# Patient Record
Sex: FEMALE | Race: WHITE | NOT HISPANIC OR LATINO | Employment: FULL TIME | URBAN - METROPOLITAN AREA
[De-identification: names, ages, dates, MRNs, and addresses within clinical notes are randomized per-mention and may not be internally consistent; named-entity substitution may affect disease eponyms.]

---

## 2017-01-18 ENCOUNTER — TELEPHONE (OUTPATIENT)
Dept: OBSTETRICS AND GYNECOLOGY | Facility: CLINIC | Age: 27
End: 2017-01-18

## 2017-01-18 NOTE — TELEPHONE ENCOUNTER
Spoke w/ pt. & is currently 27wks pregnant. I rescheduled her appt. With us to 1-25 which is sooner.

## 2017-01-18 NOTE — TELEPHONE ENCOUNTER
LMOR for pt. To return call.  Cannot give pt. A note for work since pt. Has not been seen by  yet.   Also wanted to ask pt. If she would rather move her appt. Earlier to the 25th in [place] rather than the 31st?  Also need to clarify how many wks pt. Is currently.

## 2017-01-18 NOTE — TELEPHONE ENCOUNTER
Dr. Shields new ob pt, 7 months ob, had appt schedule for yesterday but had to reschedule for the 31st per Dr. Shields. 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 Please call pt at [phone]

## 2017-01-25 ENCOUNTER — OFFICE VISIT (OUTPATIENT)
Dept: OBSTETRICS AND GYNECOLOGY | Facility: CLINIC | Age: 27
End: 2017-01-25
Attending: OBSTETRICS & GYNECOLOGY
Payer: COMMERCIAL

## 2017-01-25 VITALS
WEIGHT: 207.13 LBS | DIASTOLIC BLOOD PRESSURE: 62 MMHG | BODY MASS INDEX: 32.51 KG/M2 | SYSTOLIC BLOOD PRESSURE: 108 MMHG | HEIGHT: 67 IN

## 2017-01-25 DIAGNOSIS — Z34.02 ENCOUNTER FOR SUPERVISION OF NORMAL FIRST PREGNANCY IN SECOND TRIMESTER: ICD-10-CM

## 2017-01-25 DIAGNOSIS — Z3A.27 27 WEEKS GESTATION OF PREGNANCY: Primary | ICD-10-CM

## 2017-01-25 DIAGNOSIS — A60.00 HERPES SIMPLEX INFECTION OF GENITOURINARY SYSTEM: ICD-10-CM

## 2017-01-25 PROCEDURE — 0500F INITIAL PRENATAL CARE VISIT: CPT | Mod: S$GLB,,, | Performed by: OBSTETRICS & GYNECOLOGY

## 2017-01-25 PROCEDURE — 99999 PR PBB SHADOW E&M-EST. PATIENT-LVL III: CPT | Mod: PBBFAC,,, | Performed by: OBSTETRICS & GYNECOLOGY

## 2017-01-25 NOTE — MR AVS SNAPSHOT
[facility]  [address]  [place] [address]  Phone: [phone]  Fax: [phone]                  Leydi Matias   2017 9:15 AM   Office Visit    Description:  Female : 1990   Provider:  Robert Shields MD   Department:  [facility]           Reason for Visit     Routine Prenatal Visit           Diagnoses this Visit        Comments    27 weeks gestation of pregnancy    -  Primary     Encounter for supervision of normal first pregnancy in second trimester                To Do List           Future Appointments        Provider Department Dept Phone    2017 1:00 PM Robert Shields MD [facility] [phone]    2017 1:30 PM Robert Shields MD [facility] [phone]      Goals (5 Years of Data)     None      [facility]     [facility] -  Assistance  Registered nurses in the [facility] On Call Center provide clinical advisement, health education, appointment booking, and other advisory services.  Call for this free service at [phone].             Medications           Message regarding Medications     Verify the changes and/or additions to your medication regime listed below are the same as discussed with your clinician today.  If any of these changes or additions are incorrect, please notify your healthcare provider.        STOP taking these medications     azithromycin (ZITHROMAX) 1 gram Pack Take 1 g by mouth once.    ethynodiol-ethinyl estradiol (KELNOR) 1-35 mg-mcg per tablet TAKE 4 PILLS FIRST DAY, 3 PILLS THE SECOND DAY, 2 PILLS THIRD DAY, THEN PILL QD AND SKIP SUGAR PILLS    tinidazole (TINDAMAX) 500 MG tablet 4 PILLS A DAY FOR 2 DAYS           Verify that the below list of medications is an accurate representation of the medications you are currently taking.  If none reported, the list may be blank. If incorrect, please contact your healthcare provider. Carry this list with you in  "case of emergency.           Current Medications     PRENATAL VIT/IRON FUMARATE/FA (PRENATAL 1+1 ORAL) Take by mouth.    drospirenone-ethinyl estradiol (SHERRIE) 3-0.02 mg per tablet Take 1 tablet by mouth once daily.           Clinical Reference Information           Prenatal Vitals     Enc. Date GA Prenatal Vitals Prenatal Pulse Pain Level Urine Albumin/Glucose Edema Presentation Dilation/Effacement/Station    1/25/17  108/62 / 93.9 kg (207 lb 2 oz)              Height: 5' 7" (1.702 m)       Vital Signs - Last Recorded  Most recent update: 1/25/2017  9:24 AM by Lis Calhoun MA    BP Ht Wt LMP BMI    108/62 5' 7" (1.702 m) 93.9 kg (207 lb 2 oz) 07/04/2016 32.44 kg/m2      Blood Pressure          Most Recent Value    BP  108/62      Allergies as of 1/25/2017     No Known Allergies      Immunizations Administered on Date of Encounter - 1/25/2017     None      Orders Placed During Today's Visit     Future Labs/Procedures Expected by Expires    CBC auto differential  1/25/2017 3/26/2018    OB Glucose Screen  1/25/2017 3/26/2018      [facility] Sign-Up     Activating your [facility] account is as easy as 1-2-3!     1) Visit [email or web address], select Sign Up Now, enter this activation code and your date of birth, then select Next.  [email or web address]  Expires: 3/11/2017  8:58 AM      2) Create a username and password to use when you visit [facility] in the future and select a security question in case you lose your password and select Next.    3) Enter your e-mail address and click Sign Up!    Additional Information  If you have questions, please e-mail [email or web address] or call [phone] to talk to our [facility] staff. Remember, [facility] is NOT to be used for urgent needs. For medical emergencies, dial 911.         "

## 2017-01-29 PROBLEM — A60.00 HERPES SIMPLEX INFECTION OF GENITOURINARY SYSTEM: Status: ACTIVE | Noted: 2017-01-29

## 2017-01-30 NOTE — PROGRESS NOTES
Subjective:       Patient ID: Leydi Matias is a 27 y.o. female.    Chief Complaint:  Routine Prenatal Visit (pap:2016)      History of Present Illness  27-year-old she 0 presents with a last menstrual period of 16.  Recently moved from [place] and currently is 28 and one sevenths weeks pregnant.  EDC is 2017  Patient with an uncomplicated pregnancy.  Has had 2 ultrasounds thus far this pregnancy including one normal anatomy scan.  Patient reports the gender is a girl.  This pregnancy's only complication has been a diagnosis of HSV.  She is currently not on suppressive therapy and declines suppressive therapy until 35 weeks.      GYN & OB History  Patient's last menstrual period was 2016.   Date of Last Pap: 2016 normal  Had flu shot 2016    OB History    Para Term  AB SAB TAB Ectopic Multiple Living   1 0 0 0 0 0 0 0 0 0      # Outcome Date GA Lbr Rocco/2nd Weight Sex Delivery Anes PTL Lv   1 Current                   Review of Systems  Review of Systems   Constitutional: Negative for fatigue and unexpected weight change.   Respiratory: Negative for shortness of breath.    Cardiovascular: Negative for chest pain.   Gastrointestinal: Negative for abdominal pain, constipation, diarrhea, nausea and vomiting.   Genitourinary: Negative for dysuria.   Musculoskeletal: Negative for back pain.   Skin: Negative for rash.   Neurological: Negative for headaches.   Hematological: Does not bruise/bleed easily.   Psychiatric/Behavioral: Negative for behavioral problems.        Objective:   Physical Exam:   Constitutional: She is oriented to person, place, and time. She appears well-developed and well-nourished.    HENT:   Head: Normocephalic and atraumatic.            Abdominal: Soft. There is no tenderness.   Fundal height 28  Fetal heart tones present             Musculoskeletal: Normal range of motion.       Neurological: She is alert and oriented to person, place, and time.           Assessment/ Plan:     Orders Placed This Encounter    OB Glucose Screen    CBC auto differential       Leydi was seen today for routine prenatal visit.    Diagnoses and all orders for this visit:    27 weeks gestation of pregnancy  -     OB Glucose Screen; Future  -     CBC auto differential; Future    Encounter for supervision of normal first pregnancy in second trimester  -     OB Glucose Screen; Future  -     CBC auto differential; Future        Return in about 2 weeks (around 2/8/2017).      Health Maintenance       Date Due Completion Date    Lipid Panel 1990 ---    TETANUS VACCINE 1/26/2006 ---    Pap Smear 1/26/2009 ---    Influenza Vaccine 8/1/2016 ---

## 2017-02-02 ENCOUNTER — LAB VISIT (OUTPATIENT)
Dept: LAB | Facility: HOSPITAL | Age: 27
End: 2017-02-02
Attending: OBSTETRICS & GYNECOLOGY
Payer: COMMERCIAL

## 2017-02-02 DIAGNOSIS — Z3A.27 27 WEEKS GESTATION OF PREGNANCY: ICD-10-CM

## 2017-02-02 DIAGNOSIS — Z34.02 ENCOUNTER FOR SUPERVISION OF NORMAL FIRST PREGNANCY IN SECOND TRIMESTER: ICD-10-CM

## 2017-02-02 LAB
BASOPHILS # BLD AUTO: 0.01 K/UL
BASOPHILS NFR BLD: 0.2 %
DIFFERENTIAL METHOD: ABNORMAL
EOSINOPHIL # BLD AUTO: 0.2 K/UL
EOSINOPHIL NFR BLD: 3.1 %
ERYTHROCYTE [DISTWIDTH] IN BLOOD BY AUTOMATED COUNT: 15 %
GLUCOSE SERPL-MCNC: 106 MG/DL
HCT VFR BLD AUTO: 28.4 %
HGB BLD-MCNC: 9.3 G/DL
LYMPHOCYTES # BLD AUTO: 1.3 K/UL
LYMPHOCYTES NFR BLD: 20.4 %
MCH RBC QN AUTO: 26.1 PG
MCHC RBC AUTO-ENTMCNC: 32.7 %
MCV RBC AUTO: 80 FL
MONOCYTES # BLD AUTO: 0.3 K/UL
MONOCYTES NFR BLD: 4.5 %
NEUTROPHILS # BLD AUTO: 4.6 K/UL
NEUTROPHILS NFR BLD: 71.2 %
PLATELET # BLD AUTO: 153 K/UL
PMV BLD AUTO: 10.5 FL
RBC # BLD AUTO: 3.57 M/UL
WBC # BLD AUTO: 6.51 K/UL

## 2017-02-02 PROCEDURE — 82950 GLUCOSE TEST: CPT

## 2017-02-02 PROCEDURE — 85025 COMPLETE CBC W/AUTO DIFF WBC: CPT

## 2017-02-03 NOTE — PROGRESS NOTES
Please call and tell result below...    Hi! Your glucose tolerance was normal which means you do NOT have gestational diabetes.   However you are slightly anemic due to pregnancy.  This is very common at the end of pregnancy.   I recommend that you start an over-the-counter iron once a day in addition to prenatal vitamin.  Hopefully with the extra iron, you'll have more energy and will be less tired.  Take care,  Dr. Shields

## 2017-02-06 ENCOUNTER — TELEPHONE (OUTPATIENT)
Dept: OBSTETRICS AND GYNECOLOGY | Facility: CLINIC | Age: 27
End: 2017-02-06

## 2017-02-06 NOTE — TELEPHONE ENCOUNTER
Left message stating that per Dr. Shields her glucose test came back normal but that her labs did show she was slightly anemic. Advised to start an over the counter iron supplement in addition to her prenatal vitamin.     ----- Message from oRbert Shields MD sent at 2/3/2017  4:52 PM CST -----  Please call and tell result below...    Hi! Your glucose tolerance was normal which means you do NOT have gestational diabetes.   However you are slightly anemic due to pregnancy.  This is very common at the end of pregnancy.   I recommend that you start an over-the-counter iron once a day in addition to prenatal vitamin.  Hopefully with the extra iron, you'll have more energy and will be less tired.  Take care,  Dr. Shields

## 2017-02-09 ENCOUNTER — ROUTINE PRENATAL (OUTPATIENT)
Dept: OBSTETRICS AND GYNECOLOGY | Facility: CLINIC | Age: 27
End: 2017-02-09
Payer: COMMERCIAL

## 2017-02-09 VITALS
BODY MASS INDEX: 33.08 KG/M2 | DIASTOLIC BLOOD PRESSURE: 60 MMHG | SYSTOLIC BLOOD PRESSURE: 112 MMHG | WEIGHT: 211.19 LBS

## 2017-02-09 DIAGNOSIS — Z3A.30 30 WEEKS GESTATION OF PREGNANCY: Primary | ICD-10-CM

## 2017-02-09 PROCEDURE — 0502F SUBSEQUENT PRENATAL CARE: CPT | Mod: S$GLB,,, | Performed by: OBSTETRICS & GYNECOLOGY

## 2017-02-09 PROCEDURE — 99999 PR PBB SHADOW E&M-EST. PATIENT-LVL II: CPT | Mod: PBBFAC,,, | Performed by: OBSTETRICS & GYNECOLOGY

## 2017-02-09 NOTE — PROGRESS NOTES
Patient without complaints.  Glucose tolerance test done and was normal.  Patient overall feeling well.  Follow-up in 2 weeks with nurse practitioner and then in 4 weeks with bone

## 2017-02-09 NOTE — MR AVS SNAPSHOT
[facility]  [address]  [place] [address]  Phone: [phone]  Fax: [phone]                  Leydi Matias   2017 1:00 PM   Routine Prenatal    Description:  Female : 1990   Provider:  Robert Shields MD   Department:  [facility]           Reason for Visit     Routine Prenatal Visit           Diagnoses this Visit        Comments    30 weeks gestation of pregnancy    -  Primary            To Do List           Future Appointments        Provider Department Dept Phone    2017 1:30 PM Robert Shields MD [facility] [phone]    3/9/2017 1:30 PM Robert Shields MD [facility] [phone]    3/23/2017 1:30 PM Robert Shields MD [facility] [phone]    3/30/2017 1:30 PM Robert Shields MD [facility] [phone]    2017 1:30 PM Robert Shields MD [facility] [phone]      Goals (5 Years of Data)     None      Follow-Up and Disposition     Return in about 2 weeks (around 2017).    Follow-up and Disposition History      [facility]     [facility] On Call Nurse Care Line -  Assistance  Registered nurses in the [facility] On Call Center provide clinical advisement, health education, appointment booking, and other advisory services.  Call for this free service at [phone].             Medications           Message regarding Medications     Verify the changes and/or additions to your medication regime listed below are the same as discussed with your clinician today.  If any of these changes or additions are incorrect, please notify your healthcare provider.             Verify that the below list of medications is an accurate representation of the medications you are currently taking.  If none reported, the list may be blank. If incorrect, please contact your healthcare provider. Carry this list with you in case of emergency.           Current Medications      "drospirenone-ethinyl estradiol (SHERRIE) 3-0.02 mg per tablet Take 1 tablet by mouth once daily.    PRENATAL VIT/IRON FUMARATE/FA (PRENATAL 1+1 ORAL) Take by mouth.           Clinical Reference Information           Prenatal Vitals     Enc. Date GA Prenatal Vitals Prenatal Pulse Pain Level Urine Albumin/Glucose Edema Presentation Dilation/Effacement/Station    2/9/17 30w2d 112/60 / 95.8 kg (211 lb 3.2 oz) 31 cm / An 33 / Present  0 Negative / Negative None / None / None / No      1/25/17 28w1d 108/62 / 93.9 kg (207 lb 2 oz)              Height: 5' 7" (1.702 m)       Your Vitals Were     BP Weight Last Period BMI       112/60 95.8 kg (211 lb 3.2 oz) 07/12/2016 (Exact Date) 33.08 kg/m2       Allergies as of 2/9/2017     No Known Allergies      Immunizations Administered on Date of Encounter - 2/9/2017     None      [facility] Sign-Up     Activating your [facility] account is as easy as 1-2-3!     1) Visit [email or web address], select Sign Up Now, enter this activation code and your date of birth, then select Next.  [email or web address]  Expires: 3/11/2017  8:58 AM      2) Create a username and password to use when you visit [facility] in the future and select a security question in case you lose your password and select Next.    3) Enter your e-mail address and click Sign Up!    Additional Information  If you have questions, please e-mail [email or web address] or call [phone] to talk to our [facility] staff. Remember, [facility] is NOT to be used for urgent needs. For medical emergencies, dial 911.         Language Assistance Services     ATTENTION: Language assistance services are available, free of charge. Please call [phone].      ATENCIÓN: Si habla español, tiene a paige disposición servicios gratuitos de asistencia lingüística. Llame al [phone].     CHÚ Ý: N?u b?n nói Ti?ng Vi?t, có các d?ch v? h? tr? ngôn ng? mi?n phí dành cho b?n. G?i s? [phone].         [facility] complies with applicable " Federal civil rights laws and does not discriminate on the basis of race, color, national origin, age, disability, or sex.

## 2017-02-23 ENCOUNTER — ROUTINE PRENATAL (OUTPATIENT)
Dept: OBSTETRICS AND GYNECOLOGY | Facility: CLINIC | Age: 27
End: 2017-02-23
Payer: COMMERCIAL

## 2017-02-23 VITALS
SYSTOLIC BLOOD PRESSURE: 126 MMHG | BODY MASS INDEX: 33.15 KG/M2 | DIASTOLIC BLOOD PRESSURE: 76 MMHG | WEIGHT: 211.63 LBS

## 2017-02-23 DIAGNOSIS — Z3A.32 32 WEEKS GESTATION OF PREGNANCY: Primary | ICD-10-CM

## 2017-02-23 PROCEDURE — 99999 PR PBB SHADOW E&M-EST. PATIENT-LVL I: CPT | Mod: PBBFAC,,, | Performed by: OBSTETRICS & GYNECOLOGY

## 2017-02-23 PROCEDURE — 0502F SUBSEQUENT PRENATAL CARE: CPT | Mod: S$GLB,,, | Performed by: OBSTETRICS & GYNECOLOGY

## 2017-02-23 NOTE — MR AVS SNAPSHOT
[facility]  [address]  [place] [address]  Phone: [phone]  Fax: [phone]                  Leydi Matias   2017 1:30 PM   Routine Prenatal    Description:  Female : 1990   Provider:  Robert Shields MD   Department:  [facility]           Reason for Visit     Routine Prenatal Visit           Diagnoses this Visit        Comments    32 weeks gestation of pregnancy    -  Primary            To Do List           Future Appointments        Provider Department Dept Phone    3/9/2017 1:30 PM Robert Shields MD [facility] [phone]    3/9/2017 2:45 PM Lang Moser, OD [place] [facility] [phone]    3/9/2017 3:15 PM Lang Moser, OD [place] [facility] [phone]    3/23/2017 1:30 PM Robert Shields MD [facility] [phone]    3/30/2017 1:30 PM Robert Shields MD [facility] [phone]      Goals (5 Years of Data)     None      Follow-Up and Disposition     Return in about 2 weeks (around 3/9/2017).    Follow-up and Disposition History      [facility]     [facility] On Call Nurse Care Line -  Assistance  Registered nurses in the [facility] On Call Center provide clinical advisement, health education, appointment booking, and other advisory services.  Call for this free service at [phone].             Medications           Message regarding Medications     Verify the changes and/or additions to your medication regime listed below are the same as discussed with your clinician today.  If any of these changes or additions are incorrect, please notify your healthcare provider.             Verify that the below list of medications is an accurate representation of the medications you are currently taking.  If none reported, the list may be blank. If incorrect, please contact your healthcare provider. Carry this list with you in case of emergency.           Current Medications      "drospirenone-ethinyl estradiol (SHERRIE) 3-0.02 mg per tablet Take 1 tablet by mouth once daily.    PRENATAL VIT/IRON FUMARATE/FA (PRENATAL 1+1 ORAL) Take by mouth.           Clinical Reference Information           Prenatal Vitals     Enc. Date GA Prenatal Vitals Prenatal Pulse Pain Level Urine Albumin/Glucose Edema Presentation Dilation/Effacement/Station    2/23/17 32w2d 126/76 / 96 kg (211 lb 10.3 oz) 32 cm / 144 / Present  0 Negative / Negative +1 / +1 / None / No      2/9/17 30w2d 112/60 / 95.8 kg (211 lb 3.2 oz) 31 cm / An 33 / Present  0 Negative / Negative None / None / None / No      1/25/17 28w1d 108/62 / 93.9 kg (207 lb 2 oz)              Number of fetuses: 1   Height: 5' 7" (1.702 m)       Your Vitals Were     BP Weight Last Period BMI       126/76 96 kg (211 lb 10.3 oz) 07/12/2016 (Exact Date) 33.15 kg/m2       Allergies as of 2/23/2017     No Known Allergies      Immunizations Administered on Date of Encounter - 2/23/2017     None      Language Assistance Services     ATTENTION: Language assistance services are available, free of charge. Please call [phone].      ATENCIÓN: Si russell smith, tiene a paige disposición servicios gratuitos de asistencia lingüística. Llame al [phone].     [facility] Ý: N?u b?n nói Ti?ng Vi?t, có các d?ch v? h? tr? ngôn ng? mi?n phí dành cho b?n. G?i s? [phone].         [facility] complies with applicable Federal civil rights laws and does not discriminate on the basis of race, color, national origin, age, disability, or sex.        "

## 2017-02-23 NOTE — PROGRESS NOTES
Patient without complaints.  Doing well.  Active fetal movement.  Has a right hip pain when she lays on her side at night.  Otherwise no contractions and doing well.  Follow-up in 2 weeks.  Discussed suppressive Valtrex after next visit.

## 2017-02-27 ENCOUNTER — NURSE TRIAGE (OUTPATIENT)
Dept: ADMINISTRATIVE | Facility: CLINIC | Age: 27
End: 2017-02-27

## 2017-02-28 NOTE — TELEPHONE ENCOUNTER
Reason for Disposition   Round ligament pain (previously diagnosed by physician), questions about    Protocols used: ST PREGNANCY - ABDOMINAL PAIN GREATER THAN 20 WEEKS EGA-A-    Pt calling with complaints of pain rates 3/10 on pain scale (pain earlier 7/10 but better now).  Care Advice given.

## 2017-03-06 ENCOUNTER — TELEPHONE (OUTPATIENT)
Dept: OBSTETRICS AND GYNECOLOGY | Facility: CLINIC | Age: 27
End: 2017-03-06

## 2017-03-06 NOTE — TELEPHONE ENCOUNTER
33 6/7 week ob  States she hasn't been feeling well lately.  She has an appt Thursday but wants to be seen sooner.  She has been working a lot lately and the AC in her house isnt working so it has been hot.  C/o Had a tightness and pain in her lower abdomen during Mardi Gras so she called after hours and was told it was probably round ligament.  She started getting lightheaded and nauseated yesterday so she is concerned and would like to be seen sooner. Recommended she increase PO hydration and rescheduled her appt to tomorrow with Yohana per pt's request.  Offered her an appt with Radha tomorrow at [facility] [place] since Dr. Shields will be there too, declined.

## 2017-03-06 NOTE — TELEPHONE ENCOUNTER
Dr Shields pt calling, Ob 34wks not feeling good. During Ray[place] Gras her belly was tight and cramping. Pt also states that she has to lift and push heavy things at work and she just wants to make sure that everything is ok.Pt # [phone]

## 2017-03-07 ENCOUNTER — ROUTINE PRENATAL (OUTPATIENT)
Dept: OBSTETRICS AND GYNECOLOGY | Facility: CLINIC | Age: 27
End: 2017-03-07
Payer: COMMERCIAL

## 2017-03-07 ENCOUNTER — TELEPHONE (OUTPATIENT)
Dept: OBSTETRICS AND GYNECOLOGY | Facility: CLINIC | Age: 27
End: 2017-03-07

## 2017-03-07 ENCOUNTER — CLINICAL SUPPORT (OUTPATIENT)
Dept: OBSTETRICS AND GYNECOLOGY | Facility: CLINIC | Age: 27
End: 2017-03-07
Payer: COMMERCIAL

## 2017-03-07 VITALS
SYSTOLIC BLOOD PRESSURE: 106 MMHG | WEIGHT: 214.81 LBS | BODY MASS INDEX: 33.65 KG/M2 | DIASTOLIC BLOOD PRESSURE: 78 MMHG

## 2017-03-07 DIAGNOSIS — Z34.00 GRAVIDA 1, CURRENTLY PREGNANT: ICD-10-CM

## 2017-03-07 DIAGNOSIS — Z23 NEED FOR TDAP VACCINATION: Primary | ICD-10-CM

## 2017-03-07 DIAGNOSIS — Z3A.34 34 WEEKS GESTATION OF PREGNANCY: Primary | ICD-10-CM

## 2017-03-07 PROCEDURE — 99999 PR PBB SHADOW E&M-EST. PATIENT-LVL II: CPT | Mod: PBBFAC,,, | Performed by: NURSE PRACTITIONER

## 2017-03-07 PROCEDURE — 90715 TDAP VACCINE 7 YRS/> IM: CPT | Mod: S$GLB,,, | Performed by: NURSE PRACTITIONER

## 2017-03-07 PROCEDURE — 0502F SUBSEQUENT PRENATAL CARE: CPT | Mod: S$GLB,,, | Performed by: NURSE PRACTITIONER

## 2017-03-07 PROCEDURE — 90471 IMMUNIZATION ADMIN: CPT | Mod: S$GLB,,, | Performed by: NURSE PRACTITIONER

## 2017-03-07 NOTE — TELEPHONE ENCOUNTER
"Pt mother, Natalie Watkins, phoned back as promised. Natalie informed that clarification was explained about our phone system. Our office doesn't have a "[employer]" phone number and our old private practice phone number was retained, therefore sometimes our office number is trickier to find. When calling our office number [phone] during business hours there will be someone to answer the phones, after hours and holidays the OB triage line is reached where you are routed to either a OB triage RN or a GYN triage RN based on your selection. Another way to contact your MD is your daughter could send a "my Portal" message. Feb 27th was not a holiday and the office was open so if patient or patient mother was calling the correct number then a voicemail box should not be an option. An apology was extended to patient's mother, Natalie accepted apology. Dr Shields's MA, Heidi Nagel, informed of situation.  "

## 2017-03-07 NOTE — TELEPHONE ENCOUNTER
"Pt's mother called, her name is Natalie Watkins she said she was very nervous about not being able to get in touch with someone on Monday Feb 27th (the day before mardi gras). She stated that her daughter is due around [facility] and she doesn't want this to happen again "because it is a holiday". She reported that she called the office and was prompted and transferred but was unable to leave a voicemail because the "box was full" she said she then tried to call the [facility] line and could not get in touch with anyone. Finally she tried the [facility] on-call line and spoke to a triage RN, who was able to provide her daughter with advice for her concern. Natalie expressed that she is a RN at [facility] and that she was extremely frustrated that she could not get in touch with someone. Pt mother phone number taken and informed that after speaking with Phone Coordinator a return call would be made to her. Pt Mother provided phone number [phone]  "

## 2017-03-07 NOTE — TELEPHONE ENCOUNTER
Pt's mom Natalie called to speak with . She was very upset because her daughter was in pain on 2/27,the day before aleisha cooper and said she called 3 different numbers and could not get in touch with anyone. She said finally she got in touch with the on call staff but did not hear from our office to check on pt or see how she was. Pt called 3/6 because she did not feel well and spoke with Ale, was offered appts and came in to be seen today,3/7. Transferred call to [place].

## 2017-03-07 NOTE — PATIENT INSTRUCTIONS
Topic  General Pregnancy Information Recommended   (Unless Otherwise Contraindicated Or Restrictions Given To You By Your OB Doctor)      1. Anticipated course of prenatal care       Visits: will be Every 4 wks until 28 weeks, then every 2 weeks until 36 weeks, and then weekly until delivery.    Urine will be collected at each Obstetric visit        2. Nutrition and weight gain     Daily pre-hazel vitamin (recommend taking at night)    Additional 300 calories needed daily   No Sushi, hotdogs, unpasteurized products (milk/cheeses). No large fish such as: shark, zaid mackerel, tile, sword fish    Incorporate 12 ounces of smaller seafood/week and no more than 6oz of albacore tuna    Caffeine: 200 mg/day or 2 cups of caffeine/day    Weight gain recommendations are based off of BMI before pregnancy. Generally patients who with normal weight prior pregnancy it is recommended 25-35 pounds of weight gain during the pregnancy with an estimated weekly gain of 1 pound/wk in 2nd and 3rd Trimester.    3. Toxoplasmosis precautions   If cats are in the home avoid changing litter boxes and if you need to change the litter box recommended you use gloves   4. Sexual Activity   Sexual activity is okay unless you are put on restrictions by your provider. I recommend urinating after intercourse    5. Exercise   Generally pre-pregnancy routine is okay to continue    Drink plenty fluids for hydration    Stop any activity that causes heavy cramping like a period or bright red bleeding and contact your provider   No extreme or contact sports    No exercise on your back for an extended period of time after 20 weeks    6. Hot Tub/Saunas   Avoid hot tubes and saunas    7. Hair Treatments   Because of the lack of scientific studies on the effects of chemical treatments on your hair, we must advise that you do it at your own risk. If you choose to treat your hair, we recommend that you wait until after 12 weeks gestation. At  this time there is no reason to believe that normal hair treatment is associated with onsequences to the baby.    8. Vaccines   Influenza vaccine is recommended by CDC during flu season    Tdap (pertussis or whopping cough) recommended each pregnancy between 27 and 36 weeks    Tdap booster recommended for family and other planned direct caregivers    9. Water   Water is an important nutrient in a good diet. However, it cannot be stressed enough that during pregnancy water is essential. The body has increased circulation through blood vessels, and without a large increase in water, pregnant women will be dehydrated. It plays an important role in decreasing constipation, preventing  contractions, decreasing swelling, and preventing dizziness. We recommend that you drink 8-10 glasses of water per day.    10. Smoking/Alcohol/Illicit Drug Use   No safe Level    Can lead to problems with pregnancy    Growth of the developing fetus     labor (delivery before 37 weeks)     rupture of the membranes (water breaking before 37 weeks)    Premature separation of the placenta (which may cause bleeding)    American College of Obstetricians and Gynecologists endorses abstinence    Can lead to babies with disabilities    11. Environmental or work hazards   Unless otherwise restricted you may continue work throughout the pregnancy    Notify your provider of any work hazards or chemical exposure concerns   12. Travel     Safe to travel up to 35 weeks    Continue to wear a seatbelt and airbags are still recommended    Drink plenty fluids    Blood clots are a concern during pregnancy with long travel. Recommend compression stockings and moving around at least every 2 hours and staying hydrated.    13. Use of medications, vitamins, herbs, OTC drugs     Any medications not on the list provided to you from our clinic or given to you by one of our providers we recommend calling to make sure the  medication is safe for you and baby.    14. Domestic Violence     Please notify office immediately of any concerns or violence so that we can help direct you to assistance needed    [place] [employer]: [phone]    15. Childbirth classes     List of Childbirth classes from [facility] is available    16. Selecting a Pediatrician   Selecting a pediatrician before delivery is recommended   You can interview pediatricians before delivery    17. Fetal Monitoring     A simple test of your babys well-being is a kick count. After 26 weeks, fetal motion of any kind should be monitored. Further discussion at that time   18.  Labor Signs     Water break, leaking fluids from Vagina prior 37 weeks   Regular contractions, Contractions that are more than 5-6/hour, getting stronger and painful with lower back pain, does not go away with rest and fluids    19. Postpartum Family Planning     Multiple options available from short term methods to long term reversible and irreversible methods    Discuss with provider as you get closer to delivery    20. Dental     It is recommended that you get an annual dental cleaning    21. Breastfeeding     Classes offered at [facility] and it is recommended to take a class    22. Lifting  In 2013, the National [place] for Occupational Safety and Health (NIOSH) published clinical guidelines for occupational lifting in uncomplicated pregnancies. The recommended weight limits are based on gestational age, intermittent versus repetitive lifting, time (hours/day) spent lifting, and lifting height from floor and distance in 3 front of body. In this guideline, the maximum permissible weight for a woman less than 20 weeks of gestation performing infrequent lifting is 36 pounds (16 kgs) and the maximum permissible weight at ?20 weeks is 26 pounds (12 kgs). For repetitive lifting ?1 hour/day, the maximum weights in the first and second half of pregnancy are  18 pounds (8 kgs) and 13 pounds (6 kgs), respectively, and for repetitive lifting <1 hour/day, the maximum weights are 30 pounds (14 kgs) and 22 pounds (10 kgs), respectively. Although not based on high quality evidence, these guidelines are a reasonable reference for counseling pregnant women     23. Scheduling and Provider Availability     Your Obstetric Doctor is usually here weekly but not every day. We recommend you make 3-4 advanced appointments at a time to accommodate your personal needs and work/school obligations.    We ask that you come 15 minutes prior your scheduled appointment.    For same day appointments (not routine appointments) there is a Nurse Practitioner or another obstetric provider available. Please let the  aware you are an OB patient requesting a same day appointment.      24. Recommended Phone Colt     Molecular Detection    [facility]

## 2017-03-07 NOTE — PROGRESS NOTES
Ordering Physician: Yohana George NP    Order Type: Verbal     During visit today patient received injection of tdap to left deltoid. Patient tolerated well, no allergic reaction noted. Requested patient to remain 10 minutes after injection.     Pre-Pain Scale:None     Post Pain Scale:None

## 2017-03-07 NOTE — MR AVS SNAPSHOT
[facility]  [address]  [place] [address]  Phone: [phone]  Fax: [phone]                  Leydi Matias   3/7/2017 1:40 PM   Routine Prenatal    Description:  Female : 1990   Provider:  Yohana George NP   Department:  [facility]           Reason for Visit     Routine Prenatal Visit           Diagnoses this Visit        Comments    34 weeks gestation of pregnancy    -  Primary      1, currently pregnant                To Do List           Future Appointments        Provider Department Dept Phone    3/9/2017 2:45 PM Lang Moser, OD [facility] [phone]    3/9/2017 3:15 PM Lang Moser, OD [facility] [phone]    3/23/2017 1:30 PM Robert Shields MD [facility] [phone]    3/30/2017 1:30 PM Robert Shields MD [facility] [phone]    2017 1:30 PM Robert Shields MD [facility] [phone]      Goals (5 Years of Data)     None      Follow-Up and Disposition     Return for Routine OB.    Follow-up and Disposition History      [facility]     [facility] On [facility] Nurse Care Line -  Assistance  Registered nurses in the [facility] provide clinical advisement, health education, appointment booking, and other advisory services.  Call for this free service at [phone].             Medications           Message regarding Medications     Verify the changes and/or additions to your medication regime listed below are the same as discussed with your clinician today.  If any of these changes or additions are incorrect, please notify your healthcare provider.             Verify that the below list of medications is an accurate representation of the medications you are currently taking.  If none reported, the list may be blank. If incorrect, please contact your healthcare provider. Carry this list with you in case of emergency.           Current Medications  "    PRENATAL VIT/IRON FUMARATE/FA (PRENATAL 1+1 ORAL) Take by mouth.    drospirenone-ethinyl estradiol (SHERRIE) 3-0.02 mg per tablet Take 1 tablet by mouth once daily.           Clinical Reference Information           Prenatal Vitals     Enc. Date GA Prenatal Vitals Prenatal Pulse Pain Level Urine Albumin/Glucose Edema Presentation Dilation/Effacement/Station    3/7/17 34w0d 106/78 / 97.4 kg (214 lb 13.4 oz)  /  / Present  4 Negative / Negative +1 / +1 / None / No      17 32w2d 126/76 / 96 kg (211 lb 10.3 oz) 32 cm / 144 / Present  0 Negative / Negative +1 / +1 / None / No      17 30w2d 112/60 / 95.8 kg (211 lb 3.2 oz) 31 cm / An 33 / Present  0 Negative / Negative None / None / None / No      17 28w1d 108/62 / 93.9 kg (207 lb 2 oz)              Number of fetuses: 1   Height: 5' 7" (1.702 m)       Your Vitals Were     BP Weight Last Period BMI       106/78 97.4 kg (214 lb 13.4 oz) 2016 (Exact Date) 33.65 kg/m2       Allergies as of 3/7/2017     No Known Allergies      Immunizations Administered on Date of Encounter - 3/7/2017     Name Date Dose VIS Date Route    TDAP 3/7/2017 0.5 mL 2015 Intramuscular      Instructions    Topic  General Pregnancy Information Recommended   (Unless Otherwise Contraindicated Or Restrictions Given To You By Your OB Doctor)      1. Anticipated course of prenatal care       Visits: will be Every 4 wks until 28 weeks, then every 2 weeks until 36 weeks, and then weekly until delivery.    Urine will be collected at each Obstetric visit        2. Nutrition and weight gain     Daily pre-hazel vitamin (recommend taking at night)    Additional 300 calories needed daily   No Sushi, hotdogs, unpasteurized products (milk/cheeses). No large fish such as: shark, zaid mackerel, tile, sword fish    Incorporate 12 ounces of smaller seafood/week and no more than 6oz of albacore tuna    Caffeine: 200 mg/day or 2 cups of caffeine/day    Weight gain recommendations are based " off of BMI before pregnancy. Generally patients who with normal weight prior pregnancy it is recommended 25-35 pounds of weight gain during the pregnancy with an estimated weekly gain of 1 pound/wk in 2nd and 3rd Trimester.    3. Toxoplasmosis precautions   If cats are in the home avoid changing litter boxes and if you need to change the litter box recommended you use gloves   4. Sexual Activity   Sexual activity is okay unless you are put on restrictions by your provider. I recommend urinating after intercourse    5. Exercise   Generally pre-pregnancy routine is okay to continue    Drink plenty fluids for hydration    Stop any activity that causes heavy cramping like a period or bright red bleeding and contact your provider   No extreme or contact sports    No exercise on your back for an extended period of time after 20 weeks    6. Hot Tub/Saunas   Avoid hot tubes and saunas    7. Hair Treatments   Because of the lack of scientific studies on the effects of chemical treatments on your hair, we must advise that you do it at your own risk. If you choose to treat your hair, we recommend that you wait until after 12 weeks gestation. At this time there is no reason to believe that normal hair treatment is associated with onsequences to the baby.    8. Vaccines   Influenza vaccine is recommended by CDC during flu season    Tdap (pertussis or whopping cough) recommended each pregnancy between 27 and 36 weeks    Tdap booster recommended for family and other planned direct caregivers    9. Water   Water is an important nutrient in a good diet. However, it cannot be stressed enough that during pregnancy water is essential. The body has increased circulation through blood vessels, and without a large increase in water, pregnant women will be dehydrated. It plays an important role in decreasing constipation, preventing  contractions, decreasing swelling, and preventing dizziness. We recommend that you  drink 8-10 glasses of water per day.    10. Smoking/Alcohol/Illicit Drug Use   No safe Level    Can lead to problems with pregnancy    Growth of the developing fetus     labor (delivery before 37 weeks)     rupture of the membranes (water breaking before 37 weeks)    Premature separation of the placenta (which may cause bleeding)    American College of Obstetricians and Gynecologists endorses abstinence    Can lead to babies with disabilities    11. Environmental or work hazards   Unless otherwise restricted you may continue work throughout the pregnancy    Notify your provider of any work hazards or chemical exposure concerns   12. Travel     Safe to travel up to 35 weeks    Continue to wear a seatbelt and airbags are still recommended    Drink plenty fluids    Blood clots are a concern during pregnancy with long travel. Recommend compression stockings and moving around at least every 2 hours and staying hydrated.    13. Use of medications, vitamins, herbs, OTC drugs     Any medications not on the list provided to you from our clinic or given to you by one of our providers we recommend calling to make sure the medication is safe for you and baby.    14. Domestic Violence     Please notify office immediately of any concerns or violence so that we can help direct you to assistance needed    [place] [employer]: [phone]    15. Childbirth classes     List of Childbirth classes from [facility] is available    16. Selecting a Pediatrician   Selecting a pediatrician before delivery is recommended   You can interview pediatricians before delivery    17. Fetal Monitoring     A simple test of your babys well-being is a kick count. After 26 weeks, fetal motion of any kind should be monitored. Further discussion at that time   18.  Labor Signs     Water break, leaking fluids from Vagina prior 37 weeks   Regular contractions, Contractions that are more  than 5-6/hour, getting stronger and painful with lower back pain, does not go away with rest and fluids    19. Postpartum Family Planning     Multiple options available from short term methods to long term reversible and irreversible methods    Discuss with provider as you get closer to delivery    20. Dental     It is recommended that you get an annual dental cleaning    21. Breastfeeding     Classes offered at [facility] and it is recommended to take a class    22. Lifting  In 2013, the National [place] for Occupational Safety and Health (NIOSH) published clinical guidelines for occupational lifting in uncomplicated pregnancies. The recommended weight limits are based on gestational age, intermittent versus repetitive lifting, time (hours/day) spent lifting, and lifting height from floor and distance in 3 front of body. In this guideline, the maximum permissible weight for a woman less than 20 weeks of gestation performing infrequent lifting is 36 pounds (16 kgs) and the maximum permissible weight at ?20 weeks is 26 pounds (12 kgs). For repetitive lifting ?1 hour/day, the maximum weights in the first and second half of pregnancy are 18 pounds (8 kgs) and 13 pounds (6 kgs), respectively, and for repetitive lifting <1 hour/day, the maximum weights are 30 pounds (14 kgs) and 22 pounds (10 kgs), respectively. Although not based on high quality evidence, these guidelines are a reasonable reference for counseling pregnant women     23. Scheduling and Provider Availability     Your Obstetric Doctor is usually here weekly but not every day. We recommend you make 3-4 advanced appointments at a time to accommodate your personal needs and work/school obligations.    We ask that you come 15 minutes prior your scheduled appointment.    For same day appointments (not routine appointments) there is a Nurse Practitioner or another obstetric provider available. Please let the  aware you are an OB patient requesting  a same day appointment.      24. Recommended Phone Colt     [facility]               Language Assistance Services     ATTENTION: Language assistance services are available, free of charge. Please call [phone].      ATENCIÓN: Si habla sarah, tiene a paige disposición servicios gratuitos de asistencia lingüística. Llame al [phone].     CHÚ Ý: N?u b?n nói Ti?ng Vi?t, có các d?ch v? h? tr? ngôn ng? mi?n phí dành cho b?n. G?i s? [phone].         [facility] complies with applicable Federal civil rights laws and does not discriminate on the basis of race, color, national origin, age, disability, or sex.

## 2017-03-09 ENCOUNTER — OFFICE VISIT (OUTPATIENT)
Dept: OPTOMETRY | Facility: CLINIC | Age: 27
End: 2017-03-09
Payer: COMMERCIAL

## 2017-03-09 DIAGNOSIS — H52.13 MYOPIA OF BOTH EYES: Primary | ICD-10-CM

## 2017-03-09 DIAGNOSIS — Z46.0 FITTING AND ADJUSTMENT OF SPECTACLES AND CONTACT LENSES: Primary | ICD-10-CM

## 2017-03-09 PROCEDURE — 92002 INTRM OPH EXAM NEW PATIENT: CPT | Mod: S$GLB,,, | Performed by: OPTOMETRIST

## 2017-03-09 PROCEDURE — 92310 CONTACT LENS FITTING OU: CPT | Mod: ,,, | Performed by: OPTOMETRIST

## 2017-03-09 PROCEDURE — 99999 PR PBB SHADOW E&M-EST. PATIENT-LVL II: CPT | Mod: PBBFAC,,, | Performed by: OPTOMETRIST

## 2017-03-09 PROCEDURE — 92015 DETERMINE REFRACTIVE STATE: CPT | Mod: S$GLB,,, | Performed by: OPTOMETRIST

## 2017-03-09 NOTE — PROGRESS NOTES
HPI     Contact Lens Fit    Additional comments: CL FIT           Comments   Patient would like an RX for glasses and CL's. Her current rx for CL's is   doing well.  She is 34 wks pregnant.    No gtts       Last edited by Justin Carvajla on 3/9/2017  2:51 PM. (History)        ROS     Negative for: Constitutional, Gastrointestinal, Neurological, Skin,   Genitourinary, Musculoskeletal, HENT, Endocrine, Cardiovascular, Eyes,   Respiratory, Psychiatric, Allergic/Imm, Heme/Lymph    Last edited by Lang Moser, OD on 3/9/2017  3:04 PM. (History)        Assessment /Plan     For exam results, see Encounter Report.    Myopia of both eyes      1. Good fit/[facility] w [facility] CLs--no Rx change.  Discussed importance of MONTHLY exchange.  2. Deferred dilation today due to pts pregnancy.  Discussed potential comfort issues w CLs, and need for REFRESH PLUS ATs    PLAN:    1. Wrote spex/CLRx  2. Continue Daily Wear schedule.  NO SLEEPING IN CONTACT LENSES. Clean and disinfect nightly.  exchange monthly.    3. rtc 1 yr

## 2017-03-23 ENCOUNTER — ROUTINE PRENATAL (OUTPATIENT)
Dept: OBSTETRICS AND GYNECOLOGY | Facility: CLINIC | Age: 27
End: 2017-03-23
Payer: COMMERCIAL

## 2017-03-23 VITALS
BODY MASS INDEX: 33.84 KG/M2 | SYSTOLIC BLOOD PRESSURE: 122 MMHG | WEIGHT: 216.06 LBS | DIASTOLIC BLOOD PRESSURE: 74 MMHG

## 2017-03-23 DIAGNOSIS — Z36.85 ANTENATAL SCREENING FOR STREPTOCOCCUS B: ICD-10-CM

## 2017-03-23 DIAGNOSIS — O26.849 FETAL SIZE INCONSISTENT WITH DATES: Primary | ICD-10-CM

## 2017-03-23 PROCEDURE — 99999 PR PBB SHADOW E&M-EST. PATIENT-LVL II: CPT | Mod: PBBFAC,,, | Performed by: OBSTETRICS & GYNECOLOGY

## 2017-03-23 PROCEDURE — 87081 CULTURE SCREEN ONLY: CPT

## 2017-03-23 PROCEDURE — 0502F SUBSEQUENT PRENATAL CARE: CPT | Mod: S$GLB,,, | Performed by: OBSTETRICS & GYNECOLOGY

## 2017-03-23 NOTE — PROGRESS NOTES
Patient Without complaints just feeling more pressure.  Cervix closed/30/-4 ballotable labor precautions were given.

## 2017-03-25 LAB — BACTERIA SPEC AEROBE CULT: NORMAL

## 2017-03-30 ENCOUNTER — PROCEDURE VISIT (OUTPATIENT)
Dept: OBSTETRICS AND GYNECOLOGY | Facility: CLINIC | Age: 27
End: 2017-03-30
Payer: COMMERCIAL

## 2017-03-30 ENCOUNTER — ROUTINE PRENATAL (OUTPATIENT)
Dept: OBSTETRICS AND GYNECOLOGY | Facility: CLINIC | Age: 27
End: 2017-03-30
Payer: COMMERCIAL

## 2017-03-30 ENCOUNTER — TELEPHONE (OUTPATIENT)
Dept: OBSTETRICS AND GYNECOLOGY | Facility: CLINIC | Age: 27
End: 2017-03-30

## 2017-03-30 VITALS
BODY MASS INDEX: 33.92 KG/M2 | DIASTOLIC BLOOD PRESSURE: 82 MMHG | WEIGHT: 216.63 LBS | SYSTOLIC BLOOD PRESSURE: 128 MMHG

## 2017-03-30 DIAGNOSIS — O26.849 FETAL SIZE INCONSISTENT WITH DATES: ICD-10-CM

## 2017-03-30 DIAGNOSIS — Z36.89 ENCOUNTER FOR ULTRASOUND TO CHECK FETAL GROWTH: ICD-10-CM

## 2017-03-30 DIAGNOSIS — A60.9 HSV (HERPES SIMPLEX VIRUS) ANOGENITAL INFECTION: Primary | ICD-10-CM

## 2017-03-30 PROCEDURE — 76816 OB US FOLLOW-UP PER FETUS: CPT | Mod: S$GLB,,, | Performed by: OBSTETRICS & GYNECOLOGY

## 2017-03-30 PROCEDURE — 99999 PR PBB SHADOW E&M-EST. PATIENT-LVL II: CPT | Mod: PBBFAC,,, | Performed by: OBSTETRICS & GYNECOLOGY

## 2017-03-30 PROCEDURE — 0502F SUBSEQUENT PRENATAL CARE: CPT | Mod: S$GLB,,, | Performed by: OBSTETRICS & GYNECOLOGY

## 2017-03-30 RX ORDER — VALACYCLOVIR HYDROCHLORIDE 1 G/1
500 TABLET, FILM COATED ORAL DAILY
Qty: 30 TABLET | Refills: 6 | Status: ON HOLD | OUTPATIENT
Start: 2017-03-30 | End: 2017-04-14 | Stop reason: HOSPADM

## 2017-03-30 NOTE — MR AVS SNAPSHOT
[facility]  [address]  [place] [address]  Phone: [phone]  Fax: [phone]                  Leydi Matias   3/30/2017 8:30 AM   Procedure visit    Description:  Female : 1990   Provider:  [facility]   Department:  [facility]           Diagnoses this Visit        Comments    Fetal size inconsistent with dates         Encounter for ultrasound to check fetal growth         Breech presentation of fetus                To Do List           Future Appointments        Provider Department Dept Phone    2017 1:30 PM Robert Shields MD [facility] [phone]    2017 1:30 PM Robert Shields MD [facility] [phone]      Goals (5 Years of Data)     None      [facility]     [facility] On Call Nurse Care Line -  Assistance  Unless otherwise directed by your provider, please contact [facility] On-Call, our nurse care line that is available for  assistance.     Registered nurses in the [facility] On Call Center provide: appointment scheduling, clinical advisement, health education, and other advisory services.  Call: [phone] (toll free)               Medications           Message regarding Medications     Verify the changes and/or additions to your medication regime listed below are the same as discussed with your clinician today.  If any of these changes or additions are incorrect, please notify your healthcare provider.             Verify that the below list of medications is an accurate representation of the medications you are currently taking.  If none reported, the list may be blank. If incorrect, please contact your healthcare provider. Carry this list with you in case of emergency.           Current Medications     PRENATAL VIT/IRON FUMARATE/FA (PRENATAL 1+1 ORAL) Take by mouth.           Clinical Reference Information           Prenatal Vitals     Enc. Date GA Prenatal Vitals Prenatal Pulse Pain  "Level Urine Albumin/Glucose Edema Presentation Dilation/Effacement/Station    3/30/17 37w2d 128/82 / 98.2 kg (216 lb 9.6 oz)  /  / Present  0 Negative / Negative +2 / +2 / None / No      3/23/17 36w2d 122/74 / 98 kg (216 lb 0.8 oz) 38 cm / 149 / Present   Negative / Negative +2 / +2 / None / No  0 / 30 / -4    3/7/17 34w0d 106/78 / 97.4 kg (214 lb 13.4 oz)  /  / Present  4 Negative / Negative +1 / +1 / None / No      2/23/17 32w2d 126/76 / 96 kg (211 lb 10.3 oz) 32 cm / 144 / Present  0 Negative / Negative +1 / +1 / None / No      2/9/17 30w2d 112/60 / 95.8 kg (211 lb 3.2 oz) 31 cm / An 33 / Present  0 Negative / Negative None / None / None / No      1/25/17 28w1d 108/62 / 93.9 kg (207 lb 2 oz)              Number of fetuses: 1   Height: 5' 7" (1.702 m)       Your Vitals Were     Last Period                   07/12/2016 (Exact Date)           Allergies as of 3/30/2017     No Known Allergies      Immunizations Administered on Date of Encounter - 3/30/2017     None      Orders Placed During Today's Visit      Normal Orders This Visit    US OB/GYN Procedure ([facility]) - Extended List       Language Assistance Services     ATTENTION: Language assistance services are available, free of charge. Please call [phone].      ATENCIÓN: Si habla sarah, tiene a paige disposición servicios gratuitos de asistencia lingüística. Llame al [phone].     [facility] Ý: N?u b?n nói Ti?ng Vi?t, có các d?ch v? h? tr? ngôn ng? mi?n phí dành cho b?n. G?i s? [phone].         [facility] complies with applicable Federal civil rights laws and does not discriminate on the basis of race, color, national origin, age, disability, or sex.        "

## 2017-03-30 NOTE — MR AVS SNAPSHOT
[facility]  [address]  [place] [address]  Phone: [phone]  Fax: [phone]                  Leydi Matias   3/30/2017 9:15 AM   Routine Prenatal    Description:  Female : 1990   Provider:  Robert Shields MD   Department:  [facility]           Reason for Visit     Pregnancy Ultrasound           Diagnoses this Visit        Comments    HSV (herpes simplex virus) anogenital infection    -  Primary            To Do List           Future Appointments        Provider Department Dept Phone    2017 1:30 PM Robert Shields MD [facility] [phone]    2017 1:30 PM Robert Shields MD [facility] [phone]      Goals (5 Years of Data)     None      Follow-Up and Disposition     Return in about 1 week (around 2017).    Follow-up and Disposition History       These Medications        Disp Refills Start End    valacyclovir (VALTREX) 1000 MG tablet 30 tablet 6 3/30/2017 3/31/2017    Take 0.5 tablets (500 mg total) by mouth once daily. - Oral    [id number] [facility] Ph #: [phone]         [facility]     [facility] -  Assistance  Unless otherwise directed by your provider, please contact [facility], our nurse care line that is available for  assistance.     Registered nurses in the [facility] provide: appointment scheduling, clinical advisement, health education, and other advisory services.  Call: [phone] (toll free)               Medications           Message regarding Medications     Verify the changes and/or additions to your medication regime listed below are the same as discussed with your clinician today.  If any of these changes or additions are incorrect, please notify your healthcare provider.        START taking these NEW medications        Refills     "valacyclovir (VALTREX) 1000 MG tablet 6    Sig: Take 0.5 tablets (500 mg total) by mouth once daily.    Class: Normal    Route: Oral      STOP taking these medications     drospirenone-ethinyl estradiol (SHERRIE) 3-0.02 mg per tablet Take 1 tablet by mouth once daily.           Verify that the below list of medications is an accurate representation of the medications you are currently taking.  If none reported, the list may be blank. If incorrect, please contact your healthcare provider. Carry this list with you in case of emergency.           Current Medications     PRENATAL VIT/IRON FUMARATE/FA (PRENATAL 1+1 ORAL) Take by mouth.    valacyclovir (VALTREX) 1000 MG tablet Take 0.5 tablets (500 mg total) by mouth once daily.           Clinical Reference Information           Prenatal Vitals     Enc. Date GA Prenatal Vitals Prenatal Pulse Pain Level Urine Albumin/Glucose Edema Presentation Dilation/Effacement/Station    3/30/17 37w2d 128/82 / 98.2 kg (216 lb 9.6 oz)  /  / Present  0 Negative / Negative +2 / +2 / None / No      3/23/17 36w2d 122/74 / 98 kg (216 lb 0.8 oz) 38 cm / 149 / Present   Negative / Negative +2 / +2 / None / No  0 / 30 / -4    3/7/17 34w0d 106/78 / 97.4 kg (214 lb 13.4 oz)  /  / Present  4 Negative / Negative +1 / +1 / None / No      2/23/17 32w2d 126/76 / 96 kg (211 lb 10.3 oz) 32 cm / 144 / Present  0 Negative / Negative +1 / +1 / None / No      2/9/17 30w2d 112/60 / 95.8 kg (211 lb 3.2 oz) 31 cm / An 33 / Present  0 Negative / Negative None / None / None / No      1/25/17 28w1d 108/62 / 93.9 kg (207 lb 2 oz)              Number of fetuses: 1   Height: 5' 7" (1.702 m)       Your Vitals Were     BP Weight Last Period BMI       128/82 98.2 kg (216 lb 9.6 oz) 07/12/2016 (Exact Date) 33.92 kg/m2       Allergies as of 3/30/2017     No Known Allergies      Immunizations Administered on Date of Encounter - 3/30/2017     None      Language Assistance Services     ATTENTION: Language assistance services are " available, free of charge. Please call [phone].      ATENCIÓN: Si habla shannonañol, tiene a paige disposición servicios gratuitos de asistencia lingüística. Llame al [phone].     CHÚ Ý: N?u b?n nói Ti?ng Vi?t, có các d?ch v? h? tr? ngôn ng? mi?n phí dành cho b?n. G?i s? [phone].         [facility] complies with applicable Federal civil rights laws and does not discriminate on the basis of race, color, national origin, age, disability, or sex.

## 2017-03-30 NOTE — PROCEDURES
Procedures   Obstetrical ultrasound completed today.  See report in imaging section of [facility].

## 2017-04-04 ENCOUNTER — TELEPHONE (OUTPATIENT)
Dept: OBSTETRICS AND GYNECOLOGY | Facility: CLINIC | Age: 27
End: 2017-04-04

## 2017-04-04 NOTE — TELEPHONE ENCOUNTER
Pt calling because her letter that we sent for her to return to work was missing information and she needs it to include that her maternity leave starts on 04/02,her due date is 04/18 and she will be on maternity leave until  06/13.

## 2017-04-06 ENCOUNTER — ROUTINE PRENATAL (OUTPATIENT)
Dept: OBSTETRICS AND GYNECOLOGY | Facility: CLINIC | Age: 27
End: 2017-04-06
Payer: COMMERCIAL

## 2017-04-06 VITALS — DIASTOLIC BLOOD PRESSURE: 78 MMHG | WEIGHT: 214.5 LBS | SYSTOLIC BLOOD PRESSURE: 110 MMHG | BODY MASS INDEX: 33.6 KG/M2

## 2017-04-06 DIAGNOSIS — Z3A.38 38 WEEKS GESTATION OF PREGNANCY: Primary | ICD-10-CM

## 2017-04-06 DIAGNOSIS — Z3A.39 39 WEEKS GESTATION OF PREGNANCY: ICD-10-CM

## 2017-04-06 PROCEDURE — 0502F SUBSEQUENT PRENATAL CARE: CPT | Mod: S$GLB,,, | Performed by: OBSTETRICS & GYNECOLOGY

## 2017-04-06 PROCEDURE — 99999 PR PBB SHADOW E&M-EST. PATIENT-LVL II: CPT | Mod: PBBFAC,,, | Performed by: OBSTETRICS & GYNECOLOGY

## 2017-04-06 NOTE — PROGRESS NOTES
No c/o Doing well scheduled for  next Tuesday.  Instructed to be nothing by mouth 8 hours prior to surgery.  Labor precautions given.

## 2017-04-06 NOTE — MR AVS SNAPSHOT
[facility]  [address]  [place] [address]  Phone: [phone]  Fax: [phone]                  Leydi Matias   2017 1:30 PM   Routine Prenatal    Description:  Female : 1990   Provider:  Robert Shields MD   Department:  [facility]           Reason for Visit     Routine Prenatal Visit           Diagnoses this Visit        Comments    38 weeks gestation of pregnancy    -  Primary            To Do List           Future Appointments        Provider Department Dept Phone    2017 9:15 AM Robert Shields MD [facility] [phone]    2017 10:15 AM Robert Shields MD [facility] [phone]      Goals (5 Years of Data)     None      Follow-Up and Disposition     Return in about 1 week (around 2017).    Follow-up and Disposition History      [facility]     [facility] Nurse Care Line -  Assistance  Unless otherwise directed by your provider, please contact [facility], our nurse care line that is available for  assistance.     Registered nurses in the [facility] provide: appointment scheduling, clinical advisement, health education, and other advisory services.  Call: [phone] (toll free)               Medications           Message regarding Medications     Verify the changes and/or additions to your medication regime listed below are the same as discussed with your clinician today.  If any of these changes or additions are incorrect, please notify your healthcare provider.             Verify that the below list of medications is an accurate representation of the medications you are currently taking.  If none reported, the list may be blank. If incorrect, please contact your healthcare provider. Carry this list with you in case of emergency.           Current Medications     PRENATAL VIT/IRON FUMARATE/FA (PRENATAL 1+1 ORAL) Take by mouth.    valacyclovir (VALTREX) 1000 MG tablet  "Take 0.5 tablets (500 mg total) by mouth once daily.           Clinical Reference Information           Prenatal Vitals     Enc. Date GA Prenatal Vitals Prenatal Pulse Pain Level Urine Albumin/Glucose Edema Presentation Dilation/Effacement/Station    4/6/17 38w2d 110/78 / 97.3 kg (214 lb 8.1 oz) 38 cm / 155 / Present  1 Negative / Negative +2 / +2 / None / No  0 / 30    3/30/17 37w2d 128/82 / 98.2 kg (216 lb 9.6 oz)  /  / Present  0 Negative / Negative +2 / +2 / None / No      3/23/17 36w2d 122/74 / 98 kg (216 lb 0.8 oz) 38 cm / 149 / Present   Negative / Negative +2 / +2 / None / No  0 / 30 / -4    3/7/17 34w0d 106/78 / 97.4 kg (214 lb 13.4 oz)  /  / Present  4 Negative / Negative +1 / +1 / None / No      2/23/17 32w2d 126/76 / 96 kg (211 lb 10.3 oz) 32 cm / 144 / Present  0 Negative / Negative +1 / +1 / None / No      2/9/17 30w2d 112/60 / 95.8 kg (211 lb 3.2 oz) 31 cm / An 33 / Present  0 Negative / Negative None / None / None / No      1/25/17 28w1d 108/62 / 93.9 kg (207 lb 2 oz)              Number of fetuses: 1   Height: 5' 7" (1.702 m)       Your Vitals Were     BP Weight Last Period BMI       110/78 97.3 kg (214 lb 8.1 oz) 07/12/2016 (Exact Date) 33.6 kg/m2       Allergies as of 4/6/2017     No Known Allergies      Immunizations Administered on Date of Encounter - 4/6/2017     None      Language Assistance Services     ATTENTION: Language assistance services are available, free of charge. Please call [phone].      ATENCIÓN: Si habla shannonañol, tiene a paige disposición servicios gratuitos de asistencia lingüística. Llame al [phone].     CHÚ Ý: N?u b?n nói Ti?ng Vi?t, có các d?ch v? h? tr? ngôn ng? mi?n phí dành cho b?n. G?i s? [phone].         [facility] complies with applicable Federal civil rights laws and does not discriminate on the basis of race, color, national origin, age, disability, or sex.        "

## 2017-04-11 ENCOUNTER — TELEPHONE (OUTPATIENT)
Dept: OBSTETRICS AND GYNECOLOGY | Facility: CLINIC | Age: 27
End: 2017-04-11

## 2017-04-11 ENCOUNTER — SURGERY (OUTPATIENT)
Age: 27
End: 2017-04-11

## 2017-04-11 ENCOUNTER — ANESTHESIA (OUTPATIENT)
Dept: OBSTETRICS AND GYNECOLOGY | Facility: OTHER | Age: 27
End: 2017-04-11
Payer: COMMERCIAL

## 2017-04-11 ENCOUNTER — ANESTHESIA EVENT (OUTPATIENT)
Dept: OBSTETRICS AND GYNECOLOGY | Facility: OTHER | Age: 27
End: 2017-04-11
Payer: COMMERCIAL

## 2017-04-11 ENCOUNTER — HOSPITAL ENCOUNTER (INPATIENT)
Facility: OTHER | Age: 27
LOS: 3 days | Discharge: HOME | End: 2017-04-14
Attending: OBSTETRICS & GYNECOLOGY | Admitting: OBSTETRICS & GYNECOLOGY
Payer: COMMERCIAL

## 2017-04-11 DIAGNOSIS — O34.211 MATERNAL CARE DUE TO LOW TRANSVERSE UTERINE SCAR FROM PREVIOUS CESAREAN DELIVERY: Primary | ICD-10-CM

## 2017-04-11 DIAGNOSIS — Z3A.39 39 WEEKS GESTATION OF PREGNANCY: ICD-10-CM

## 2017-04-11 LAB
ABO + RH BLD: NORMAL
BASOPHILS # BLD AUTO: 0.01 K/UL
BASOPHILS # BLD AUTO: 0.01 K/UL
BASOPHILS NFR BLD: 0.1 %
BASOPHILS NFR BLD: 0.1 %
BLD GP AB SCN CELLS X3 SERPL QL: NORMAL
DIFFERENTIAL METHOD: ABNORMAL
DIFFERENTIAL METHOD: ABNORMAL
EOSINOPHIL # BLD AUTO: 0 K/UL
EOSINOPHIL # BLD AUTO: 0.1 K/UL
EOSINOPHIL NFR BLD: 0.5 %
EOSINOPHIL NFR BLD: 1.3 %
ERYTHROCYTE [DISTWIDTH] IN BLOOD BY AUTOMATED COUNT: 16.4 %
ERYTHROCYTE [DISTWIDTH] IN BLOOD BY AUTOMATED COUNT: 16.6 %
HCT VFR BLD AUTO: 28 %
HCT VFR BLD AUTO: 32.2 %
HGB BLD-MCNC: 10.2 G/DL
HGB BLD-MCNC: 8.9 G/DL
HIV 1+2 AB+HIV1 P24 AG SERPL QL IA: NEGATIVE
HIV1+2 IGG SERPL QL IA.RAPID: NEGATIVE
LYMPHOCYTES # BLD AUTO: 1.2 K/UL
LYMPHOCYTES # BLD AUTO: 1.5 K/UL
LYMPHOCYTES NFR BLD: 15.8 %
LYMPHOCYTES NFR BLD: 20.5 %
MCH RBC QN AUTO: 24.1 PG
MCH RBC QN AUTO: 24.1 PG
MCHC RBC AUTO-ENTMCNC: 31.7 %
MCHC RBC AUTO-ENTMCNC: 31.8 %
MCV RBC AUTO: 76 FL
MCV RBC AUTO: 76 FL
MONOCYTES # BLD AUTO: 0.3 K/UL
MONOCYTES # BLD AUTO: 0.3 K/UL
MONOCYTES NFR BLD: 4.1 %
MONOCYTES NFR BLD: 4.1 %
NEUTROPHILS # BLD AUTO: 5.2 K/UL
NEUTROPHILS # BLD AUTO: 6.2 K/UL
NEUTROPHILS NFR BLD: 73.6 %
NEUTROPHILS NFR BLD: 79.2 %
PLATELET # BLD AUTO: 129 K/UL
PLATELET # BLD AUTO: 152 K/UL
PMV BLD AUTO: 9.5 FL
PMV BLD AUTO: 9.6 FL
RBC # BLD AUTO: 3.69 M/UL
RBC # BLD AUTO: 4.23 M/UL
RPR SER QL: NORMAL
RPR SER QL: NORMAL
WBC # BLD AUTO: 7.08 K/UL
WBC # BLD AUTO: 7.78 K/UL

## 2017-04-11 PROCEDURE — 37000008 HC ANESTHESIA 1ST 15 MINUTES: Performed by: OBSTETRICS & GYNECOLOGY

## 2017-04-11 PROCEDURE — 86592 SYPHILIS TEST NON-TREP QUAL: CPT

## 2017-04-11 PROCEDURE — 25000003 PHARM REV CODE 250: Performed by: OBSTETRICS & GYNECOLOGY

## 2017-04-11 PROCEDURE — 59514 CESAREAN DELIVERY ONLY: CPT | Mod: ,,, | Performed by: ANESTHESIOLOGY

## 2017-04-11 PROCEDURE — 25000003 PHARM REV CODE 250: Performed by: ANESTHESIOLOGY

## 2017-04-11 PROCEDURE — 59514 CESAREAN DELIVERY ONLY: CPT | Mod: 82,GB,, | Performed by: OBSTETRICS & GYNECOLOGY

## 2017-04-11 PROCEDURE — 63600175 PHARM REV CODE 636 W HCPCS: Performed by: ANESTHESIOLOGY

## 2017-04-11 PROCEDURE — 87340 HEPATITIS B SURFACE AG IA: CPT

## 2017-04-11 PROCEDURE — 59510 CESAREAN DELIVERY: CPT | Mod: GB,,, | Performed by: OBSTETRICS & GYNECOLOGY

## 2017-04-11 PROCEDURE — 25000003 PHARM REV CODE 250: Performed by: STUDENT IN AN ORGANIZED HEALTH CARE EDUCATION/TRAINING PROGRAM

## 2017-04-11 PROCEDURE — 36415 COLL VENOUS BLD VENIPUNCTURE: CPT

## 2017-04-11 PROCEDURE — 86703 HIV-1/HIV-2 1 RESULT ANTBDY: CPT

## 2017-04-11 PROCEDURE — 63600175 PHARM REV CODE 636 W HCPCS: Performed by: OBSTETRICS & GYNECOLOGY

## 2017-04-11 PROCEDURE — 86701 HIV-1ANTIBODY: CPT

## 2017-04-11 PROCEDURE — 11000001 HC ACUTE MED/SURG PRIVATE ROOM

## 2017-04-11 PROCEDURE — 51702 INSERT TEMP BLADDER CATH: CPT

## 2017-04-11 PROCEDURE — 86762 RUBELLA ANTIBODY: CPT

## 2017-04-11 PROCEDURE — 36000685 HC CESAREAN SECTION LEVEL I

## 2017-04-11 PROCEDURE — 85025 COMPLETE CBC W/AUTO DIFF WBC: CPT | Mod: 91

## 2017-04-11 PROCEDURE — 37000009 HC ANESTHESIA EA ADD 15 MINS: Performed by: OBSTETRICS & GYNECOLOGY

## 2017-04-11 PROCEDURE — 86901 BLOOD TYPING SEROLOGIC RH(D): CPT

## 2017-04-11 PROCEDURE — 86900 BLOOD TYPING SEROLOGIC ABO: CPT

## 2017-04-11 RX ORDER — NALBUPHINE HYDROCHLORIDE 10 MG/ML
5 INJECTION, SOLUTION INTRAMUSCULAR; INTRAVENOUS; SUBCUTANEOUS ONCE
Status: COMPLETED | OUTPATIENT
Start: 2017-04-11 | End: 2017-04-11

## 2017-04-11 RX ORDER — MORPHINE SULFATE 0.5 MG/ML
INJECTION, SOLUTION EPIDURAL; INTRATHECAL; INTRAVENOUS
Status: DISCONTINUED | OUTPATIENT
Start: 2017-04-11 | End: 2017-04-11

## 2017-04-11 RX ORDER — ONDANSETRON HYDROCHLORIDE 2 MG/ML
INJECTION, SOLUTION INTRAMUSCULAR; INTRAVENOUS
Status: DISCONTINUED | OUTPATIENT
Start: 2017-04-11 | End: 2017-04-11

## 2017-04-11 RX ORDER — DIPHENHYDRAMINE HCL 25 MG
25 CAPSULE ORAL EVERY 4 HOURS PRN
Status: DISCONTINUED | OUTPATIENT
Start: 2017-04-11 | End: 2017-04-14 | Stop reason: HOSPADM

## 2017-04-11 RX ORDER — KETOROLAC TROMETHAMINE 30 MG/ML
30 INJECTION, SOLUTION INTRAMUSCULAR; INTRAVENOUS EVERY 6 HOURS
Status: DISCONTINUED | OUTPATIENT
Start: 2017-04-11 | End: 2017-04-11 | Stop reason: SDUPTHER

## 2017-04-11 RX ORDER — BUPIVACAINE HYDROCHLORIDE 7.5 MG/ML
INJECTION, SOLUTION EPIDURAL; RETROBULBAR
Status: DISCONTINUED | OUTPATIENT
Start: 2017-04-11 | End: 2017-04-11

## 2017-04-11 RX ORDER — FAMOTIDINE 10 MG/ML
20 INJECTION INTRAVENOUS ONCE
Status: COMPLETED | OUTPATIENT
Start: 2017-04-11 | End: 2017-04-11

## 2017-04-11 RX ORDER — SODIUM CHLORIDE, SODIUM LACTATE, POTASSIUM CHLORIDE, CALCIUM CHLORIDE 600; 310; 30; 20 MG/100ML; MG/100ML; MG/100ML; MG/100ML
INJECTION, SOLUTION INTRAVENOUS CONTINUOUS
Status: CANCELLED | OUTPATIENT
Start: 2017-04-11

## 2017-04-11 RX ORDER — CEFAZOLIN SODIUM 2 G/50ML
2 SOLUTION INTRAVENOUS
Status: COMPLETED | OUTPATIENT
Start: 2017-04-11 | End: 2017-04-11

## 2017-04-11 RX ORDER — SODIUM CHLORIDE, SODIUM LACTATE, POTASSIUM CHLORIDE, CALCIUM CHLORIDE 600; 310; 30; 20 MG/100ML; MG/100ML; MG/100ML; MG/100ML
INJECTION, SOLUTION INTRAVENOUS CONTINUOUS
Status: ACTIVE | OUTPATIENT
Start: 2017-04-11 | End: 2017-04-12

## 2017-04-11 RX ORDER — OXYCODONE AND ACETAMINOPHEN 10; 325 MG/1; MG/1
1 TABLET ORAL EVERY 4 HOURS PRN
Status: DISCONTINUED | OUTPATIENT
Start: 2017-04-11 | End: 2017-04-14 | Stop reason: HOSPADM

## 2017-04-11 RX ORDER — OXYCODONE HYDROCHLORIDE 5 MG/1
10 TABLET ORAL EVERY 4 HOURS PRN
Status: DISCONTINUED | OUTPATIENT
Start: 2017-04-11 | End: 2017-04-14 | Stop reason: HOSPADM

## 2017-04-11 RX ORDER — HYDROCORTISONE 25 MG/G
CREAM TOPICAL 3 TIMES DAILY PRN
Status: DISCONTINUED | OUTPATIENT
Start: 2017-04-11 | End: 2017-04-14 | Stop reason: HOSPADM

## 2017-04-11 RX ORDER — SIMETHICONE 80 MG
1 TABLET,CHEWABLE ORAL EVERY 6 HOURS PRN
Status: DISCONTINUED | OUTPATIENT
Start: 2017-04-11 | End: 2017-04-14 | Stop reason: HOSPADM

## 2017-04-11 RX ORDER — OXYCODONE AND ACETAMINOPHEN 5; 325 MG/1; MG/1
1 TABLET ORAL EVERY 4 HOURS PRN
Status: DISCONTINUED | OUTPATIENT
Start: 2017-04-11 | End: 2017-04-14 | Stop reason: HOSPADM

## 2017-04-11 RX ORDER — SODIUM CHLORIDE, SODIUM LACTATE, POTASSIUM CHLORIDE, CALCIUM CHLORIDE 600; 310; 30; 20 MG/100ML; MG/100ML; MG/100ML; MG/100ML
INJECTION, SOLUTION INTRAVENOUS CONTINUOUS
Status: DISCONTINUED | OUTPATIENT
Start: 2017-04-11 | End: 2017-04-14 | Stop reason: HOSPADM

## 2017-04-11 RX ORDER — DOCUSATE SODIUM 100 MG/1
200 CAPSULE, LIQUID FILLED ORAL 2 TIMES DAILY
Status: DISCONTINUED | OUTPATIENT
Start: 2017-04-11 | End: 2017-04-14 | Stop reason: HOSPADM

## 2017-04-11 RX ORDER — OXYCODONE HYDROCHLORIDE 5 MG/1
5 TABLET ORAL EVERY 4 HOURS PRN
Status: DISCONTINUED | OUTPATIENT
Start: 2017-04-11 | End: 2017-04-14 | Stop reason: HOSPADM

## 2017-04-11 RX ORDER — KETOROLAC TROMETHAMINE 30 MG/ML
30 INJECTION, SOLUTION INTRAMUSCULAR; INTRAVENOUS EVERY 6 HOURS
Status: COMPLETED | OUTPATIENT
Start: 2017-04-11 | End: 2017-04-12

## 2017-04-11 RX ORDER — ADHESIVE BANDAGE
30 BANDAGE TOPICAL 2 TIMES DAILY PRN
Status: DISCONTINUED | OUTPATIENT
Start: 2017-04-12 | End: 2017-04-14 | Stop reason: HOSPADM

## 2017-04-11 RX ORDER — ACETAMINOPHEN 325 MG/1
650 TABLET ORAL EVERY 6 HOURS
Status: COMPLETED | OUTPATIENT
Start: 2017-04-11 | End: 2017-04-12

## 2017-04-11 RX ORDER — SODIUM CITRATE AND CITRIC ACID MONOHYDRATE 334; 500 MG/5ML; MG/5ML
30 SOLUTION ORAL
Status: DISCONTINUED | OUTPATIENT
Start: 2017-04-11 | End: 2017-04-14 | Stop reason: HOSPADM

## 2017-04-11 RX ORDER — KETOROLAC TROMETHAMINE 30 MG/ML
INJECTION, SOLUTION INTRAMUSCULAR; INTRAVENOUS
Status: DISCONTINUED | OUTPATIENT
Start: 2017-04-11 | End: 2017-04-11

## 2017-04-11 RX ORDER — ACETAMINOPHEN 10 MG/ML
INJECTION, SOLUTION INTRAVENOUS
Status: DISCONTINUED | OUTPATIENT
Start: 2017-04-11 | End: 2017-04-11

## 2017-04-11 RX ORDER — IBUPROFEN 400 MG/1
800 TABLET ORAL EVERY 8 HOURS
Status: DISCONTINUED | OUTPATIENT
Start: 2017-04-12 | End: 2017-04-14 | Stop reason: HOSPADM

## 2017-04-11 RX ORDER — SODIUM CITRATE AND CITRIC ACID MONOHYDRATE 334; 500 MG/5ML; MG/5ML
30 SOLUTION ORAL ONCE
Status: COMPLETED | OUTPATIENT
Start: 2017-04-11 | End: 2017-04-11

## 2017-04-11 RX ORDER — SODIUM CITRATE AND CITRIC ACID MONOHYDRATE 334; 500 MG/5ML; MG/5ML
30 SOLUTION ORAL
Status: CANCELLED | OUTPATIENT
Start: 2017-04-11

## 2017-04-11 RX ORDER — ONDANSETRON 8 MG/1
8 TABLET, ORALLY DISINTEGRATING ORAL EVERY 8 HOURS PRN
Status: DISCONTINUED | OUTPATIENT
Start: 2017-04-11 | End: 2017-04-14 | Stop reason: HOSPADM

## 2017-04-11 RX ORDER — PHENYLEPHRINE HYDROCHLORIDE 10 MG/ML
INJECTION INTRAVENOUS
Status: DISCONTINUED | OUTPATIENT
Start: 2017-04-11 | End: 2017-04-11

## 2017-04-11 RX ORDER — OXYTOCIN 10 [USP'U]/ML
INJECTION, SOLUTION INTRAMUSCULAR; INTRAVENOUS
Status: DISCONTINUED | OUTPATIENT
Start: 2017-04-11 | End: 2017-04-11

## 2017-04-11 RX ORDER — BISACODYL 10 MG
10 SUPPOSITORY, RECTAL RECTAL ONCE AS NEEDED
Status: ACTIVE | OUTPATIENT
Start: 2017-04-11 | End: 2017-04-11

## 2017-04-11 RX ORDER — AMOXICILLIN 250 MG
1 CAPSULE ORAL NIGHTLY PRN
Status: DISCONTINUED | OUTPATIENT
Start: 2017-04-11 | End: 2017-04-14 | Stop reason: HOSPADM

## 2017-04-11 RX ORDER — METOCLOPRAMIDE HYDROCHLORIDE 5 MG/ML
10 INJECTION INTRAMUSCULAR; INTRAVENOUS ONCE
Status: COMPLETED | OUTPATIENT
Start: 2017-04-11 | End: 2017-04-11

## 2017-04-11 RX ORDER — OXYTOCIN/RINGER'S LACTATE 20/1000 ML
41.65 PLASTIC BAG, INJECTION (ML) INTRAVENOUS CONTINUOUS
Status: ACTIVE | OUTPATIENT
Start: 2017-04-11 | End: 2017-04-11

## 2017-04-11 RX ORDER — ONDANSETRON 2 MG/ML
4 INJECTION INTRAMUSCULAR; INTRAVENOUS EVERY 8 HOURS PRN
Status: DISCONTINUED | OUTPATIENT
Start: 2017-04-11 | End: 2017-04-14 | Stop reason: HOSPADM

## 2017-04-11 RX ORDER — FENTANYL CITRATE 50 UG/ML
INJECTION, SOLUTION INTRAMUSCULAR; INTRAVENOUS
Status: DISCONTINUED | OUTPATIENT
Start: 2017-04-11 | End: 2017-04-11

## 2017-04-11 RX ADMIN — ONDANSETRON 8 MG: 8 TABLET, ORALLY DISINTEGRATING ORAL at 02:04

## 2017-04-11 RX ADMIN — SODIUM CITRATE AND CITRIC ACID MONOHYDRATE 30 ML: 500; 334 SOLUTION ORAL at 11:04

## 2017-04-11 RX ADMIN — ONDANSETRON 4 MG: 2 INJECTION, SOLUTION INTRAMUSCULAR; INTRAVENOUS at 12:04

## 2017-04-11 RX ADMIN — PHENYLEPHRINE HYDROCHLORIDE 100 MCG: 10 INJECTION INTRAVENOUS at 12:04

## 2017-04-11 RX ADMIN — NALBUPHINE HYDROCHLORIDE 5 MG: 10 INJECTION, SOLUTION INTRAMUSCULAR; INTRAVENOUS; SUBCUTANEOUS at 03:04

## 2017-04-11 RX ADMIN — KETOROLAC TROMETHAMINE 30 MG: 30 INJECTION, SOLUTION INTRAMUSCULAR at 06:04

## 2017-04-11 RX ADMIN — SODIUM CHLORIDE, SODIUM LACTATE, POTASSIUM CHLORIDE, AND CALCIUM CHLORIDE 1000 ML: .6; .31; .03; .02 INJECTION, SOLUTION INTRAVENOUS at 11:04

## 2017-04-11 RX ADMIN — OXYCODONE HYDROCHLORIDE 5 MG: 5 TABLET ORAL at 05:04

## 2017-04-11 RX ADMIN — SODIUM CHLORIDE, SODIUM LACTATE, POTASSIUM CHLORIDE, AND CALCIUM CHLORIDE: 600; 310; 30; 20 INJECTION, SOLUTION INTRAVENOUS at 12:04

## 2017-04-11 RX ADMIN — FAMOTIDINE 20 MG: 10 INJECTION INTRAVENOUS at 11:04

## 2017-04-11 RX ADMIN — ACETAMINOPHEN 1000 MG: 10 INJECTION, SOLUTION INTRAVENOUS at 12:04

## 2017-04-11 RX ADMIN — ACETAMINOPHEN 650 MG: 325 TABLET ORAL at 06:04

## 2017-04-11 RX ADMIN — Medication 0.15 MG: at 12:04

## 2017-04-11 RX ADMIN — CEFAZOLIN SODIUM 2 G: 2 SOLUTION INTRAVENOUS at 12:04

## 2017-04-11 RX ADMIN — ACETAMINOPHEN 650 MG: 325 TABLET ORAL at 11:04

## 2017-04-11 RX ADMIN — KETOROLAC TROMETHAMINE 30 MG: 30 INJECTION, SOLUTION INTRAMUSCULAR; INTRAVENOUS at 12:04

## 2017-04-11 RX ADMIN — OXYTOCIN 2 UNITS: 10 INJECTION, SOLUTION INTRAMUSCULAR; INTRAVENOUS at 12:04

## 2017-04-11 RX ADMIN — FENTANYL CITRATE 10 MCG: 50 INJECTION, SOLUTION INTRAMUSCULAR; INTRAVENOUS at 12:04

## 2017-04-11 RX ADMIN — PHENYLEPHRINE HYDROCHLORIDE 200 MCG: 10 INJECTION INTRAVENOUS at 12:04

## 2017-04-11 RX ADMIN — BUPIVACAINE HYDROCHLORIDE 1.6 ML: 7.5 INJECTION, SOLUTION EPIDURAL; RETROBULBAR at 12:04

## 2017-04-11 RX ADMIN — METOCLOPRAMIDE 10 MG: 5 INJECTION, SOLUTION INTRAMUSCULAR; INTRAVENOUS at 11:04

## 2017-04-11 RX ADMIN — SODIUM CHLORIDE, SODIUM LACTATE, POTASSIUM CHLORIDE, AND CALCIUM CHLORIDE: 600; 310; 30; 20 INJECTION, SOLUTION INTRAVENOUS at 04:04

## 2017-04-11 RX ADMIN — SODIUM CHLORIDE, SODIUM LACTATE, POTASSIUM CHLORIDE, AND CALCIUM CHLORIDE: 600; 310; 30; 20 INJECTION, SOLUTION INTRAVENOUS at 01:04

## 2017-04-11 RX ADMIN — KETOROLAC TROMETHAMINE 30 MG: 30 INJECTION, SOLUTION INTRAMUSCULAR at 11:04

## 2017-04-11 RX ADMIN — OXYTOCIN 1 UNITS: 10 INJECTION, SOLUTION INTRAMUSCULAR; INTRAVENOUS at 01:04

## 2017-04-11 RX ADMIN — DOCUSATE SODIUM 200 MG: 100 CAPSULE, LIQUID FILLED ORAL at 11:04

## 2017-04-11 RX ADMIN — Medication 41.65 MILLI-UNITS/MIN: at 01:04

## 2017-04-11 NOTE — PROGRESS NOTES
Breastpump initiated. Pt. Tolerated well. Instructed pt. to pump 8 or more within 24 hrs. Pt. verbalized understanding.

## 2017-04-11 NOTE — L&D DELIVERY NOTE
[facility]     Section     Operative Note      SUMMARY    Date of Procedure: 2017     Procedure: Procedure(s) (LRB):  DELIVERY- SECTION (N/A)    Surgeon(s) and Role:     * Robert Shields MD - Primary     * Starla Cuenca DO    Assisting Surgeon: None    Pre-Operative Diagnosis:   IUP at 39 weeks    Bijan Breech    Post-Operative Diagnosis: Post-Op Diagnosis Codes:  same      Anesthesia: Spinal/Epidural      Technical Procedures Used: LTCS               Description of the Findings of the Procedure:   NOTE: There was no qualified resident available at the time of surgery.    Procedure.  The patient was taken to the operating room awake and alert in stable condition.  After assuring informed consent the patient was taken to the operating room where spinal anesthesia was administered.  She was then prepped and draped in the usual sterile fashion in the dorsal supine position with a Victoria catheter and SCDs in place.  A Pfannenstiel incision was then made with a scalpel and carried down to the underlying fascia.  The fascia was then nicked in the midline and extended bilaterally with the curved Funes scissors.  The fascia was gently elevated up from the underlying rectus muscles.  The rectus muscles were then  in the midline and the peritoneum was identified and entered without difficulty with sharp dissection.  The peritoneum was then extended superiorly and inferiorly with good visuaProlization of the bladder.  A bladder blade was then inserted and the vesicouterine peritoneum was identified and a bladder flap was created with sharp dissection.  A low transverse incision was made on the uterus and extended bilaterally manually.  The membranes were identified and ruptured.  Clear fluid was noted.  The infant's buttock was delivered without difficulty then the legs and arms then the head easily in the flexed presentation the mouth and nose were suctioned with the bulb  suction.  The cord was clamped and cut.  And the female infant was handed off to the waiting  team.  The infant had a loud vigorous cry at birth and Apgars were 8/9. Cord blood was then obtained and the placenta was then removed manually.  The uterus was then exteriorized and the uterus was cleared of all clots and debris.  The uterine incision was closed with a 0 PDS in a running locking fashion.  A second imbricating stitch on the uterine incision  was also used for hemostasis. The uterus was then returned to the patient's abdomen.  A second look at the uterine incision was again noted to be hemostatic.  The peritoneum was then closed with a 2-0 Vicryl in a running fashion.  The fascia was closed with a #1 Vicryl in a running fashion.  Copious amounts of irrigation was performed and any small areas  of bleeding were cauterized with electrocautery.  The subcutaneous layer was closed with a 2-0 plain and the skin was closed with a 4-0 Monocryl in subcuticular stitch.  The patient tolerated the procedure well, sponge lap and needle counts were correct ×2 and the patient was taken to the recovery area awake alert in stable condition.             Significant Surgical Tasks Conducted by the Assistant(s), if Applicable:       Complications: No      Estimated Blood Loss (EBL): 500 mL               Implants: * No implants in log *      Specimens: Specimen     None          Condition: Good      Disposition: PACU - hemodynamically stable.      Attestation: Good    Delivery Information for  Girl Leydi Matias    Birth information:  YOB: 2019   Time of birth: 12:43 PM   Sex: female   Head Delivery Date/Time: 2017 12:43 PM   Delivery type: , Low Transverse   Gestational Age: 39w0d    Delivery Providers    Delivering clinician:  JARET ESTEBAN   Other personnel:   Provider Role   FABY ROMAN KRISTY Registered Nurse   AGAPITO HELMS Technician                  [place]  Measurements    Weight:  3560 g Length:  49.5 cm   Head circum.:  38.7 cm Chest circum.:  35.6 cm          [place] Assessment    Living status:  Yes   Apgars    1 Minute:   5 Minute:   10 Minute 15 Minute 20 Minute   Skin Color: 0  1       Heart Rate: 2  2       Reflex Irritability: 2  2       Muscle Tone: 2  2       Respiratory Effort: 2  2       Total: 8  9                  Apgars Assigned By:  NICU          Assisted Delivery Details:    Forceps attempted?:  No   Vacuum extractor attempted?:  No             Shoulder Dystocia    Shoulder dystocia present?:  No                                             Presentation and Position    Presentation: Vertex   Position:                    Interventions/Resuscitation    Method:  NICU Attended        Cord    Vessels:  3 vessels   Complications:  None   Delayed Cord Clamping?:  No   Cord Clamped Date/Time:  2017 12:43 PM   Cord Blood Disposition:  Lab, Sent with Baby   Gases Sent?:  No   Stem Cell Collection (by MD):  No        Placenta    Date and time:  2017 12:44 PM   Removal:  Manual removal   Appearance:  Intact   Placenta disposition:  Discarded            Labor Events:       labor: No     Labor Onset Date/Time:         Dilation Complete Date/Time:         Start Pushing Date/Time:       Rupture Date/Time:              Rupture type:           Fluid Amount:        Fluid Color:        Fluid Odor:        Membrane Status (PeriCalm):        Rupture Date/Time (PeriCalm):        Fluid Amount (PeriCalm):        Fluid Color (PeriCalm):         steroids: None     Antibiotics given for GBS: No     Induction: none     Indications for induction:        Augmentation:       Indications for augmentation:       Labor complications: None     Additional complications:          Cervical ripening:                     Delivery:      Episiotomy: None     Indication for Episiotomy:       Perineal Lacerations: None Repaired:      Periurethral Laceration: none  Repaired:     Labial Laceration: none Repaired:     Sulcus Laceration: none Repaired:     Vaginal Laceration: No Repaired:     Cervical Laceration: No Repaired:     Repair suture:       Repair # of packets:       Blood loss (ml): 500     Vaginal Sweep Performed: No     Surgicount Correct: Yes       Other providers:       Anesthesia    Method:  Spinal              Details (if applicable):  Trial of Labor No    Categorization: Primary    Priority: Routine   Indications for : Breech   Incision Type: Low Transverse     Additional  information:  Forceps:    Vacuum:    Breech:    Observed anomalies    Other (Comments):

## 2017-04-11 NOTE — ANESTHESIA PROCEDURE NOTES
Spinal    Diagnosis: Primary  Delivery for Breech Presentation  Patient location during procedure: OR  Start time: 2017 12:24 PM  Timeout: 2017 12:24 PM  End time: 2017 12:26 PM  Staffing  Anesthesiologist: NANETTE ANAND  Resident/CRNA: ZACKARY SANCHEZ  Performed by: anesthesiologist and resident/CRNA   Preanesthetic Checklist  Completed: patient identified, site marked, surgical consent, pre-op evaluation, timeout performed, IV checked, risks and benefits discussed and monitors and equipment checked  Spinal Block  Patient position: sitting  Prep: ChloraPrep  Patient monitoring: heart rate and continuous pulse ox  Approach: midline  Location: L4-5  Injection technique: single shot  CSF Fluid: clear free-flowing CSF  Needle  Needle type: pencil-tip   Needle gauge: 25 G  Needle length: 3.5 in  Additional Documentation: incremental injection, negative aspiration for heme and no paresthesia on injection  Needle localization: anatomical landmarks  Assessment  Ease of block: easy  Patient's tolerance of the procedure: comfortable throughout block and no complaints  Medications:  Bolus administered: 1.6 mL of 0.75 bupivacaine  Opioid administered: 10 mcg of   fentanyl

## 2017-04-11 NOTE — LACTATION NOTE
Educated pt on use of breastpump using preemie plus setting, cleaning of kit and storage of breastmilk. Questions answered.

## 2017-04-11 NOTE — H&P
HISTORY AND PHYSICAL                                                OBSTETRICS          Subjective:       Leydi Matias is a 27 y.o.  female with IUP at 39w0d weeks gestation who presents to L&D for primary c/s for Breech. Pertinent medical history for this pregnancy include  HSV on suppression  Patient denies contractions, denies vaginal bleeding, denies LOF.   Fetal Movement: normal.     PMHx:   Past Medical History:   Diagnosis Date    Acid reflux        PSHx:   Past Surgical History:   Procedure Laterality Date    BREAST SURGERY      AUGMENTATION    TONSILLECTOMY         All: Review of patient's allergies indicates:  No Known Allergies    Meds:   (Not in a hospital admission)    SH:   Social History     Social History    Marital status:      Spouse name: N/A    Number of children: N/A    Years of education: N/A     Occupational History    Not on file.     Social History Main Topics    Smoking status: Never Smoker    Smokeless tobacco: Not on file    Alcohol use No    Drug use: No    Sexual activity: Not Currently     Partners: Male     Birth control/ protection: None     Other Topics Concern    Not on file     Social History Narrative       FH:   Family History   Problem Relation Age of Onset    Breast cancer Mother 45    Hyperlipidemia Mother     Cancer Mother     Colon cancer Neg Hx     Ovarian cancer Neg Hx        OBHx:   Obstetric History       T0      TAB0   SAB0   E0   M0   L0       # Outcome Date GA Lbr Rocco/2nd Weight Sex Delivery Anes PTL Lv   1 Current                   Objective:       /78  Wt 97.3 kg (214 lb 8.1 oz)  LMP 2016 (Exact Date)  BMI 33.6 kg/m2    Vitals:    17 1346   BP: 110/78   Weight: 97.3 kg (214 lb 8.1 oz)       General:   alert and cooperative   Lungs:   clear to auscultation bilaterally   Heart:   regular rate and rhythm, S1, S2 normal, no murmur, click, rub or gallop   Abdomen:  soft, non-tender; bowel sounds  normal; no masses,  no organomegaly   Extremities negative edema, negative erythema   FHT: 130's Cat 1 (reassuring)                 TOCO: Irregular contractions       Cervix:     Dilation: Closed    Effacement: 25%    Station:  -3    Consistency: soft    Position: anterior        Assessment:       39w0d weeks gestation.  Breech presentation  Late transfer    There are no hospital problems to display for this patient.         Plan:      Risks, benefits, alternatives and possible complications have been discussed in detail with the patient.   - Consents signed and to chart  - Admit to Labor and Delivery unit  - Plan for Primary LTCS today

## 2017-04-11 NOTE — TRANSFER OF CARE
"Anesthesia Transfer of Care Note    Patient: Leydi Matias    Procedure(s) Performed: Procedure(s) (LRB):  DELIVERY- SECTION (N/A)    Patient location: Labor and Delivery    Anesthesia Type: spinal    Transport from OR: Transported from OR on room air with adequate spontaneous ventilation    Post pain: adequate analgesia    Post assessment: no apparent anesthetic complications    Post vital signs: stable    Level of consciousness: awake    Nausea/Vomiting: no nausea/vomiting    Complications: none          Last vitals:   Visit Vitals    /71    Pulse (!) 113    Temp 36.6 °C (97.9 °F) (Tympanic)    Resp 18    Ht 5' 7" (1.702 m)    Wt 97.5 kg (215 lb)    LMP 2016 (Exact Date)    SpO2 97%    Breastfeeding No    BMI 33.67 kg/m2     "

## 2017-04-11 NOTE — INTERVAL H&P NOTE
The patient has been examined and the H&P has been reviewed:    I concur with the findings and no changes have occurred since H&P was written.  Consents signed for  delivery and blood transfusion.  All questions answered.        Active Hospital Problems    Diagnosis  POA    Breech birth [O32.1XX0]  Yes      Resolved Hospital Problems    Diagnosis Date Resolved POA   No resolved problems to display.     Starla Cuenca DO    
sudden onset

## 2017-04-12 LAB — HBV SURFACE AG SERPL QL IA: NEGATIVE

## 2017-04-12 PROCEDURE — 11000001 HC ACUTE MED/SURG PRIVATE ROOM

## 2017-04-12 PROCEDURE — 25000003 PHARM REV CODE 250: Performed by: OBSTETRICS & GYNECOLOGY

## 2017-04-12 PROCEDURE — 25000003 PHARM REV CODE 250: Performed by: STUDENT IN AN ORGANIZED HEALTH CARE EDUCATION/TRAINING PROGRAM

## 2017-04-12 PROCEDURE — 63600175 PHARM REV CODE 636 W HCPCS: Performed by: OBSTETRICS & GYNECOLOGY

## 2017-04-12 RX ADMIN — IBUPROFEN 800 MG: 400 TABLET, FILM COATED ORAL at 05:04

## 2017-04-12 RX ADMIN — DOCUSATE SODIUM 200 MG: 100 CAPSULE, LIQUID FILLED ORAL at 07:04

## 2017-04-12 RX ADMIN — ACETAMINOPHEN 650 MG: 325 TABLET ORAL at 01:04

## 2017-04-12 RX ADMIN — STANDARDIZED SENNA CONCENTRATE AND DOCUSATE SODIUM 1 TABLET: 8.6; 5 TABLET, FILM COATED ORAL at 05:04

## 2017-04-12 RX ADMIN — DIPHENHYDRAMINE HYDROCHLORIDE 25 MG: 25 CAPSULE ORAL at 07:04

## 2017-04-12 RX ADMIN — OXYCODONE HYDROCHLORIDE AND ACETAMINOPHEN 1 TABLET: 5; 325 TABLET ORAL at 08:04

## 2017-04-12 RX ADMIN — ACETAMINOPHEN 650 MG: 325 TABLET ORAL at 05:04

## 2017-04-12 RX ADMIN — DOCUSATE SODIUM 200 MG: 100 CAPSULE, LIQUID FILLED ORAL at 08:04

## 2017-04-12 RX ADMIN — KETOROLAC TROMETHAMINE 30 MG: 30 INJECTION, SOLUTION INTRAMUSCULAR at 01:04

## 2017-04-12 RX ADMIN — KETOROLAC TROMETHAMINE 30 MG: 30 INJECTION, SOLUTION INTRAMUSCULAR at 05:04

## 2017-04-12 RX ADMIN — OXYCODONE HYDROCHLORIDE 5 MG: 5 TABLET ORAL at 01:04

## 2017-04-12 NOTE — PLAN OF CARE
Problem: Patient Care Overview  Goal: Plan of Care Review  Outcome: Ongoing (interventions implemented as appropriate)  Fundus firm and midline with light to moderate bleeding. Incision covered with surgical dressing with no drainage or s/s of infection. Victoria removed and Pt up ad antionette and doing self cares. NO c/o pain well controlled with scheduled medications. VSS. No distress noted

## 2017-04-12 NOTE — PROGRESS NOTES
Progress Note  Ob    Admit Date: 2017   LOS: 1 day     Reason for Admission:   delivery delivered    SUBJECTIVE:     Leydi Matias is a 27 y.o.  who is POD1 s/p LTCS at 39wks for breech.  Patient doing well, mild lochia, pain controlled. Baby in NICU for respiratory issues but already is improving and she come back to floor by tomorrow hopefully.     Scheduled Meds:   docusate sodium  200 mg Oral BID    ibuprofen  800 mg Oral Q8H     Continuous Infusions:   lactated ringers Stopped (17 0102)     PRN Meds:citric acid-sodium citrate 500-334 mg/5 ml, diphenhydrAMINE, hydrocortisone, lactated ringers, lanolin, magnesium hydroxide 400 mg/5 ml, measles, mumps and rubella vaccine, ondansetron, ondansetron, oxycodone, oxycodone, oxycodone-acetaminophen, oxycodone-acetaminophen, promethazine (PHENERGAN) IVPB, promethazine (PHENERGAN) IVPB, senna-docusate 8.6-50 mg, simethicone, DIPH,PERTUS (ADACEL),TETANUS PF VAC (ADULT)    Review of patient's allergies indicates:  No Known Allergies    OBJECTIVE:     Vital Signs (Most Recent)  Temp: 97.8 °F (36.6 °C) (17 0800)  Pulse: 79 (17 0800)  Resp: 18 (17 0800)  BP: (!) 101/57 (17 0800)  SpO2: 99 % (17 0800)    Temperature Range Min/Max (Last 24H):  Temp:  [97.8 °F (36.6 °C)-98.5 °F (36.9 °C)]     Vital Signs Range (Last 24H):  Temp:  [97.8 °F (36.6 °C)-98.5 °F (36.9 °C)]   Pulse:  [74-90]   Resp:  [18]   BP: (101-125)/(56-73)   SpO2:  [94 %-100 %]     I & O (Last 24H):  Intake/Output Summary (Last 24 hours) at 17 1506  Last data filed at 17 0600   Gross per 24 hour   Intake                0 ml   Output             1175 ml   Net            -1175 ml     Physical Exam:  Gen: NAD, A&O x 3  ABD: soft nontender, nondistended, incision c/d/i  Ext: no c/c/e    Laboratory:  CBC:   Recent Labs  Lab 17  2100   WBC 7.78   RBC 3.69*   HGB 8.9*   HCT 28.0*   *   MCV 76*   MCH 24.1*   MCHC 31.8*            ASSESSMENT/PLAN:     Active Hospital Problems    Diagnosis  POA    * delivery delivered [O82]  Unknown    Breech birth [O32.1XX0]  Yes      Resolved Hospital Problems    Diagnosis Date Resolved POA   No resolved problems to display.     Assessment: POD1 s/p LTCS at 39wks for breech    Plan: routine care..

## 2017-04-12 NOTE — ANESTHESIA POSTPROCEDURE EVALUATION
"Anesthesia Post Evaluation    Patient: Leydi Matias    Procedure(s) Performed: Procedure(s) (LRB):  DELIVERY- SECTION (N/A)    Final Anesthesia Type: spinal  Patient location during evaluation: PACU  Patient participation: Yes- Able to Participate  Level of consciousness: awake and alert  Post-procedure vital signs: reviewed and stable  Pain management: adequate  Airway patency: patent  PONV status at discharge: No PONV  Anesthetic complications: no      Cardiovascular status: hemodynamically stable  Respiratory status: unassisted, spontaneous ventilation and room air  Hydration status: euvolemic  Follow-up not needed.        Visit Vitals    BP (!) 101/57    Pulse 79    Temp 36.6 °C (97.8 °F) (Oral)    Resp 18    Ht 5' 7" (1.702 m)    Wt 97.5 kg (215 lb)    LMP 2016 (Exact Date)    SpO2 99%    Breastfeeding Unknown    BMI 33.67 kg/m2       Pain/Deedee Score: Pain Rating Prior to Med Admin: 6 (2017  1:55 PM)  Pain Rating Post Med Admin: 2 (2017  6:10 AM)      "

## 2017-04-12 NOTE — LACTATION NOTE
Pt informed me she would be allowed to breastfeed baby in nicu for the 1200 feeding. Encouraged pt to go early for skin to skin and to call for assistance with breastpump once returned to room. Pt has lc number on whiteboard.

## 2017-04-12 NOTE — LACTATION NOTE
"This note was copied from a baby's chart.     17 1200   Infant Information   Infant's Name Lisa   Maternal Infant Assessment   Breast Shape Right:;round   Breast Density Right:;soft   Areola Right:;elastic   Nipple(s) Right:;everted   LATCH Score   Latch 0-->too sleepy or reluctant, no latch achieved   Audible Swallowing 0-->none   Type Of Nipple 2-->everted (after stimulation)   Comfort (Breast/Nipple) 2-->soft/nontender   Hold (Positioning) 0-->full assist (staff holds infant at breast)   Score (less than 7 for 2/more consecutive times, consult Lactation Consultant) 4   Pain/Comfort Assessments   Acceptable Comfort Level 0   Maternal Infant Feeding   Maternal Emotional State assist needed   Infant Positioning clutch/"football"   Presence of Pain no   Time Spent (min) 15-30 min   Milk Ejection Reflex absent   Latch Assistance yes   Breastfeeding Education adequate milk volume;increasing milk supply;milk expression, electric pump;milk expression, hand;importance of skin-to-skin contact   Infant First Feeding   Breastfeeding breastfeeding, right side only   Breastfeeding Left Side (min) 0 Min   Breastfeeding Right Side (min) 0 Min   Feeding Infant   Feeding Readiness Cues hand to mouth movements;nonnutritive sucking   Satiety Cues sleeping after feeding   Feeding Tolerance/Success arousal required;disinterested   Feeding Physical Stress Cues color unchanged;heart rate unchanged;respirations unchanged   Effective Latch During Feeding no   Audible Swallow no   Skin-to-Skin Contact During Feeding no   Supplementation   Nipple Used For Feeding slow flow   Method of Supplementation bottle   Lactation Referrals   Lactation Consult Breastfeeding assessment;Initial assessment    Breastfeeding   Breast Pumping Interventions frequent pumping encouraged   Lactation Interventions   Attachment Promotion breastfeeding assistance provided;privacy provided;skin-to-skin contact encouraged   Breastfeeding Assistance " assisted with positioning;feeding cue recognition promoted;feeding session observed;infant stimulated to wakeful state;supplemental feeding provided;support offered   Maternal Breastfeeding Support encouragement offered;lactation counseling provided   Latch Promotion infant moved to breast;positioning assisted;suck stimulated with colostrum drop

## 2017-04-13 LAB
RUBV IGG SER-ACNC: 19.1 IU/ML
RUBV IGG SER-IMP: REACTIVE

## 2017-04-13 PROCEDURE — 11000001 HC ACUTE MED/SURG PRIVATE ROOM

## 2017-04-13 PROCEDURE — 25000003 PHARM REV CODE 250: Performed by: OBSTETRICS & GYNECOLOGY

## 2017-04-13 RX ADMIN — DOCUSATE SODIUM 200 MG: 100 CAPSULE, LIQUID FILLED ORAL at 10:04

## 2017-04-13 RX ADMIN — OXYCODONE HYDROCHLORIDE AND ACETAMINOPHEN 1 TABLET: 10; 325 TABLET ORAL at 05:04

## 2017-04-13 RX ADMIN — OXYCODONE HYDROCHLORIDE AND ACETAMINOPHEN 1 TABLET: 10; 325 TABLET ORAL at 11:04

## 2017-04-13 RX ADMIN — IBUPROFEN 800 MG: 400 TABLET, FILM COATED ORAL at 01:04

## 2017-04-13 RX ADMIN — OXYCODONE HYDROCHLORIDE AND ACETAMINOPHEN 1 TABLET: 10; 325 TABLET ORAL at 07:04

## 2017-04-13 RX ADMIN — DOCUSATE SODIUM 200 MG: 100 CAPSULE, LIQUID FILLED ORAL at 08:04

## 2017-04-13 RX ADMIN — IBUPROFEN 800 MG: 400 TABLET, FILM COATED ORAL at 08:04

## 2017-04-13 RX ADMIN — OXYCODONE HYDROCHLORIDE AND ACETAMINOPHEN 1 TABLET: 10; 325 TABLET ORAL at 01:04

## 2017-04-13 RX ADMIN — IBUPROFEN 800 MG: 400 TABLET, FILM COATED ORAL at 11:04

## 2017-04-13 NOTE — PLAN OF CARE
Problem: Patient Care Overview  Goal: Plan of Care Review  Outcome: Ongoing (interventions implemented as appropriate)  Patient safety maintained, side rails up, bed low and locked position. Pt ambulating and voiding spontaneously without difficulty. Pain well controlled with PRN pain medication. Fundus midline, firm, with moderate  lochia. Parents responding to infant cues.  Incision open to air, well approximated with no s/s of infection.  Significant other at bedside and assisting in patient's care. Will continue to monitor.

## 2017-04-13 NOTE — PROGRESS NOTES
PostPartum Progress Note        Subjective:      Post-Operative Day #2 after  delivery secondary to breech presentation.    Patient is without complaints. Lochia less than menses. Breast feeding. Pain iswell controlled. Patient is ambulating. Tolerating Full Regular diet.Overall mother and baby are doing well.     Objective:      Temp:  [97.5 °F (36.4 °C)-97.6 °F (36.4 °C)] 97.5 °F (36.4 °C)  Pulse:  [82-97] 93  Resp:  [18] 18  SpO2:  [98 %-99 %] 98 %  BP: ()/(50-65) 113/59    Intake/Output Summary (Last 24 hours) at 17 1217  Last data filed at 17 1700   Gross per 24 hour   Intake              600 ml   Output             1000 ml   Net             -400 ml     Body mass index is 33.67 kg/(m^2).    General: no acute distress  Abdomen: soft, non-tender, non-distended; Fundus firm and at the umbilicus  Incision- intact, healing well, no sign of infection  Extremities: non-tender, symmetric, 1+ edema    Group & Rh   Date Value Ref Range Status   2017 A POS  Corrected     Recent Results (from the past 336 hour(s))   CBC auto differential    Collection Time: 17  9:00 PM   Result Value Ref Range    WBC 7.78 3.90 - 12.70 K/uL    Hemoglobin 8.9 (L) 12.0 - 16.0 g/dL    Hematocrit 28.0 (L) 37.0 - 48.5 %    Platelets 129 (L) 150 - 350 K/uL   CBC auto differential    Collection Time: 17 11:00 AM   Result Value Ref Range    WBC 7.08 3.90 - 12.70 K/uL    Hemoglobin 10.2 (L) 12.0 - 16.0 g/dL    Hematocrit 32.2 (L) 37.0 - 48.5 %    Platelets 152 150 - 350 K/uL          Assessment:     27 y.o.  S/P  Delivery Post-Operative Day #2  - Doing Well      Plan:     1. Continue routine postpartum care  2. Plan for D/C in      Starla Cuenca DO

## 2017-04-13 NOTE — PLAN OF CARE
Problem: Patient Care Overview  Goal: Individualization & Mutuality  Outcome: Ongoing (interventions implemented as appropriate)  Plan of care reviewed with patient and spouse; verbalized understanding. Patient verbalizes pain relief and ambulates without difficulty. Patient cares for infant appropriately. Patient reports light lochia and denies complaints.

## 2017-04-14 VITALS
BODY MASS INDEX: 33.74 KG/M2 | SYSTOLIC BLOOD PRESSURE: 101 MMHG | HEART RATE: 88 BPM | TEMPERATURE: 98 F | HEIGHT: 67 IN | RESPIRATION RATE: 18 BRPM | DIASTOLIC BLOOD PRESSURE: 63 MMHG | WEIGHT: 215 LBS | OXYGEN SATURATION: 99 %

## 2017-04-14 PROCEDURE — 25000003 PHARM REV CODE 250: Performed by: OBSTETRICS & GYNECOLOGY

## 2017-04-14 RX ORDER — OXYCODONE AND ACETAMINOPHEN 5; 325 MG/1; MG/1
1 TABLET ORAL EVERY 4 HOURS PRN
Qty: 30 TABLET | Refills: 0 | Status: SHIPPED | OUTPATIENT
Start: 2017-04-14 | End: 2017-04-27

## 2017-04-14 RX ORDER — DOCUSATE SODIUM 100 MG/1
200 CAPSULE, LIQUID FILLED ORAL 2 TIMES DAILY
Refills: 0 | COMMUNITY
Start: 2017-04-14 | End: 2017-04-27

## 2017-04-14 RX ORDER — IBUPROFEN 800 MG/1
800 TABLET ORAL EVERY 8 HOURS
Qty: 30 TABLET | Refills: 1 | Status: SHIPPED | OUTPATIENT
Start: 2017-04-14 | End: 2017-05-25

## 2017-04-14 RX ADMIN — IBUPROFEN 800 MG: 400 TABLET, FILM COATED ORAL at 03:04

## 2017-04-14 RX ADMIN — OXYCODONE HYDROCHLORIDE AND ACETAMINOPHEN 1 TABLET: 10; 325 TABLET ORAL at 11:04

## 2017-04-14 RX ADMIN — IBUPROFEN 800 MG: 400 TABLET, FILM COATED ORAL at 11:04

## 2017-04-14 RX ADMIN — DOCUSATE SODIUM 200 MG: 100 CAPSULE, LIQUID FILLED ORAL at 09:04

## 2017-04-14 NOTE — DISCHARGE INSTRUCTIONS
Breastfeeding Discharge Instructions       Feed the baby at the earliest sign of hunger or comfort  o Hands to mouth, sucking motions  o Rooting or searching for something to suck on  o Dont wait for crying - it is a sign of distress     The feedings may be 8-12 times per 24hrs and will not follow a schedule   Avoid pacifiers and bottles for the first 4 weeks   Alternate the breast you start the feeding with, or start with the breast that feels the fullest   Switch breasts when the baby takes himself off the breast or falls asleep   Keep offering breasts until the baby looks full, no longer gives hunger signs, and stays asleep when placed on his back in the crib   If the baby is sleepy and wont wake for a feeding, put the baby skin-to-skin dressed in a diaper against the mothers bare chest   Sleep near your baby   The baby should be positioned and latched on to the breast correctly  o Chest-to-chest, chin in the breast  o Babys lips are flipped outward  o Babys mouth is stretched open wide like a shout  o Babys sucking should feel like tugging to the mother  - The baby should be drinking at the breast:  o You should hear swallowing or gulping throughout the feeding  o You should see milk on the babys lips when he comes off the breast  o Your breasts should be softer when the baby is finished feeding  o The baby should look relaxed at the end of feedings  o After the 4th day and your milk is in:  o The babys poop should turn bright yellow and be loose, watery, and seedy  o The baby should have at least 3-4 poops the size of the palm of your hand per day  o The baby should have at least 5-6 wet diapers per day  o The urine should be light yellow in color  You should drink when you are thirsty and eat a healthy diet when you are    hungry.     Take naps to get the rest you need.   Take medications and/or drink alcohol only with permission of your obstetrician    or the babys pediatrician.  You can  also call the [facility] Risk [facility],   ([phone]), Monday-Friday, 8am-5pm Central time, to get the most   up-to-date evidence-based information on the use of medications during   pregnancy and breastfeeding.      The baby should be examined by a pediatrician at 3-5 days of age.  Once your   milk comes in, the baby should be gaining at least ½ - 1oz each day and should be back to birthweight no later than 10-14 days of age.          Community Resources    [facility] Breastfeeding Warmline: [phone]   Local [facility] clinics: provide incentives and breastpumps to eligible mothers  [employer] (LLLI):  mother-to-mother support group website        www.Bell Boardz.TriOviz  Local La Leche League mother-to-mother support groups:        www.[employer]s        La Leche League [facility]   Dr. Dixon Roman website for latch videos and general information:        www.breastfeedinginc.ca  [facility] is a call center that provides information about the safety of taking medications while breastfeeding.  Call 1-750.403.9756, M-F, 8am-5pm, [place].  International Lactation Consultant Association provides resources for assistance:        www.ilca.org  [facility] Breastfeeding Coalition provides informationand resources for parents  and the community    http://Nemours Foundationastfeeding.org     Adela Mathias is a mom-to-mom support group:                             www.nolanesting.Newforma//breastfeedng-support/  Part[employer] for Healthy Babies:  1-646-265-BABY(6241)  [employer]: a breastfeeding support group for women of color, 322.666.3515    Preparation and Hygiene:    1. Shower daily.  2. Wear a clean bra each day and wash daily in warm soapy water.  3. Change wet or moist breast pads frequently.  Moist pads can promote growth of germs.  4. Actively wash your hands, paying close attention to the area around and under your fingernails, thoroughly with soap and water for 15 seconds before pumping or handling your  milk.  Re-wash your hands if you touch anything (scratching your nose, answering the phone, etc) while pumping or handling your milk.   5. Before pumping your breasts, assemble the pump collection kit and have ready the sterile container and labels.  Place these items on a clean surface next to the breastpump.  6. Each time after you have finished pumping, take apart all of the parts of the breastpump collection kit and place them in a separate cleaning container (do not place them in the sink).  Be sure to remove the yellow valve from the breastshield and separate the white membrane from the yellow valve.  Rinse all of these parts with cool water.  Then use a new sponge and/or bottle brush and dishwashing detergent to clean the parts.  Rinse off the soapy water with cool water and air dry on a clean towel covered with a clean cloth.  All parts may also be washed after each use in the top rack of a .  7. Once each day, sterilize all of the parts of the breastpump collection kit.  This can be done by boiling the kit parts for 10 minutes or by using a Quick Clean Micro-Steam Bag made by [employer]  8. If condensation appears in the tubing, continue to run the pump with the tubing attached for 1-2 minutes or until the tubing is dry.   9. Notify your babys nurse or doctor if you become ill or need to take any medication, even over-the-counter medicines.        Collection and Storage of Expressed Breastmilk:         1. Pump your breasts at least 8-10 times every 24 hours.  Double pump (both breasts at  the same time) for at least 15-20 minutes using the most suction that is comfortable.    2. Write the date and time of pumping and the name of any medications you are takingon the babys pre-printed hospital identification label.   3. Place your babys pre-printed hospital identification label on each container of breastmilk.  Additional pre-printed labels can be obtained from your babys nurse.  If your  expressed breastmilk is not correctly labeled, the nurse cannot feed the milk to the baby.       4. Place a brightly colored sticker on the top of each container of milk pumped during the first 30 days.  This identifies the milk as special and having higher levels of nutrients and anti-infective properties that are so important for your baby.  Additional stickers can be obtained from the lactation consultants or your babys nurse.  5.   Do not touch the inside of the storage containers or lids.  6.      Pour the amount of expressed milk needed for 1 of your babys feedings into each   storage container. Use a new container(s) for each pumping.  Additional storage   containers can be obtained from your babys nurse.        7.       Tightly screw the lid onto the container and place immediately into the       refrigerator fordaily transportation to the hospital.   Do not freeze your milk      unless asked to do so by your babys nurse.  However, if you are not able to      visit your baby each day, place the expressed breastmilk in the freezer.  8.   Expressed breastmilk should be refrigerated or frozen within 1 hour of      pumping.  9.      Do not store expressed breastmilk on the door of your refrigerator or freezer where the temperature is warmer.         Transportation of Expressed Breastmilk:    1. Refrigerated breastmilk or frozen milk should be packed tightly together in a cooler with frozen, blue gel-packs to keep the milk frozen.  DO NOT USE ICE CUBES (WET ICE) TO TRANSPORT FROZEN MILK.   A clean towel can be used to fill any extra space between containers of frozen milk.  2.    Bring your expressed milk from home each time you visit the baby.

## 2017-04-14 NOTE — PROGRESS NOTES
PostPartum Progress Note        Subjective:      Post-Operative Day #3 after  delivery secondary to breech.    Patient is without complaints. Lochia decreasing. Both breast and bottle feeding. Pain is well controlled. Patient is ambulating. Tolerating Full Regular diet.Overall mother and baby are doing well.     Objective:      Temp:  [98.2 °F (36.8 °C)] 98.2 °F (36.8 °C)  Pulse:  [88-92] 88  Resp:  [18] 18  SpO2:  [99 %] 99 %  BP: (101-118)/(63-67) 101/63  No intake or output data in the 24 hours ending 17 0849  Body mass index is 33.67 kg/(m^2).    General: no acute distress  Abdomen: soft, non-tender, non-distended; Fundus firm and below the umbilicus  Incision- intact, healing well, no sign of infection  Extremities: non-tender, symmetric, 1+ edema    Group & Rh   Date Value Ref Range Status   2017 A POS  Corrected     Recent Results (from the past 336 hour(s))   CBC auto differential    Collection Time: 17  9:00 PM   Result Value Ref Range    WBC 7.78 3.90 - 12.70 K/uL    Hemoglobin 8.9 (L) 12.0 - 16.0 g/dL    Hematocrit 28.0 (L) 37.0 - 48.5 %    Platelets 129 (L) 150 - 350 K/uL   CBC auto differential    Collection Time: 17 11:00 AM   Result Value Ref Range    WBC 7.08 3.90 - 12.70 K/uL    Hemoglobin 10.2 (L) 12.0 - 16.0 g/dL    Hematocrit 32.2 (L) 37.0 - 48.5 %    Platelets 152 150 - 350 K/uL          Assessment:     27 y.o.  S/P  Delivery Post-Operative Day #3  - Doing Well      Plan:     1. Continue routine postpartum care  2. Plan for D/C today, discharge instructions reviewed.

## 2017-04-14 NOTE — PLAN OF CARE
Problem: Patient Care Overview  Goal: Individualization & Mutuality  Outcome: Ongoing (interventions implemented as appropriate)  Patient received after visit summary and verbalized understanding of discharge instructions. Patient escorted to vehicle in wheelchair with infant in arms.

## 2017-04-14 NOTE — PLAN OF CARE
Problem: Patient Care Overview  Goal: Plan of Care Review  Outcome: Ongoing (interventions implemented as appropriate)  Lactation note:  Reviewed lactation discharge teaching with patient and spouse using the breastfeeding guide. The patient was able to latch her infant to the breast independently once shown how to position infant and infant was nursing effectively with breast compression. Encouraged nursing infant 8 or more times in 24 hours on cue until content. Mom to pump/use hand expression after nursing for extra stimulation due to history of breast augmentation and infant requiring supplementation after nursing. We discussed prevention and treatment of sore nipples and breast engorgement. The patient has lanolin to use as needed after nursing. The patient was taught how to perform hand expression and she has a breast pump at home. The patient has the lactation phone number to call as needed. Additional resources were placed on her AVS to be given at discharge.

## 2017-04-14 NOTE — PLAN OF CARE
Problem: Patient Care Overview  Goal: Plan of Care Review  Outcome: Ongoing (interventions implemented as appropriate)  Lactation note:  Reviewed basics of breastfeeding with patient. Infant nursed well overnight but sleepier during the day. Encouraged mom to nurse infant 8 or more times in 24 hours on cue until content; if infant unable to latch then mom will double pump with symphony breast pump and give infant any expressed breast milk/formula by spoon until content. Sterilized pump parts today with father of baby. Offered assistance with breastfeeding; mom has no questions.  phone number on board for mom to call as needed.

## 2017-04-14 NOTE — DISCHARGE SUMMARY
Delivery Discharge Summary  Obstetrics      Primary OB Clinician: Robert Shields MD    Discharge Provider: Carmen Oviedo MD    Admission date: 2017  Discharge date: 2017    Admit Dx: Breech at term   Discharge Dx:    Patient Active Problem List   Diagnosis    Herpes simplex infection of genitourinary system    Breech birth     delivery delivered       Procedure: , due to breech    Hospital Course:  Leydi Matias is a 27 y.o. now  who was admitted on 2017 for delivery. Patient delivered a viable . Please see delivery note for further details. Pt was in stable condition post delivery and was transferred to the Mother-Baby Unit. Her postpartum course was uncomplicated. On the date of discharge, patient's pain is controlled with oral pain medications. She is tolerating ambulation without SOB or CP, and PO diet without N/V. Reported lochia is within the normal range. Pt in stable condition and ready for discharge.     Pertinent studies:  Postpartum CBC   Lab Results   Component Value Date    WBC 7.78 2017    HGB 8.9 (L) 2017    HCT 28.0 (L) 2017    MCV 76 (L) 2017     (L) 2017       Delivery:    Episiotomy: None   Lacerations: None   Repair suture:     Repair # of packets:     Blood loss (ml): 0     Birth information:  YOB: 2019   Time of birth: 12:43 PM   Sex: female   Delivery type: , Low Transverse   Gestational Age: 39w0d    Delivery Clinician:      Other providers:       Additional  information:  Forceps:    Vacuum:    Breech:    Observed anomalies      Living?:           APGARS  One minute Five minutes Ten minutes   Skin color:         Heart rate:         Grimace:         Muscle tone:         Breathing:         Totals: 8  9        Placenta: Delivered:       appearance      Disposition: To home, self care    Follow Up:   Follow-up Information     Follow up with Robert Shields MD In 2 weeks.    Specialties:   Obstetrics, Gynecology, Obstetrics and Gynecology    Why:  For wound re-check    Contact information:    [address]  [address]  [facility] [id number] [phone]          Follow up with Robert Shields MD In 6 weeks.    Specialties:  Obstetrics, Gynecology, Obstetrics and Gynecology    Why:  Postpartum    Contact information:    [address]  [address]  [facility] [id number] [phone]              Patient Instructions:   1. Call the office for any bleeding >2 pads/hour for >2 hours, temperature >100.4, pain that is uncontrolled with medications, or for any other concerns.  2. Pelvic rest and no tub baths x 6 weeks.  3. No driving while on narcotics.    Current Discharge Medication List      START taking these medications    Details   docusate sodium (COLACE) 100 MG capsule Take 2 capsules (200 mg total) by mouth 2 (two) times daily.  Refills: 0      ibuprofen (ADVIL,MOTRIN) 800 MG tablet Take 1 tablet (800 mg total) by mouth every 8 (eight) hours.  Qty: 30 tablet, Refills: 1      oxycodone-acetaminophen (PERCOCET) 5-325 mg per tablet Take 1 tablet by mouth every 4 (four) hours as needed.  Qty: 30 tablet, Refills: 0         CONTINUE these medications which have NOT CHANGED    Details   PRENATAL VIT/IRON FUMARATE/FA (PRENATAL 1+1 ORAL) Take by mouth.         STOP taking these medications       valacyclovir (VALTREX) 1000 MG tablet Comments:   Reason for Stopping:               Carmen Oviedo

## 2017-04-14 NOTE — PLAN OF CARE
Problem: Patient Care Overview  Goal: Plan of Care Review  Outcome: Ongoing (interventions implemented as appropriate)  Lactation note:  Reviewed lactation discharge teaching with patient and spouse using the breastfeeding guide. The patient was able to latch her infant to the breast with some assistance and infant was nursing effectively with breast compression. Encouraged nursing infant 8 or more times in 24 hours on cue until content. We discussed prevention and treatment of sore nipples and breast engorgement. The patient has lanolin to use as needed after nursing. The patient may need to pump after feedings, if infant still hungry or if infant does not nurse to provide breast milk by cup/spoon.The patient was taught how to perform hand expression and she has a breast pump at home to use as needed. The patient has the lactation phone number to call as needed. Additional resources were placed on her AVS to be given at discharge.

## 2017-04-14 NOTE — LACTATION NOTE
"   04/14/17 0845   Maternal Infant Assessment   Breast Shape Left:;round   Breast Density Left:;soft   Areola Left:;elastic   Nipple(s) Left:;graspable  (semi flat)   Infant Assessment   Sucking Reflex present   Rooting Reflex present   Swallow Reflex present   LATCH Score   Latch 1-->repeated attempts, holds nipple in mouth, stimulate to suck   Audible Swallowing 1-->a few with stimulation   Type Of Nipple 2-->everted (after stimulation)   Comfort (Breast/Nipple) 2-->soft/nontender   Hold (Positioning) 1-->minimal assist, teach one side: mother does other, staff holds   Score (less than 7 for 2/more consecutive times, consult Lactation Consultant) 7       Number Scale   Presence of Pain denies   Location - Side Left   Location nipple(s)   Maternal Infant Feeding   Maternal Emotional State assist needed   Infant Positioning clutch/"football"   Presence of Pain no   Time Spent (min) 15-30 min   Latch Assistance yes   Engorgement Measures complete emptying encouraged;supportive bra encouraged   Breastfeeding Education adequate infant intake;adequate milk volume;diet;importance of skin-to-skin contact;increasing milk supply;label/storage of breast milk;medication effects;milk expression, electric pump;milk expression, hand;prenatal vitamins continued   Infant First Feeding   Skin-to-Skin Contact Initiated   Feeding Infant   Feeding Readiness Cues hand to mouth movements;rooting   Satiety Cues sleeping after feeding   Feeding Tolerance/Success adequate pause for breath;alert for feeding   Effective Latch During Feeding yes   Skin-to-Skin Contact During Feeding yes   Lactation Referrals   Lactation Consult Breastfeeding assessment;Follow up   Lactation Interventions   Attachment Promotion breastfeeding assistance provided;counseling provided;skin-to-skin contact encouraged   Breastfeeding Assistance assisted with positioning;feeding cue recognition promoted;infant latch-on verified;infant suck/swallow verified;milk " expression/pumping   Maternal Breastfeeding Support diary/feeding log utilized;encouragement offered;lactation counseling provided;maternal hydration promoted;maternal nutrition promoted;maternal rest encouraged   Latch Promotion positioning assisted;infant moved to breast

## 2017-04-17 ENCOUNTER — TELEPHONE (OUTPATIENT)
Dept: LACTATION | Facility: CLINIC | Age: 27
End: 2017-04-17

## 2017-04-17 NOTE — TELEPHONE ENCOUNTER
Lactation note:  Called patient to follow up on breastfeeding progress. Unable to contact by phone; left message on voicemail with lactation phone number.

## 2017-04-18 ENCOUNTER — TELEPHONE (OUTPATIENT)
Dept: LACTATION | Facility: CLINIC | Age: 27
End: 2017-04-18

## 2017-04-18 NOTE — TELEPHONE ENCOUNTER
Lactation note:  Called patient to check on progress with breastfeeding. Unable to contact patient at this time; left message on voicemail with lactation phone number.

## 2017-04-27 ENCOUNTER — POSTPARTUM VISIT (OUTPATIENT)
Dept: OBSTETRICS AND GYNECOLOGY | Facility: CLINIC | Age: 27
End: 2017-04-27
Payer: COMMERCIAL

## 2017-04-27 VITALS
WEIGHT: 190.06 LBS | SYSTOLIC BLOOD PRESSURE: 114 MMHG | DIASTOLIC BLOOD PRESSURE: 80 MMHG | HEIGHT: 67 IN | BODY MASS INDEX: 29.83 KG/M2

## 2017-04-27 DIAGNOSIS — Z48.89 POSTOPERATIVE VISIT: Primary | ICD-10-CM

## 2017-04-27 PROCEDURE — 99999 PR PBB SHADOW E&M-EST. PATIENT-LVL II: CPT | Mod: PBBFAC,,, | Performed by: OBSTETRICS & GYNECOLOGY

## 2017-04-27 PROCEDURE — 99024 POSTOP FOLLOW-UP VISIT: CPT | Mod: S$GLB,,, | Performed by: OBSTETRICS & GYNECOLOGY

## 2017-04-27 NOTE — MR AVS SNAPSHOT
[facility]  [address]  [place] [address]  Phone: [phone]  Fax: [phone]                  Leydi Matias   2017 9:15 AM   Postpartum Visit    Description:  Female : 1990   Provider:  Robert Shields MD   Department:  [facility]           Reason for Visit     Postpartum Care           Diagnoses this Visit        Comments    Postoperative visit    -  Primary            To Do List           Future Appointments        Provider Department Dept Phone    2017 10:15 AM Robert Shields MD [facility] [phone]      Goals (5 Years of Data)     None      Follow-Up and Disposition     Return in about 4 weeks (around 2017).    Follow-up and Disposition History      [facility]     [facility] On [facility] Nurse Care Line -  Assistance  Unless otherwise directed by your provider, please contact [facility], our nurse care line that is available for  assistance.     Registered nurses in the [facility] Call Center provide: appointment scheduling, clinical advisement, health education, and other advisory services.  Call: [phone] (toll free)               Medications           Message regarding Medications     Verify the changes and/or additions to your medication regime listed below are the same as discussed with your clinician today.  If any of these changes or additions are incorrect, please notify your healthcare provider.        STOP taking these medications     docusate sodium (COLACE) 100 MG capsule Take 2 capsules (200 mg total) by mouth 2 (two) times daily.    oxycodone-acetaminophen (PERCOCET) 5-325 mg per tablet Take 1 tablet by mouth every 4 (four) hours as needed.           Verify that the below list of medications is an accurate representation of the medications you are currently taking.  If none reported, the list may be blank. If incorrect, please contact your healthcare provider. Carry this list with you in  "case of emergency.           Current Medications     PRENATAL VIT/IRON FUMARATE/FA (PRENATAL 1+1 ORAL) Take by mouth.    ibuprofen (ADVIL,MOTRIN) 800 MG tablet Take 1 tablet (800 mg total) by mouth every 8 (eight) hours.           Clinical Reference Information           Prenatal Vitals     Enc. Date GA Prenatal Vitals Prenatal Pulse Pain Level Urine Albumin/Glucose Edema Presentation Dilation/Effacement/Station    4/27/17 39w0d 114/80 / 86.2 kg (190 lb 0.6 oz)           4/11/17 39w0d Admission Dept: [facility]    4/6/17 38w2d 110/78 / 97.3 kg (214 lb 8.1 oz) 38 cm / 155 / Present  1 Negative / Negative +2 / +2 / None / No  0 / 30    3/30/17 37w2d 128/82 / 98.2 kg (216 lb 9.6 oz)  /  / Present  0 Negative / Negative +2 / +2 / None / No      3/23/17 36w2d 122/74 / 98 kg (216 lb 0.8 oz) 38 cm / 149 / Present   Negative / Negative +2 / +2 / None / No  0 / 30 / -4    3/7/17 34w0d 106/78 / 97.4 kg (214 lb 13.4 oz)  /  / Present  4 Negative / Negative +1 / +1 / None / No      2/23/17 32w2d 126/76 / 96 kg (211 lb 10.3 oz) 32 cm / 144 / Present  0 Negative / Negative +1 / +1 / None / No      2/9/17 30w2d 112/60 / 95.8 kg (211 lb 3.2 oz) 31 cm / An 33 / Present  0 Negative / Negative None / None / None / No      1/25/17 28w1d 108/62 / 93.9 kg (207 lb 2 oz)              Number of babies: 1   Height: 5' 7" (1.702 m)       Your Vitals Were     BP Height Weight Last Period BMI    114/80 5' 7" (1.702 m) 86.2 kg (190 lb 0.6 oz) 07/12/2016 (Exact Date) 29.76 kg/m2      Allergies as of 4/27/2017     No Known Allergies      Immunizations Administered on Date of Encounter - 4/27/2017     None      Language Assistance Services     ATTENTION: Language assistance services are available, free of charge. Please call [phone].      ATENCIÓN: Si habla español, tiene a paige disposición servicios gratuitos de asistencia lingüística. Llame al [phone].     CHÚ Ý: N?u b?n nói Ti?ng Vi?t, có các d?ch v? h? tr? ngôn ng? mi?n phí dành " danis crowley?blaise CHANEL?i s? [phone].         [facility]'s [facility] complies with applicable Federal civil rights laws and does not discriminate on the basis of race, color, national origin, age, disability, or sex.

## 2017-04-27 NOTE — PROGRESS NOTES
Subjective:       Patient ID: Leydi Matias is a 27 y.o. female.    Chief Complaint:  Postpartum Care (2w 2d PP C/S, Delivered on 17)      History of Present Illness  A 7-year-old  1 para 1 status post low transverse  section for breech      GYN & OB History  Patient's last menstrual period was 2016 (exact date).   Date of Last Pap: No result found    OB History    Para Term  AB SAB TAB Ectopic Multiple Living   1 1 1 0 0 0 0 0 0 1      # Outcome Date GA Lbr Rocco/2nd Weight Sex Delivery Anes PTL Lv   1 Term 17 39w0d  3.56 kg (7 lb 13.6 oz) F CS-LTranv Spinal N Y      Complications: Breech presentation          Review of Systems  Review of Systems   Constitutional: Negative for fatigue and unexpected weight change.   Respiratory: Negative for shortness of breath.    Cardiovascular: Negative for chest pain.   Gastrointestinal: Negative for abdominal pain, constipation, diarrhea, nausea and vomiting.   Genitourinary: Negative for dysuria.   Musculoskeletal: Negative for back pain.   Skin: Negative for rash.   Neurological: Negative for headaches.   Hematological: Does not bruise/bleed easily.   Psychiatric/Behavioral: Negative for behavioral problems.        Objective:   Physical Exam:   Constitutional: She is oriented to person, place, and time. She appears well-developed and well-nourished.             Abdominal: Soft. There is no tenderness.   Incision clear dry and intact                 Neurological: She is alert and oriented to person, place, and time.    Skin: Skin is warm.    Psychiatric: She has a normal mood and affect.        Assessment/ Plan:          Leydi was seen today for postpartum care.    Diagnoses and all orders for this visit:    Postoperative visit   Normal 2 week  incision check.   Recommend follow-up in 4 weeks.   No signs or symptoms of postpartum depression.      Return in about 4 weeks (around 2017).      Health Maintenance        Date Due Completion Date    Lipid Panel 1990 ---    Pap Smear 1/26/2009 ---    Influenza Vaccine 8/1/2016 ---    TETANUS VACCINE 3/7/2027 3/7/2017

## 2017-05-25 ENCOUNTER — POSTPARTUM VISIT (OUTPATIENT)
Dept: OBSTETRICS AND GYNECOLOGY | Facility: CLINIC | Age: 27
End: 2017-05-25
Payer: COMMERCIAL

## 2017-05-25 VITALS — WEIGHT: 189.63 LBS | BODY MASS INDEX: 29.76 KG/M2 | HEIGHT: 67 IN

## 2017-05-25 DIAGNOSIS — Z30.9 ENCOUNTER FOR CONTRACEPTIVE MANAGEMENT, UNSPECIFIED TYPE: ICD-10-CM

## 2017-05-25 PROCEDURE — 0503F POSTPARTUM CARE VISIT: CPT | Mod: S$GLB,,, | Performed by: OBSTETRICS & GYNECOLOGY

## 2017-05-25 PROCEDURE — 99999 PR PBB SHADOW E&M-EST. PATIENT-LVL II: CPT | Mod: PBBFAC,,, | Performed by: OBSTETRICS & GYNECOLOGY

## 2017-05-25 RX ORDER — NORETHINDRONE ACETATE AND ETHINYL ESTRADIOL, AND FERROUS FUMARATE 1MG-20(24)
20 KIT ORAL DAILY
Qty: 84 CAPSULE | Refills: 3 | Status: SHIPPED | OUTPATIENT
Start: 2017-05-25 | End: 2017-07-14 | Stop reason: CLARIF

## 2017-05-25 NOTE — PROGRESS NOTES
Subjective:       Patient ID: Leydi Matias is a 27 y.o. female.    Chief Complaint:  Postpartum Care (6 wks out c-sec, bottlefeeding. C/o emotional sometimes)      History of Present Illness  27-year-old here for postpatrum visit  Patient doing well.  Desires to try birth control pills.  Has tried Ortho Tri-Cyclen in the past and did not do well.  Patient desires to try a different brand.  Declines IUD option  No signs or symptoms of postpartum depression.   Her and her  are both living with her parents for now.  Occasionally feels overwhelmed with a new baby at home.    GYN & OB History  Patient's last menstrual period was 2016 (exact date).   Date of Last Pap: No result found    OB History    Para Term  AB Living   1 1 1 0 0 1   SAB TAB Ectopic Multiple Live Births   0 0 0 0 1      # Outcome Date GA Lbr Rocco/2nd Weight Sex Delivery Anes PTL Lv   1 Term 17 39w0d  3.56 kg (7 lb 13.6 oz) F CS-LTranv Spinal N DEBRA      Complications: Breech presentation          Review of Systems  Review of Systems   Constitutional: Negative for fatigue and unexpected weight change.   Respiratory: Negative for shortness of breath.    Cardiovascular: Negative for chest pain.   Gastrointestinal: Negative for abdominal pain, constipation, diarrhea, nausea and vomiting.   Genitourinary: Negative for dysuria.   Musculoskeletal: Negative for back pain.   Skin: Negative for rash.   Neurological: Negative for headaches.   Hematological: Does not bruise/bleed easily.   Psychiatric/Behavioral: Negative for behavioral problems.        Objective:   Physical Exam:   Constitutional: She is oriented to person, place, and time. She appears well-developed and well-nourished.             Abdominal: Soft. She exhibits abdominal incision. She exhibits no mass. There is no tenderness.     Genitourinary: Vagina normal and uterus normal. No vaginal discharge found.           Musculoskeletal: Normal range of motion.        Neurological: She is alert and oriented to person, place, and time.    Skin: Skin is warm.     incision clear dry and intact     Assessment/ Plan:          Leydi was seen today for postpartum care.    Diagnoses and all orders for this visit:    Encounter for postpartum visit    Encounter for contraceptive management, unspecified type  -     norethindrone-e.estradiol-iron (TAYTULLA) 1 mg-20 mcg (24)/75 mg (4) Cap; Take 20 mcg by mouth once daily.        Return in about 4 months (around 9/25/2017). for annual exam.      Health Maintenance       Date Due Completion Date    Lipid Panel 1990 ---    Pap Smear 01/26/2009 ---    Influenza Vaccine 08/01/2017 ---    TETANUS VACCINE 03/07/2027 3/7/2017

## 2017-06-14 ENCOUNTER — TELEPHONE (OUTPATIENT)
Dept: OBSTETRICS AND GYNECOLOGY | Facility: CLINIC | Age: 27
End: 2017-06-14

## 2017-06-14 NOTE — TELEPHONE ENCOUNTER
Pt needs note saying she can return to work June 25th. Pt will come pick it up at the [place] office, please call her when it is ready.

## 2017-07-14 DIAGNOSIS — Z30.9 ENCOUNTER FOR CONTRACEPTIVE MANAGEMENT, UNSPECIFIED TYPE: Primary | ICD-10-CM

## 2017-07-14 RX ORDER — NORGESTIMATE AND ETHINYL ESTRADIOL 7DAYSX3 LO
1 KIT ORAL DAILY
Qty: 28 TABLET | Refills: 6 | Status: SHIPPED | OUTPATIENT
Start: 2017-07-14 | End: 2018-04-12

## 2018-04-12 ENCOUNTER — OFFICE VISIT (OUTPATIENT)
Dept: OBSTETRICS AND GYNECOLOGY | Facility: CLINIC | Age: 28
End: 2018-04-12
Payer: COMMERCIAL

## 2018-04-12 VITALS
HEIGHT: 67 IN | DIASTOLIC BLOOD PRESSURE: 70 MMHG | WEIGHT: 201.25 LBS | SYSTOLIC BLOOD PRESSURE: 118 MMHG | BODY MASS INDEX: 31.59 KG/M2

## 2018-04-12 DIAGNOSIS — N92.6 MISSED MENSES: Primary | ICD-10-CM

## 2018-04-12 DIAGNOSIS — Z32.01 POSITIVE URINE PREGNANCY TEST: ICD-10-CM

## 2018-04-12 LAB
B-HCG UR QL: POSITIVE
CTP QC/QA: YES

## 2018-04-12 PROCEDURE — 81025 URINE PREGNANCY TEST: CPT | Mod: S$GLB,,, | Performed by: NURSE PRACTITIONER

## 2018-04-12 PROCEDURE — 99999 PR PBB SHADOW E&M-EST. PATIENT-LVL II: CPT | Mod: PBBFAC,,, | Performed by: NURSE PRACTITIONER

## 2018-04-12 PROCEDURE — 99214 OFFICE O/P EST MOD 30 MIN: CPT | Mod: SA,S$GLB,, | Performed by: NURSE PRACTITIONER

## 2018-04-12 NOTE — PROGRESS NOTES
"CC: Absence of menses    (Dr. Shields patient)  Patient's last menstrual period was 2018.,   EDC: 2018,   GA:  6w4d      Leydi Matias is a 28 y.o. female  presents with complaint of absence of menstruation.  She denies nausea/vomiting, bleeding, or abdominal pain.  Previous c/s due to breech presentation.    UPT is: positive.     Last Pap:  No result found Normal,  HPV not done    Past Medical History:   Diagnosis Date    Acid reflux      Past Surgical History:   Procedure Laterality Date    BREAST SURGERY      AUGMENTATION     SECTION  2017    TONSILLECTOMY       Social History   Substance Use Topics    Smoking status: Never Smoker    Smokeless tobacco: Never Used    Alcohol use No     Family History   Problem Relation Age of Onset    Breast cancer Mother 45    Hyperlipidemia Mother     Cancer Mother     No Known Problems Daughter     Colon cancer Neg Hx     Ovarian cancer Neg Hx      OB History    Para Term  AB Living   1 1 1 0 0 1   SAB TAB Ectopic Multiple Live Births   0 0 0 0 1      # Outcome Date GA Lbr Rocco/2nd Weight Sex Delivery Anes PTL Lv   1 Term 17 39w0d  3.56 kg (7 lb 13.6 oz) F CS-LTranv Spinal N DEBRA      Complications: Breech presentation          /70   Ht 5' 7" (1.702 m)   Wt 91.3 kg (201 lb 4.5 oz)   LMP 2018   Breastfeeding? No   BMI 31.52 kg/m²     ROS:  GENERAL: Denies weight gain or weight loss. Feeling well overall.   SKIN: Denies rash or lesions.   HEAD: Denies head injury, headache, or vision changes.   NODES: Denies enlarged lymph nodes.  HEMATOLOGIC: No easy bruisability or excessive bleeding.   MUSCULOSKELETAL: Denies joint pain or swelling.    CARDIOVASCULAR: No chest pain. No shortness of breath. No leg cramps.  NEUROLOGICAL: No headaches. No vision changes.  PSYCHIATRIC: Denies depression, anxiety or mood swings.    VULVOVAGINAL: denies itching, denies abnormal bleeding and denies lesions.  ABDOMEN: " denies abdominal pain. no nausea/no vomiting  Denies nausea. Denies vomiting. No diarrhea. No constipation  URINARY: No incontinence, no nocturia, no frequency and no dysuria.  BREASTS: The patient performs breast self-examination and denies pain, lumps, or nipple discharge.       PE:   APPEARANCE: Well nourished, well developed, in no acute distress.  AFFECT: WNL, alert and oriented x 3.  NECK: Neck symmetric without masses or thyromegaly.   CHEST: Good respiratory effort.     ASSESSMENT and PLAN:  Missed menses  -     POCT urine pregnancy  -     US OB/GYN Procedure ([facility]) - Extended List; Future    Positive urine pregnancy test          *  New OB packet reviewed at length and copy given to patient.  Zika precautions given as well.  Patient was counseled today on proper weight gain based on the [employer]'s recommendations based on her pre-pregnancy weight. Discussed foods to avoid in pregnancy (i.e. sushi, fish that are high in mercury, deli meat, and unpasteurized cheeses). Discussed prenatal vitamin options (i.e. stool softener, DHA). Optional genetic testing discussed.    *  Connected Moms-- discussed/education provided with handout. Patient is unsure if interested.        Follow-up in about 2 weeks (around 4/26/2018) for F/U with physician for Initial OB visit & U/S.    ~25 minutes spent with pt Face to Face with >50% of visit spent on education/counseling.

## 2018-04-20 ENCOUNTER — TELEPHONE (OUTPATIENT)
Dept: OBSTETRICS AND GYNECOLOGY | Facility: CLINIC | Age: 28
End: 2018-04-20

## 2018-04-20 NOTE — TELEPHONE ENCOUNTER
OB pt started PNV Saturday and since then has been having intestinal cramping and having daily diarrhea.  She is taking an OTC PNV.  Recommended she stop PNV and start with folic acid supplement.

## 2018-04-20 NOTE — TELEPHONE ENCOUNTER
Bone pt is having stomach cramping, and diarrhea for the past week, pt thinks it started when she started taking the prenatals, but pt not sure if it is coincidence or not.    [phone]

## 2018-04-20 NOTE — TELEPHONE ENCOUNTER
Could be the iron in the PNV but could also be a stomach virus. Keep a watch on it and see how she does over the weekend and let us know next week.

## 2018-04-26 ENCOUNTER — LAB VISIT (OUTPATIENT)
Dept: LAB | Facility: HOSPITAL | Age: 28
End: 2018-04-26
Attending: OBSTETRICS & GYNECOLOGY
Payer: COMMERCIAL

## 2018-04-26 ENCOUNTER — ROUTINE PRENATAL (OUTPATIENT)
Dept: OBSTETRICS AND GYNECOLOGY | Facility: CLINIC | Age: 28
End: 2018-04-26
Payer: COMMERCIAL

## 2018-04-26 ENCOUNTER — PROCEDURE VISIT (OUTPATIENT)
Dept: OBSTETRICS AND GYNECOLOGY | Facility: CLINIC | Age: 28
End: 2018-04-26
Payer: COMMERCIAL

## 2018-04-26 VITALS
WEIGHT: 200.38 LBS | SYSTOLIC BLOOD PRESSURE: 110 MMHG | DIASTOLIC BLOOD PRESSURE: 62 MMHG | BODY MASS INDEX: 31.39 KG/M2

## 2018-04-26 DIAGNOSIS — Z3A.09 9 WEEKS GESTATION OF PREGNANCY: Primary | ICD-10-CM

## 2018-04-26 DIAGNOSIS — Z3A.09 9 WEEKS GESTATION OF PREGNANCY: ICD-10-CM

## 2018-04-26 DIAGNOSIS — Z12.4 ENCOUNTER FOR PAPANICOLAOU SMEAR FOR CERVICAL CANCER SCREENING: ICD-10-CM

## 2018-04-26 DIAGNOSIS — Z36.89 ESTABLISH GESTATIONAL AGE, ULTRASOUND: ICD-10-CM

## 2018-04-26 DIAGNOSIS — O36.80X0 ENCOUNTER TO DETERMINE FETAL VIABILITY OF PREGNANCY, SINGLE OR UNSPECIFIED FETUS: ICD-10-CM

## 2018-04-26 DIAGNOSIS — Z34.81 ENCOUNTER FOR SUPERVISION OF OTHER NORMAL PREGNANCY, FIRST TRIMESTER: ICD-10-CM

## 2018-04-26 DIAGNOSIS — N92.6 MISSED MENSES: ICD-10-CM

## 2018-04-26 DIAGNOSIS — Z11.51 SCREENING FOR HPV (HUMAN PAPILLOMAVIRUS): ICD-10-CM

## 2018-04-26 LAB
ABO + RH BLD: NORMAL
BASOPHILS # BLD AUTO: 0.02 K/UL
BASOPHILS NFR BLD: 0.3 %
BLD GP AB SCN CELLS X3 SERPL QL: NORMAL
DIFFERENTIAL METHOD: ABNORMAL
EOSINOPHIL # BLD AUTO: 0.1 K/UL
EOSINOPHIL NFR BLD: 2 %
ERYTHROCYTE [DISTWIDTH] IN BLOOD BY AUTOMATED COUNT: 15.7 %
GLUCOSE SERPL-MCNC: 70 MG/DL
HCT VFR BLD AUTO: 35.6 %
HGB BLD-MCNC: 10.9 G/DL
IMM GRANULOCYTES # BLD AUTO: 0.02 K/UL
IMM GRANULOCYTES NFR BLD AUTO: 0.3 %
LYMPHOCYTES # BLD AUTO: 1.5 K/UL
LYMPHOCYTES NFR BLD: 24.1 %
MCH RBC QN AUTO: 24.6 PG
MCHC RBC AUTO-ENTMCNC: 30.6 G/DL
MCV RBC AUTO: 80 FL
MONOCYTES # BLD AUTO: 0.2 K/UL
MONOCYTES NFR BLD: 3.8 %
NEUTROPHILS # BLD AUTO: 4.2 K/UL
NEUTROPHILS NFR BLD: 69.5 %
NRBC BLD-RTO: 0 /100 WBC
PLATELET # BLD AUTO: 200 K/UL
PMV BLD AUTO: 11.5 FL
RBC # BLD AUTO: 4.43 M/UL
TSH SERPL DL<=0.005 MIU/L-ACNC: 0.82 UIU/ML
WBC # BLD AUTO: 6.1 K/UL

## 2018-04-26 PROCEDURE — 86850 RBC ANTIBODY SCREEN: CPT

## 2018-04-26 PROCEDURE — 86703 HIV-1/HIV-2 1 RESULT ANTBDY: CPT

## 2018-04-26 PROCEDURE — 87086 URINE CULTURE/COLONY COUNT: CPT

## 2018-04-26 PROCEDURE — 76817 TRANSVAGINAL US OBSTETRIC: CPT | Mod: S$GLB,,, | Performed by: OBSTETRICS & GYNECOLOGY

## 2018-04-26 PROCEDURE — 85025 COMPLETE CBC W/AUTO DIFF WBC: CPT

## 2018-04-26 PROCEDURE — 99999 PR PBB SHADOW E&M-EST. PATIENT-LVL III: CPT | Mod: PBBFAC,,, | Performed by: NURSE PRACTITIONER

## 2018-04-26 PROCEDURE — 86592 SYPHILIS TEST NON-TREP QUAL: CPT

## 2018-04-26 PROCEDURE — 87340 HEPATITIS B SURFACE AG IA: CPT

## 2018-04-26 PROCEDURE — 86762 RUBELLA ANTIBODY: CPT

## 2018-04-26 PROCEDURE — 0500F INITIAL PRENATAL CARE VISIT: CPT | Mod: S$GLB,,, | Performed by: NURSE PRACTITIONER

## 2018-04-26 PROCEDURE — 88175 CYTOPATH C/V AUTO FLUID REDO: CPT

## 2018-04-26 PROCEDURE — 87624 HPV HI-RISK TYP POOLED RSLT: CPT

## 2018-04-26 PROCEDURE — 84443 ASSAY THYROID STIM HORMONE: CPT

## 2018-04-26 PROCEDURE — 82947 ASSAY GLUCOSE BLOOD QUANT: CPT

## 2018-04-26 NOTE — PROGRESS NOTES
Here for initial OB visit and u/s was performed for dating as well.  EDC revised based upon CRL:  9w 3d today (EDC: 11/26/18)  Pap/HPV collected today; cervix closed; vagina without lesions or abnormal discharge; external genitalia without lesions.  No adnexal tenderness or masses/fullness noted.  Initial OB labs collected today.  Only c/o is she experienced diarrhea when taking PNV; diarrhea stopped when she stopped PNV.  She even tried a different brand PNV a week or so later, and again had diarrhea.  Since then, she has just been taking Folic Acid supplements.    Bleeding/cramping prec given.

## 2018-04-26 NOTE — PROCEDURES
Procedures   Obstetrical ultrasound completed today.  See report in imaging section of [facility].

## 2018-04-27 LAB
HBV SURFACE AG SERPL QL IA: NEGATIVE
HIV 1+2 AB+HIV1 P24 AG SERPL QL IA: NEGATIVE
RPR SER QL: NORMAL
RUBV IGG SER-ACNC: 22.3 IU/ML
RUBV IGG SER-IMP: REACTIVE

## 2018-04-27 NOTE — PROGRESS NOTES
Leydi,    I am so excited for you!   Congratulations!!!   Your ultrasound looks great! Your due date is 11/26/18.  Take care and I will see you back in the office soon!!   Dr Shields

## 2018-04-28 LAB — BACTERIA UR CULT: NO GROWTH

## 2018-04-30 ENCOUNTER — TELEPHONE (OUTPATIENT)
Dept: OBSTETRICS AND GYNECOLOGY | Facility: CLINIC | Age: 28
End: 2018-04-30

## 2018-04-30 NOTE — TELEPHONE ENCOUNTER
10 week OB didn't feel well over the weekend.  She was fatigued and having body aches Saturday and Sunday.  She slept most of the weekend but still doesn't feel well today.  Tylenol did not help.  She worked long hours last week and asking if that could be the cause.  Recommended rest, hydration and to go to urgent care as body aches usually indicate fever.  Verbalized understanding.

## 2018-04-30 NOTE — TELEPHONE ENCOUNTER
Bone OB, 10 weeks and this weekend pt was lethargic and slept most of the weekend and had body aches. Pt took tylenol and is not as achey but it still feeling lethargic.

## 2018-05-02 LAB
HPV16 AG SPEC QL: NEGATIVE
HPV16+18+H RISK 12 DNA CVX-IMP: NEGATIVE
HPV18 DNA SPEC QL NAA+PROBE: NEGATIVE

## 2018-05-11 ENCOUNTER — TELEPHONE (OUTPATIENT)
Dept: OBSTETRICS AND GYNECOLOGY | Facility: CLINIC | Age: 28
End: 2018-05-11

## 2018-05-11 NOTE — TELEPHONE ENCOUNTER
Bone OB- 11w4d. Super Congested and would like to know what she can take. Advised pt to take over the counter Claritin or Zyrtec daily, and Mucinex q12, along with Flonase nightly. She did see her ENT today that rulled out an ear infection. He told her it sounded mostly viral and she would just have to ride it out because he was uncomfortable with giving her a steroid shot, which he stated was his usual go to for these symptoms. Pt to return call or go see ENT again if not better after a week.

## 2018-05-14 ENCOUNTER — TELEPHONE (OUTPATIENT)
Dept: OBSTETRICS AND GYNECOLOGY | Facility: CLINIC | Age: 28
End: 2018-05-14

## 2018-05-14 NOTE — TELEPHONE ENCOUNTER
Bone ob pt - pt is 12 weeks and said she is constantly coughing. She would like to know if there is something she can take so she can get some sleep.

## 2018-05-15 NOTE — TELEPHONE ENCOUNTER
"12 1/7 week c/o coughing.  She is not getting sleep due to the cough.  Recommended Claritin and Mucinex in the AM, Benadryl at night to help her sleep and Robitussin for the cough.  Verbalized understanding.  She didn't want to call ENT because he told her the "incorrect information" he told her she could take Afrin and was instructed by Kali to not take it.    "

## 2018-05-28 ENCOUNTER — ROUTINE PRENATAL (OUTPATIENT)
Dept: OBSTETRICS AND GYNECOLOGY | Facility: CLINIC | Age: 28
End: 2018-05-28
Payer: COMMERCIAL

## 2018-05-28 VITALS
DIASTOLIC BLOOD PRESSURE: 72 MMHG | BODY MASS INDEX: 31.08 KG/M2 | SYSTOLIC BLOOD PRESSURE: 112 MMHG | WEIGHT: 198.44 LBS

## 2018-05-28 DIAGNOSIS — Z3A.14 14 WEEKS GESTATION OF PREGNANCY: Primary | ICD-10-CM

## 2018-05-28 PROCEDURE — 99999 PR PBB SHADOW E&M-EST. PATIENT-LVL II: CPT | Mod: PBBFAC,,, | Performed by: OBSTETRICS & GYNECOLOGY

## 2018-05-28 PROCEDURE — 0502F SUBSEQUENT PRENATAL CARE: CPT | Mod: S$GLB,,, | Performed by: OBSTETRICS & GYNECOLOGY

## 2018-05-28 NOTE — PROGRESS NOTES
Declined mat 21 and quad screen Ordered MFM u/s  No c/o But not dawna PNV well Will try others- samples given

## 2018-06-01 ENCOUNTER — TELEPHONE (OUTPATIENT)
Dept: OBSTETRICS AND GYNECOLOGY | Facility: CLINIC | Age: 28
End: 2018-06-01

## 2018-06-01 NOTE — TELEPHONE ENCOUNTER
Dr Shields pt calling ob 14wks has a bad cough, sore throat and congestion wants to know what she should do.Pt  [phone]

## 2018-06-01 NOTE — TELEPHONE ENCOUNTER
14 4/7 week OB c/o congestion, cough, and sore throat.  Benadryl did not improve sleep last night.  Recommended Claritin and Mucinex daily, Benadryl at night, and Robitussin PRN.  Verbalized understanding.  She is worried about getting a steroid injection, advised it should be safe now but if she wanted to wait until next week and if she still isn't feeling well she can go to ENT for steroid injection.     Chief Complaint   Patient presents with     Knee Pain     /78 (BP Location: Left arm, Patient Position: Left side, Cuff Size: Adult Large)  Pulse 88  Temp 97.7  F (36.5  C) (Oral)  Ht 6' (1.829 m)  Wt 250 lb 3 oz (113.5 kg)  SpO2 96%  BMI 33.93 kg/m2 Estimated body mass index is 33.93 kg/(m^2) as calculated from the following:    Height as of this encounter: 6' (1.829 m).    Weight as of this encounter: 250 lb 3 oz (113.5 kg).  bp completed using cuff size: large      Health Maintenance addressed:  NONE    n/a

## 2018-06-07 ENCOUNTER — TELEPHONE (OUTPATIENT)
Dept: OBSTETRICS AND GYNECOLOGY | Facility: CLINIC | Age: 28
End: 2018-06-07

## 2018-06-07 RX ORDER — TERCONAZOLE 8 MG/G
1 CREAM VAGINAL NIGHTLY
Qty: 20 G | Refills: 0 | Status: SHIPPED | OUTPATIENT
Start: 2018-06-07 | End: 2018-06-28

## 2018-06-07 NOTE — TELEPHONE ENCOUNTER
D Bone pt calling, ob 14wk ewent to see her ENT and gave her some Rx's and wants to make sure these are ok to take.Pt # [phone]

## 2018-06-07 NOTE — TELEPHONE ENCOUNTER
15 3/7 week OB went to ENT for a productive cough with green mucous.  She was prescribed Augmentin, Diflucan in case she gets a yeast infection and phenergan with codeine.  He also recommended she use Afrin and take a decongestant.      Reassured her Augmentin and phenergan with codeine is safe to take    Advised Dr. Shields prefers Terazol for a yeast infection instead of Diflucan.  Recommended Flonase instead of Afrin and regular Claritin instead of Claritin D.      Terazol pended

## 2018-06-08 NOTE — TELEPHONE ENCOUNTER
Ale,   Tell her she can use the Afrin at night before going to sleep for severe nasal stuffiness but not on everyday every 4 hr basis

## 2018-06-13 ENCOUNTER — TELEPHONE (OUTPATIENT)
Dept: OBSTETRICS AND GYNECOLOGY | Facility: CLINIC | Age: 28
End: 2018-06-13

## 2018-06-13 NOTE — TELEPHONE ENCOUNTER
Bone pt-- Pt states she went to  monitor for connected mom but they didn't have any when she went. States they were suppose to have more today but if not will call to let us know. Also states she did well with Vitafol ultra, would like rx sent into pharm on file

## 2018-06-13 NOTE — TELEPHONE ENCOUNTER
Pt has a few questions about connected mom that she would like to speak to Heidi about. Also, she found a prenatal vitamin that works for her and wants to see if  can send it into the pharm for her. The name of the prenatal vitamin is Vitafol.

## 2018-06-16 ENCOUNTER — HOSPITAL ENCOUNTER (EMERGENCY)
Facility: HOSPITAL | Age: 28
Discharge: HOME | End: 2018-06-16
Attending: EMERGENCY MEDICINE
Payer: COMMERCIAL

## 2018-06-16 VITALS
TEMPERATURE: 98 F | BODY MASS INDEX: 30.61 KG/M2 | WEIGHT: 195 LBS | OXYGEN SATURATION: 97 % | RESPIRATION RATE: 16 BRPM | HEIGHT: 67 IN | DIASTOLIC BLOOD PRESSURE: 64 MMHG | HEART RATE: 72 BPM | SYSTOLIC BLOOD PRESSURE: 128 MMHG

## 2018-06-16 DIAGNOSIS — R23.3 PETECHIAE: ICD-10-CM

## 2018-06-16 DIAGNOSIS — R21 RASH: ICD-10-CM

## 2018-06-16 DIAGNOSIS — O21.0 MORNING SICKNESS: Primary | ICD-10-CM

## 2018-06-16 LAB
ALBUMIN SERPL BCP-MCNC: 3 G/DL
ALP SERPL-CCNC: 49 U/L
ALT SERPL W/O P-5'-P-CCNC: 12 U/L
ANION GAP SERPL CALC-SCNC: 10 MMOL/L
AST SERPL-CCNC: 18 U/L
BASOPHILS # BLD AUTO: 0.01 K/UL
BASOPHILS NFR BLD: 0.2 %
BILIRUB SERPL-MCNC: 0.5 MG/DL
BILIRUB UR QL STRIP: NEGATIVE
BUN SERPL-MCNC: 10 MG/DL
CALCIUM SERPL-MCNC: 8.3 MG/DL
CHLORIDE SERPL-SCNC: 108 MMOL/L
CLARITY UR REFRACT.AUTO: ABNORMAL
CO2 SERPL-SCNC: 19 MMOL/L
COLOR UR AUTO: YELLOW
CREAT SERPL-MCNC: 0.6 MG/DL
DIFFERENTIAL METHOD: ABNORMAL
EOSINOPHIL # BLD AUTO: 0 K/UL
EOSINOPHIL NFR BLD: 0.9 %
ERYTHROCYTE [DISTWIDTH] IN BLOOD BY AUTOMATED COUNT: 15.9 %
EST. GFR  (AFRICAN AMERICAN): >60 ML/MIN/1.73 M^2
EST. GFR  (NON AFRICAN AMERICAN): >60 ML/MIN/1.73 M^2
GLUCOSE SERPL-MCNC: 84 MG/DL
GLUCOSE UR QL STRIP: NEGATIVE
HCT VFR BLD AUTO: 35 %
HGB BLD-MCNC: 11 G/DL
HGB UR QL STRIP: NEGATIVE
IMM GRANULOCYTES # BLD AUTO: 0.01 K/UL
IMM GRANULOCYTES NFR BLD AUTO: 0.2 %
KETONES UR QL STRIP: ABNORMAL
LEUKOCYTE ESTERASE UR QL STRIP: NEGATIVE
LYMPHOCYTES # BLD AUTO: 0.6 K/UL
LYMPHOCYTES NFR BLD: 12.9 %
MCH RBC QN AUTO: 24.7 PG
MCHC RBC AUTO-ENTMCNC: 31.4 G/DL
MCV RBC AUTO: 79 FL
MONOCYTES # BLD AUTO: 0.2 K/UL
MONOCYTES NFR BLD: 3.3 %
NEUTROPHILS # BLD AUTO: 3.7 K/UL
NEUTROPHILS NFR BLD: 82.5 %
NITRITE UR QL STRIP: NEGATIVE
NRBC BLD-RTO: 0 /100 WBC
PH UR STRIP: 5 [PH] (ref 5–8)
PLATELET # BLD AUTO: 149 K/UL
PMV BLD AUTO: 10.8 FL
POTASSIUM SERPL-SCNC: 3.3 MMOL/L
PROT SERPL-MCNC: 6.2 G/DL
PROT UR QL STRIP: NEGATIVE
RBC # BLD AUTO: 4.46 M/UL
SODIUM SERPL-SCNC: 137 MMOL/L
SP GR UR STRIP: 1.02 (ref 1–1.03)
URN SPEC COLLECT METH UR: ABNORMAL
UROBILINOGEN UR STRIP-ACNC: NEGATIVE EU/DL
WBC # BLD AUTO: 4.48 K/UL

## 2018-06-16 PROCEDURE — 99284 EMERGENCY DEPT VISIT MOD MDM: CPT | Mod: 25

## 2018-06-16 PROCEDURE — 85025 COMPLETE CBC W/AUTO DIFF WBC: CPT

## 2018-06-16 PROCEDURE — 25000003 PHARM REV CODE 250: Performed by: EMERGENCY MEDICINE

## 2018-06-16 PROCEDURE — 81003 URINALYSIS AUTO W/O SCOPE: CPT

## 2018-06-16 PROCEDURE — 99284 EMERGENCY DEPT VISIT MOD MDM: CPT | Mod: ,,, | Performed by: EMERGENCY MEDICINE

## 2018-06-16 PROCEDURE — 80053 COMPREHEN METABOLIC PANEL: CPT

## 2018-06-16 RX ORDER — DOXYLAMINE SUCCINATE AND PYRIDOXINE HYDROCHLORIDE, DELAYED RELEASE TABLETS 10 MG/10 MG 10; 10 MG/1; MG/1
2 TABLET, DELAYED RELEASE ORAL NIGHTLY PRN
Qty: 14 TABLET | Refills: 0 | Status: SHIPPED | OUTPATIENT
Start: 2018-06-16 | End: 2018-06-28

## 2018-06-16 RX ADMIN — POTASSIUM BICARBONATE 25 MEQ: 25 TABLET, EFFERVESCENT ORAL at 05:06

## 2018-06-16 NOTE — ED NOTES
LOC: Pt is AAOx3,  Speech is clear. Answers questions appropriately  APPEARANCE: Pt is dressed appropriately and clean. .   HEENT: WNL  CARDIAC: Pt has a normal heart rate and regular rythem. No peripheral edema. Capillary refill less than 3 seconds.   RESPIRATORY: Pt's respirations are even, unlabored. Lungs are clear  in all lung fields. No c/o cough, No sputum production. No SOB.  GI : Pt started with nausea and vomiting today, laid down to take a nap and when she woke up she noticed a rash to her face. She called her OB-GYN and they told her to go the ER. Pt denies any lower abd cramping, spotting. LMP- 1/2018 PT is 16 WEEKS PREGNANT.. Last BM:6/15/2018  : WNL  MUSCULOSKELETAL: Moves all extremites equally and spontaneously. No swelling . No c/o pain. No deformities noted. Pulses 2 + in all extremities.   SKIN: Pt has a petechai area to lower jaw and neck which started after she had been vomiting  NEURO:  Follows commands appropriately, Facial symmetry is equal. Moves all extremites equally. No c/o loss of sensation to any areas.   PSYCHOSOCIAL: Pt is cooperative with exam and answering questions appropriately as asked. . Speaks in a normal tone of voice    Pt is sipping a Gatoraide, and doing OK with it

## 2018-06-16 NOTE — ED PROVIDER NOTES
Encounter Date: 2018    SCRIBE #1 NOTE: I, Dar Rowan, am scribing for, and in the presence of,  Dr. Pappas. I have scribed the following portions of the note - Other sections scribed: the Disposition.       History     Chief Complaint   Patient presents with    Skin Problem     16 weeks pregnant, vomited alot, not now petechai around mouth and neck     Mrs. Matias is a 28 year old 16 week pregnant female who presents to the ED with skin rash. The patient states that she began to feel nauseas on Friday but did not vomit. Earlier this morning the patient states she had several episodes for forceful vomiting, she states initially vomiting food then just gastric contents. The patient states she then went and had a nap after which she awoke with a rash over her upper neck and lower jaw. She states that the rash has been constant and is not spreading, it is not pruritic nor painful. The patient denies any new medications or foods. She also denies any difficulty breathing, lip or throat swelling. The patient denies any chest pain, abdominal pain, SOB, cough, fever, dysuria.           Review of patient's allergies indicates:  No Known Allergies  Past Medical History:   Diagnosis Date    Acid reflux      Past Surgical History:   Procedure Laterality Date    BREAST SURGERY      AUGMENTATION     SECTION  2017    TONSILLECTOMY       Family History   Problem Relation Age of Onset    Breast cancer Mother 45    Hyperlipidemia Mother     Cancer Mother     No Known Problems Daughter     Colon cancer Neg Hx     Ovarian cancer Neg Hx      Social History   Substance Use Topics    Smoking status: Never Smoker    Smokeless tobacco: Never Used    Alcohol use No     Review of Systems   Constitutional: Positive for appetite change. Negative for activity change, chills, diaphoresis, fatigue and fever.   HENT: Negative for sore throat and trouble swallowing.    Eyes: Negative for photophobia and  visual disturbance.   Respiratory: Negative for cough, chest tightness, shortness of breath and wheezing.    Cardiovascular: Negative for chest pain, palpitations and leg swelling.   Gastrointestinal: Positive for nausea and vomiting. Negative for abdominal distention, abdominal pain, blood in stool, constipation and diarrhea.   Genitourinary: Negative for dysuria and hematuria.   Musculoskeletal: Negative for arthralgias, myalgias, neck pain and neck stiffness.   Skin: Positive for rash. Negative for wound.   Neurological: Negative for dizziness, seizures, syncope, weakness, light-headedness, numbness and headaches.       Physical Exam     Initial Vitals [06/16/18 1512]   BP Pulse Resp Temp SpO2   99/61 (!) 124 18 98.2 °F (36.8 °C) 100 %      MAP       --         Physical Exam    Vitals reviewed.  Constitutional: She appears well-developed and well-nourished. She is not diaphoretic. No distress.   HENT:   Head: Normocephalic and atraumatic.   Right Ear: External ear normal.   Left Ear: External ear normal.   Mouth/Throat: Oropharynx is clear and moist. No oropharyngeal exudate.   Eyes: EOM are normal. No scleral icterus.   Neck: Normal range of motion. Neck supple.   Cardiovascular: Normal rate, regular rhythm and normal heart sounds.   No murmur heard.  Pulmonary/Chest: Breath sounds normal. No respiratory distress. She has no wheezes. She has no rhonchi. She has no rales.   Abdominal: Soft. Bowel sounds are normal. She exhibits distension. There is no tenderness. There is no rebound and no guarding.   Musculoskeletal: She exhibits no edema or tenderness.   Neurological: She is alert.   Skin: Rash (diffuse petechial rash across bottom of jaw and upper neck, non-blanching) noted.   Psychiatric: She has a normal mood and affect. Her behavior is normal. Thought content normal.         ED Course   Procedures  Labs Reviewed   CBC W/ AUTO DIFFERENTIAL - Abnormal; Notable for the following:        Result Value     Hemoglobin 11.0 (*)     Hematocrit 35.0 (*)     MCV 79 (*)     MCH 24.7 (*)     MCHC 31.4 (*)     RDW 15.9 (*)     Platelets 149 (*)     Lymph # 0.6 (*)     Mono # 0.2 (*)     Gran% 82.5 (*)     Lymph% 12.9 (*)     Mono% 3.3 (*)     All other components within normal limits   COMPREHENSIVE METABOLIC PANEL - Abnormal; Notable for the following:     Potassium 3.3 (*)     CO2 19 (*)     Calcium 8.3 (*)     Albumin 3.0 (*)     Alkaline Phosphatase 49 (*)     All other components within normal limits   URINALYSIS, REFLEX TO URINE CULTURE - Abnormal; Notable for the following:     Appearance, UA Hazy (*)     Ketones, UA 3+ (*)     All other components within normal limits    Narrative:     Preferred Collection Type->Urine, Clean Catch          US OB Limited 1 Or More Gestations   Final Result      Single, viable, intrauterine gestation with a combine biometric derived estimated gestational age of 18 weeks 1 day and an KAMLESH of 11/16/2018. Further evaluation can be performed at other institution if clinically indicated.      Electronically signed by resident: Tru Zurita   Date:    06/16/2018   Time:    16:47      Electronically signed by: Marbella Alejandro MD   Date:    06/16/2018   Time:    16:55           Medical Decision Making:   History:   Old Medical Records: I decided to obtain old medical records.  Clinical Tests:   Lab Tests: Ordered and Reviewed  Radiological Study: Ordered and Reviewed  ED Management:  Most c/w emesis related petechiae. Will d/c home. Benign otherwise.        APC / Resident Notes:   Mrs. Matias is a 28 year old 16 week pregnant women who presents to the ED with a facial rash. Patient states that she had several episodes of forceful vomiting after which she went a had a nap. Upon awakening the patient noted a rash over the bottom of her jaw and upper neck. She called her OB nurse who advised her to present to the ED.     3:50 PM: Patient assessed, petechial rash noted to lower jaw and upper  neck. Not pruritic, not painful, non-blanching. Patient states that the rash has not spread. Denies any difficulty breathing or airway edema. Will check basic labs to assess electrolyte and LFTs as well as CBC for platelet count. Will also order routine UA to assess for asymptomatic bacteruria as well as routine fetal heart rate US.     5:33 PM: Patient's labs unremarkable, mild hypokalemia. Replaced potassium. US showing  single intrauterine gestation with a combined biometric derived estimated gestational age of 18 weeks 1 day and an KAMLESH of 11/16/2018.  BPD measures 13.9 cm.  Heart rate measures 169 beats per minute. UA with no bacteria or protein, mild ketonuria.     5:39 PM: Patient stable for discharge. Will write script for diclegis PRN for continued nausea.        Scribe Attestation:   Scribe #1: I performed the above scribed service and the documentation accurately describes the services I performed. I attest to the accuracy of the note.               Clinical Impression:   The primary encounter diagnosis was Morning sickness. Diagnoses of Rash and Petechiae were also pertinent to this visit.      Disposition:   Disposition: Discharged  Condition: Stable                        Gaudencio Pappas MD  06/16/18 7882

## 2018-06-16 NOTE — ED NOTES
Pt without complaints at present.  Resp even/non-labored.  Updated on status.   at bedside.  Siderails up x 2/call light in reach.

## 2018-06-28 ENCOUNTER — ROUTINE PRENATAL (OUTPATIENT)
Dept: OBSTETRICS AND GYNECOLOGY | Facility: CLINIC | Age: 28
End: 2018-06-28
Payer: COMMERCIAL

## 2018-06-28 VITALS
DIASTOLIC BLOOD PRESSURE: 76 MMHG | WEIGHT: 196.19 LBS | SYSTOLIC BLOOD PRESSURE: 124 MMHG | BODY MASS INDEX: 30.73 KG/M2

## 2018-06-28 DIAGNOSIS — Z3A.18 18 WEEKS GESTATION OF PREGNANCY: Primary | ICD-10-CM

## 2018-06-28 PROCEDURE — 99999 PR PBB SHADOW E&M-EST. PATIENT-LVL III: CPT | Mod: PBBFAC,,, | Performed by: OBSTETRICS & GYNECOLOGY

## 2018-06-28 PROCEDURE — 0502F SUBSEQUENT PRENATAL CARE: CPT | Mod: S$GLB,,, | Performed by: OBSTETRICS & GYNECOLOGY

## 2018-06-28 NOTE — PROGRESS NOTES
"Went to [facility] main campus for facial swelling/rash after vomiting episode. They gave her potassium liquid.  Anatomy scan scheduled for 7-3.  Handout given on "Baby Led Feeding"  "

## 2018-07-03 ENCOUNTER — OFFICE VISIT (OUTPATIENT)
Dept: MATERNAL FETAL MEDICINE | Facility: CLINIC | Age: 28
End: 2018-07-03
Attending: OBSTETRICS & GYNECOLOGY
Payer: COMMERCIAL

## 2018-07-03 DIAGNOSIS — Z36.89 ENCOUNTER FOR FETAL ANATOMIC SURVEY: ICD-10-CM

## 2018-07-03 DIAGNOSIS — Z3A.14 14 WEEKS GESTATION OF PREGNANCY: ICD-10-CM

## 2018-07-03 PROCEDURE — 99499 UNLISTED E&M SERVICE: CPT | Mod: S$GLB,,, | Performed by: OBSTETRICS & GYNECOLOGY

## 2018-07-03 PROCEDURE — 76805 OB US >/= 14 WKS SNGL FETUS: CPT | Mod: S$GLB,,, | Performed by: OBSTETRICS & GYNECOLOGY

## 2018-07-03 NOTE — LETTER
July 3, 2018      Robert Shields MD  [address]  [address]  [facility] [address]           [facility]  [address]  [facility] [address]  Phone: [phone]          Patient: Leydi Matias   MR Number: [id number]   YOB: 1990   Date of Visit: 7/3/2018       Dear Dr. Robert Shields:    Thank you for referring Leydi Matias to me for evaluation. Attached you will find relevant portions of my assessment and plan of care.    If you have questions, please do not hesitate to call me. I look forward to following Leydi Matias along with you.    Sincerely,    Benita Cuadra MD    Enclosure  CC:  No Recipients    If you would like to receive this communication electronically, please contact [email or web address] or [phone] to request more information on [employer] Link access.    For providers and/or their staff who would like to refer a patient to [facility], please contact us through our one-stop-shop provider referral line, [facility], at [phone].    If you feel you have received this communication in error or would no longer like to receive these types of communications, please e-mail [email or web address]

## 2018-08-09 ENCOUNTER — ROUTINE PRENATAL (OUTPATIENT)
Dept: OBSTETRICS AND GYNECOLOGY | Facility: CLINIC | Age: 28
End: 2018-08-09
Payer: COMMERCIAL

## 2018-08-09 VITALS — BODY MASS INDEX: 32.8 KG/M2 | SYSTOLIC BLOOD PRESSURE: 116 MMHG | DIASTOLIC BLOOD PRESSURE: 74 MMHG | WEIGHT: 209.44 LBS

## 2018-08-09 DIAGNOSIS — Z3A.24 24 WEEKS GESTATION OF PREGNANCY: Primary | ICD-10-CM

## 2018-08-09 PROCEDURE — 99999 PR PBB SHADOW E&M-EST. PATIENT-LVL II: CPT | Mod: PBBFAC,,, | Performed by: OBSTETRICS & GYNECOLOGY

## 2018-08-09 PROCEDURE — 0502F SUBSEQUENT PRENATAL CARE: CPT | Mod: S$GLB,,, | Performed by: OBSTETRICS & GYNECOLOGY

## 2018-08-12 ENCOUNTER — NURSE TRIAGE (OUTPATIENT)
Dept: ADMINISTRATIVE | Facility: CLINIC | Age: 28
End: 2018-08-12

## 2018-08-12 ENCOUNTER — TELEPHONE (OUTPATIENT)
Dept: OBSTETRICS AND GYNECOLOGY | Facility: CLINIC | Age: 28
End: 2018-08-12

## 2018-08-12 DIAGNOSIS — K52.9 GASTROENTERITIS: Primary | ICD-10-CM

## 2018-08-12 RX ORDER — ONDANSETRON 4 MG/1
8 TABLET, ORALLY DISINTEGRATING ORAL EVERY 8 HOURS
Qty: 20 TABLET | Refills: 0 | Status: ON HOLD | OUTPATIENT
Start: 2018-08-12 | End: 2018-11-21 | Stop reason: HOSPADM

## 2018-08-12 NOTE — TELEPHONE ENCOUNTER
"    Reason for Disposition   [1] SEVERE diarrhea (e.g., 7 or more times / day more than normal) AND [2]  age > 60 years    Answer Assessment - Initial Assessment Questions  1. DIARRHEA SEVERITY: "How bad is the diarrhea?" "How many extra stools have you had in the past 24 hours than normal?"     - MILD: Few loose or mushy BMs; increase of 1-3 stools over normal daily number of stools; mild increase in ostomy output.    - MODERATE: Increase of 4-6 stools daily over normal; moderate increase in ostomy output.    - SEVERE (or Worst Possible): Increase of 7 or more stools daily over normal; moderate increase in ostomy output; incontinence.      moderate  2. ONSET: "When did the diarrhea begin?"       Last night  3. BM CONSISTENCY: "How loose or watery is the diarrhea?"       unnaswered  4. VOMITING: "Are you also vomiting?" If so, ask: "How many times in the past 24 hours?"       nausea  5. ABDOMINAL PAIN: "Are you having any abdominal pain?" If yes: "What does it feel like?" (e.g., crampy, dull, intermittent, constant)       unanswered  6. ABDOMINAL PAIN SEVERITY: If present, ask: "How bad is the pain?"  (e.g., Scale 1-10; mild, moderate, or severe)     - MILD (1-3): doesn't interfere with normal activities, abdomen soft and not tender to touch      - MODERATE (4-7): interferes with normal activities or awakens from sleep, tender to touch      - SEVERE (8-10): excruciating pain, doubled over, unable to do any normal activities        Diarrhea and anause  7. ORAL INTAKE: If vomiting, "Have you been able to drink liquids?" "How much fluids have you had in the past 24 hours?"      unanswerd  8. HYDRATION: "Any signs of dehydration?" (e.g., dry mouth [not just dry lips], too weak to stand, dizziness, new weight loss) "When did you last urinate?"      Call transferred   9. EXPOSURE: "Have you traveled to a foreign country recently?" "Have you been exposed to anyone with diarrhea?" "Could you have eaten any food that was " "spoiled?"      unanswered  10. OTHER SYMPTOMS: "Do you have any other symptoms?" (e.g., fever, blood in stool)        nausea  11. PREGNANCY: "Is there any chance you are pregnant?" "When was your last menstrual period?"        24weeks    Protocols used: ST DIARRHEA-RONDA-      "

## 2018-08-12 NOTE — TELEPHONE ENCOUNTER
Patient with viral GI symptoms. Able to tolerate liquids. Discussed need for ^^^PO hydration (water + gatorade) and rest. Denies F/C. Denies abdominal pain.  AMANDA precautions given. Questions answered.  Will Rx Zofran to pharmacy  Work note sent in through [facility]

## 2018-08-21 ENCOUNTER — LAB VISIT (OUTPATIENT)
Dept: LAB | Facility: HOSPITAL | Age: 28
End: 2018-08-21
Attending: OBSTETRICS & GYNECOLOGY
Payer: COMMERCIAL

## 2018-08-21 DIAGNOSIS — Z3A.24 24 WEEKS GESTATION OF PREGNANCY: ICD-10-CM

## 2018-08-21 LAB
BASOPHILS # BLD AUTO: 0.01 K/UL
BASOPHILS NFR BLD: 0.1 %
DIFFERENTIAL METHOD: ABNORMAL
EOSINOPHIL # BLD AUTO: 0.2 K/UL
EOSINOPHIL NFR BLD: 2.2 %
ERYTHROCYTE [DISTWIDTH] IN BLOOD BY AUTOMATED COUNT: 17.9 %
GLUCOSE SERPL-MCNC: 103 MG/DL
HCT VFR BLD AUTO: 31.6 %
HGB BLD-MCNC: 9.6 G/DL
IMM GRANULOCYTES # BLD AUTO: 0.02 K/UL
IMM GRANULOCYTES NFR BLD AUTO: 0.3 %
LYMPHOCYTES # BLD AUTO: 1.7 K/UL
LYMPHOCYTES NFR BLD: 25.1 %
MCH RBC QN AUTO: 24.6 PG
MCHC RBC AUTO-ENTMCNC: 30.4 G/DL
MCV RBC AUTO: 81 FL
MONOCYTES # BLD AUTO: 0.3 K/UL
MONOCYTES NFR BLD: 3.7 %
NEUTROPHILS # BLD AUTO: 4.7 K/UL
NEUTROPHILS NFR BLD: 68.6 %
NRBC BLD-RTO: 0 /100 WBC
PLATELET # BLD AUTO: 171 K/UL
PMV BLD AUTO: 11.6 FL
RBC # BLD AUTO: 3.91 M/UL
WBC # BLD AUTO: 6.78 K/UL

## 2018-08-21 PROCEDURE — 85025 COMPLETE CBC W/AUTO DIFF WBC: CPT

## 2018-08-21 PROCEDURE — 82950 GLUCOSE TEST: CPT

## 2018-08-22 NOTE — PROGRESS NOTES
Hi! Your glucose tolerance was normal which means you do NOT have gestational diabetes.   However you are slightly anemic due to pregnancy.  This is very common at the end of pregnancy.   I recommend that you start an over-the-counter iron once a day in addition to prenatal vitamin.  Hopefully with the extra iron, you'll have more energy and will be less tired.  Take care,  Dr. Shields

## 2018-08-27 RX ORDER — VALACYCLOVIR HYDROCHLORIDE 1 G/1
1000 TABLET, FILM COATED ORAL 2 TIMES DAILY
Qty: 60 TABLET | Refills: 6 | Status: SHIPPED | OUTPATIENT
Start: 2018-08-27 | End: 2020-02-20

## 2018-09-05 ENCOUNTER — TELEPHONE (OUTPATIENT)
Dept: OBSTETRICS AND GYNECOLOGY | Facility: CLINIC | Age: 28
End: 2018-09-05

## 2018-09-05 NOTE — TELEPHONE ENCOUNTER
Dr. Shields ob patient- 28 weeks- not feeling well, very nauseated -having other symptoms, please bebeto patient back

## 2018-09-05 NOTE — TELEPHONE ENCOUNTER
"28 2/7 week OB c/o nausea, increased fetal movement and her stomach got tight, she didn't feel well.    It wasn't rhythmic cramping like contractions.  Reassured her that the increased movement is OK and she can't have "too much movement".  Recommended rest and hydration.  Advised if the nausea gets worse to let us know or go to PCP as she may have a virus.    "

## 2018-09-06 ENCOUNTER — ROUTINE PRENATAL (OUTPATIENT)
Dept: OBSTETRICS AND GYNECOLOGY | Facility: CLINIC | Age: 28
End: 2018-09-06
Payer: COMMERCIAL

## 2018-09-06 VITALS — BODY MASS INDEX: 32.8 KG/M2 | SYSTOLIC BLOOD PRESSURE: 110 MMHG | DIASTOLIC BLOOD PRESSURE: 60 MMHG | WEIGHT: 209.44 LBS

## 2018-09-06 DIAGNOSIS — M54.31 BILATERAL SCIATICA: Primary | ICD-10-CM

## 2018-09-06 DIAGNOSIS — M54.32 BILATERAL SCIATICA: Primary | ICD-10-CM

## 2018-09-06 PROCEDURE — 0502F SUBSEQUENT PRENATAL CARE: CPT | Mod: S$GLB,,, | Performed by: OBSTETRICS & GYNECOLOGY

## 2018-09-06 PROCEDURE — 99999 PR PBB SHADOW E&M-EST. PATIENT-LVL III: CPT | Mod: PBBFAC,,, | Performed by: OBSTETRICS & GYNECOLOGY

## 2018-09-13 ENCOUNTER — CLINICAL SUPPORT (OUTPATIENT)
Dept: REHABILITATION | Facility: HOSPITAL | Age: 28
End: 2018-09-13
Payer: COMMERCIAL

## 2018-09-13 DIAGNOSIS — M53.3 SACROILIAC PAIN DURING PREGNANCY: ICD-10-CM

## 2018-09-13 DIAGNOSIS — O99.891 SACROILIAC PAIN DURING PREGNANCY: ICD-10-CM

## 2018-09-13 PROCEDURE — 97161 PT EVAL LOW COMPLEX 20 MIN: CPT | Mod: PN

## 2018-09-13 PROCEDURE — 97110 THERAPEUTIC EXERCISES: CPT | Mod: PN

## 2018-09-13 NOTE — PLAN OF CARE
[facility]  Physical Therapy Initial Evaluation    Name: Leydi Matias  Clinic Number: [id number]    Therapy Diagnosis:   Encounter Diagnosis   Name Primary?    Sacroiliac pain during pregnancy      Physician: Robert Shields MD    Physician Orders: PT Eval and Treat   Medical Diagnosis: M54.31,M54.32 (ICD-10-CM) - Bilateral sciatica  Evaluation Date: 2018  Authorization Period Expiration: -  Plan of Care Certification Period: 2018 to 10/13/2018   Visit # / Visits authorized:  -    Time In: 1505  Time Out: 1545  Total Billable Time: 40 minutes  Precautions: Pregnant 6.5 months.      Subjective   Leydi reports that she is 6 months pregnant.  The last time she was pregnant 2 years ago she had LBP but she states that this time she is having worse back pain.  Reports pain starting in her back and primarily running down the back of her R thigh to her knee.  Worse at the end of the day and reports no pain at the beginning of her day. 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 Starts her work day at 5 AM and by 2-3 PM she begins having pain. However, this is also the pattern on her off days. Not lifting anymore.  B posterior leg pain to her knees.  Also with B inner thigh numbness.  Denies BLE weakness.  Denies hip pain.     Past Medical History:   Diagnosis Date    Acid reflux      Leydi Matias  has a past surgical history that includes Breast surgery; Tonsillectomy;  section (2017); and DELIVERY- SECTION (N/A, 2017).    Leydi has a current medication list which includes the following prescription(s): ondansetron, pnv 67-iron ps-folate no.1-dha, and valacyclovir.    Review of patient's allergies indicates:  No Known Allergies     Imaging: none  Prior Therapy: none  Social History: Lives at home with her  and 16 m/o child.   Occupation: see above  Prior Level of Function:  see above      Pain:  Current 4/10, worst 8/10, best 0/10   Location: left back  and buttocks   Description: Tingling and Sharp  Aggravating Factors: Walking  Easing Factors: relaxation    Pts goals: To be able to decrease her pain so she can work     Objective   Palpation:  TTP directly over L PSIS and along lateral sacral border.     Posture:  Normal iliac crest level.     Gross movement analysis:  -Gait: pregnancy gait pattern  -Forward bending: NT  -Squat: NT    Lumbar Range of Motion:    Degrees Pain   Flexion NT NP     Extension NT NP   Left Side Bending NT NP   Right Side Bending NT NP   Left rotation   NT NP   Right Rotation   NT NP        Lower Extremity Strength   Right Left   Hip flexion: 5/5 5/5   Hip extension: 5/5 5/5   Hip abduction: 5/5 5/5   Hip ER:  5/5 5/5   Hip IR: 5/5 5/5   Knee extension: 5/5 5/5   Knee flexion: 5/5 5/5   Ankle dorsiflexion: 5/5 5/5   Great toe extension: 5/5 5/5       Special Tests:  -NICHOLAS test: positive for L SI pain  -Taj's test: positive on the L SIJ pain  -Gaelsen's test: positive on the L SIJ pain  -SLR: positive on the L.   -supine-to-sit LLD test:  (+) posterior L innominate rotation    Joint Mobility: normal hip joint play    Sensation: Intact light touch sensation to BLE through L2-S2 dermatomal distribution    Flexibility:    Ely's test: NT   Popliteal Angle:normal   Jem test: normal   P/SLR test: normal      CMS Impairment/Limitation/Restriction for FOTO Lumbar Survey    Therapist reviewed FOTO scores for Leydi Matias on 9/13/2018.   FOTO documents entered into EPIC - see Media section.    Limitation Score: 53%  Predicated Limitation Score: 29%       TREATMENT   Treatment Time In: 1535  Treatment Time Out: 1545  Total Treatment time separate from Evaluation time:10'    Leydi received therapeutic exercises to develop strength and core stabilization for 10' minutes including: SIJ METS  DATE    VISIT 1   FOTO 1/5       hooklying hip abd/add (belt/ball)  Iso; 10 reps x  5 seconds   Hip flexion isos L At 90 degrees; 10 reps x 5 sec holds               INITIALS JH       Educated in HEP as well as the benefit/need of an SIJ belt.  Had Ronit Espitia, PT discuss with patient about specific SI belts and where locally she could obtain one.       Home Exercises and Patient Education Provided  Education provided re: see above    Written Home Exercises Provided: yes.  Exercises were reviewed and Leydi was able to demonstrate them prior to the end of the session.   Pt received a written copy of exercises to perform at home. Leydi demonstrated good  understanding of the education provided.     See EMR under patient instructions for exercises given.   Assessment   Leydi is a 28 y.o. female referred to outpatient Physical Therapy with a medical diagnosis of Bilateral sciatica pain. Pt presents with L SIJ pain with referred pain into her posterior thigh. (+) SIJ cluster testing. (-) Neural tension testing.  Normal BLE strength.  Mild L posterior innominate rotation correctable with SIJ METs.  Recommend SIJ belt to be worn at work.     Pt prognosis is Excellent.   Pt will benefit from skilled outpatient Physical Therapy to address the deficits stated above and in the chart below, provide pt/family education, and to maximize pt's level of independence.     Plan of care discussed with patient: Yes  Pt's spiritual, cultural and educational needs considered and patient is agreeable to the plan of care and goals as stated below:     Anticipated Barriers for therapy: Pregnant    Medical Necessity is demonstrated by the following  History  Co-morbidities and personal factors that may impact the plan of care Co-morbidities:   Pregnant    Personal Factors:   no deficits     low   Examination  Body Structures and Functions, activity limitations and participation restrictions that may impact the plan of care Body Regions:   back    Body Systems:     strength  gait  transfers  transitions    Participation Restrictions:   Pregnancy    Activity limitations:   Learning and applying knowledge  no deficits    General Tasks and Commands  no deficits    Communication  no deficits    Mobility  lifting and carrying objects  walking    Self care  dressing    Domestic Life  shopping  cooking  doing house work (cleaning house, washing dishes, laundry)    Interactions/Relationships  family relationships    Life Areas  employment    Community and Social Life  no deficits         moderate   Clinical Presentation stable and uncomplicated low   Decision Making/ Complexity Score: low     Goals:  Short Term Goals: 3-4 weeks:  1.  Patient will demonstrate ability to correct innominate rotation with SIJ METs.  2.  Patient will demonstrate ability to correctly zeke SI belt.  3. Patient will report <25% limitation on Lumbar FOTO survey.     Plan   Certification Period/Plan of care expiration: 9/13/2018 to 10/13/2018.    Outpatient Physical Therapy 2 times weekly for 4 weeks to include the following interventions: Moist Heat/ Ice, Neuromuscular Re-ed, Orthotic Management and Training, Patient Education, Self Care, Therapeutic Activites and Therapeutic Exercise.     Yrn Lowe, PT

## 2018-09-17 ENCOUNTER — CLINICAL SUPPORT (OUTPATIENT)
Dept: REHABILITATION | Facility: HOSPITAL | Age: 28
End: 2018-09-17
Payer: COMMERCIAL

## 2018-09-17 DIAGNOSIS — M53.3 SACROILIAC PAIN DURING PREGNANCY: ICD-10-CM

## 2018-09-17 DIAGNOSIS — O99.891 SACROILIAC PAIN DURING PREGNANCY: ICD-10-CM

## 2018-09-17 PROCEDURE — 97110 THERAPEUTIC EXERCISES: CPT | Mod: PN

## 2018-09-18 NOTE — PROGRESS NOTES
Physical Therapy Daily Treatment Note     Name: Leydi Matias  Clinic Number: [id number]    Therapy Diagnosis:   Encounter Diagnosis   Name Primary?    Sacroiliac pain during pregnancy      Physician: Robert Shields MD    Visit Date: 9/17/2018    Physician Orders: PT Eval and Treat   Medical Diagnosis: M54.31,M54.32 (ICD-10-CM) - Bilateral sciatica  Evaluation Date: 9/13/2018  Authorization Period Expiration: -  Plan of Care Certification Period: 09/13/2018 to 10/13/2018   Visit # / Visits authorized: 2/ -     Time In: 1505  Time Out: 1545  Total Billable Time: 40 minutes (3 TE)  Precautions: Pregnant 6.5 months.    Subjective   Mrs. Matias continues to report SIJ area pain; today more so on the R.  During the evaluation her pain was isolated on the L.  She states that she had difficulty getting her SI belt to wear correctly.  She did not bring it with her today.     She was compliant with home exercise program.  Response to previous treatment: no improvement  Functional change: -    Pain: 5/10  Location: right lumbosacral area     Objective   O:  R SIJ pain.  Supine-to-sit LLD test: R anterior innominate rotation; mild.      Leydi received therapeutic exercises to develop strength, posture and core stabilization for 40 minutes including:  See log sheet    DATE 09/17/2018   VISIT 2   FOTO 2/5       hooklying hip add isos 10 reps x 5 second holds   hoolying hip abd isos 10 reps x 5 second holds   hooklying hip abd/add isos 10 reps x 5 second holds       bridges 2x10   Push-pull (sitting) 4 rounds x 3-5 second holds               INITIALS JH         Home Exercises Provided and Patient Education Provided   Education provided:   - wearing/donning her SI belt before her pain onsets.    - performing alternating push/pull METs in sitting.     Written Home Exercises Provided: updated HEP today.  Exercises were reviewed and Leydi was able to demonstrate them prior to the end of the session.  Leydi demonstrated  good  understanding of the education provided.     See EMR under Patient Instructions for exercises provided 9/17/2018.    Assessment   A: B SIJ pain related to pregnancy.  Significant improvement in lumbosacral pain upon departure.     Leydi is progressing well towards her goals.   Pt prognosis is Excellent.     Pt will continue to benefit from skilled outpatient physical therapy to address the deficits listed in the problem list box on initial evaluation, provide pt/family education and to maximize pt's level of independence in the home and community environment.     Pt's spiritual, cultural and educational needs considered and pt agreeable to plan of care and goals.    Anticipated barriers to physical therapy: pregnant    Goals:  Short Term Goals: 3-4 weeks:  1.  Patient will demonstrate ability to correct innominate rotation with SIJ METs.  2.  Patient will demonstrate ability to correctly zeke SI belt.  3. Patient will report <25% limitation on Lumbar FOTO survey.     Plan   Continue plan of care.     Yrn Lowe, PT

## 2018-09-20 ENCOUNTER — CLINICAL SUPPORT (OUTPATIENT)
Dept: REHABILITATION | Facility: HOSPITAL | Age: 28
End: 2018-09-20
Payer: COMMERCIAL

## 2018-09-20 DIAGNOSIS — M53.3 SACROILIAC PAIN DURING PREGNANCY: ICD-10-CM

## 2018-09-20 DIAGNOSIS — O99.891 SACROILIAC PAIN DURING PREGNANCY: ICD-10-CM

## 2018-09-20 NOTE — PROGRESS NOTES
Physical Therapy Daily Treatment Note     Name: Leydi Matias  Clinic Number: [id number]    Therapy Diagnosis:   Encounter Diagnosis   Name Primary?    Sacroiliac pain during pregnancy      Physician: Robert Shields MD    Visit Date: 9/20/2018    Physician Orders: PT Eval and Treat   Medical Diagnosis: M54.31,M54.32 (ICD-10-CM) - Bilateral sciatica  Evaluation Date: 9/13/2018  Authorization Period Expiration: -  Plan of Care Certification Period: 09/13/2018 to 10/13/2018   Visit # / Visits authorized: 3/ -     Time In: 4:25  Time Out: 5:00  Total Billable Time: 35 minutes (2 TE)  Precautions: Pregnant 6.5 months.    Subjective   Pt reports increased L sided low back pain today that is the opposite of what it was last time. Pt has been wearing SI belt, and brought it in today.    She was compliant with home exercise program.  Response to previous treatment: no improvement  Functional change: -    Pain: 5/10  Location: right lumbosacral area     Objective   O:  L SIJ pain.  Supine-to-sit LLD test: L anterior innominate rotation; mod.      Leydi received therapeutic exercises to develop strength, posture and core stabilization for 35 minutes including:  See log sheet    DATE 9/20/18 09/17/2018   VISIT 3 2   FOTO 3/5 2/5        hooklying hip add isos 10 reps x 5 second holds  10 reps x 5 second holds   hoolying hip abd isos 10 reps x 5 second holds  10 reps x 5 second holds   hooklying hip abd/add isos 10 reps x 5 second holds  10 reps x 5 second holds        bridges 2x10 2x10   Push-pull (sitting) MT 4 rounds x 3-5 second holds        Leg Press 2x10 5 plates         INITIALS RIMA FRIAS     Home Exercises Provided and Patient Education Provided   Education provided:   - wearing/donning her SI belt before her pain onsets. Remove SI belt if pain worsens with SI belt.  - performing alternating push/pull METs in sitting.     Written Home Exercises Provided: updated HEP today.  Exercises were reviewed and Leydi was able  to demonstrate them prior to the end of the session.  Leydi demonstrated good  understanding of the education provided.     See EMR under Patient Instructions for exercises provided 9/17/2018.    Assessment   A: Decreased pain after session, and improved mal rotation. Re-check hip level and leg length next visit.    Leydi is progressing well towards her goals.   Pt prognosis is Excellent.     Pt will continue to benefit from skilled outpatient physical therapy to address the deficits listed in the problem list box on initial evaluation, provide pt/family education and to maximize pt's level of independence in the home and community environment.     Pt's spiritual, cultural and educational needs considered and pt agreeable to plan of care and goals.    Anticipated barriers to physical therapy: pregnant    Goals:  Short Term Goals: 3-4 weeks:  1.  Patient will demonstrate ability to correct innominate rotation with SIJ METs.  2.  Patient will demonstrate ability to correctly zeke SI belt.  3. Patient will report <25% limitation on Lumbar FOTO survey.     Plan   Continue plan of care.     Adrien Gavin, PT

## 2018-09-24 ENCOUNTER — CLINICAL SUPPORT (OUTPATIENT)
Dept: REHABILITATION | Facility: HOSPITAL | Age: 28
End: 2018-09-24
Payer: COMMERCIAL

## 2018-09-24 DIAGNOSIS — O99.891 SACROILIAC PAIN DURING PREGNANCY: ICD-10-CM

## 2018-09-24 DIAGNOSIS — M53.3 SACROILIAC PAIN DURING PREGNANCY: ICD-10-CM

## 2018-09-24 PROCEDURE — 97110 THERAPEUTIC EXERCISES: CPT | Mod: PN

## 2018-09-24 NOTE — PROGRESS NOTES
"  Physical Therapy Daily Treatment Note     Name: Leydi Matias  Clinic Number: [id number]    Therapy Diagnosis:   Encounter Diagnosis   Name Primary?    Sacroiliac pain during pregnancy      Physician: Robert Shields MD    Visit Date: 9/24/2018    Physician Orders: PT Eval and Treat   Medical Diagnosis: M54.31,M54.32 (ICD-10-CM) - Bilateral sciatica  Evaluation Date: 9/13/2018  Authorization Period Expiration: -  Plan of Care Certification Period: 09/13/2018 to 10/13/2018   Visit # / Visits authorized: 3/ -     Time In: 1615  Time Out: 1700  Total Billable Time: 45 minutes (3 TE)  Precautions: Pregnant 6.5 months.    Subjective   Leydi reports pain on Friday and Saturday but she states that she had good relief on Sunday.  She states that she tolerated work better today but that she was able to sit for several hours today.      She was compliant with home exercise program.  Response to previous treatment: no improvement  Functional change: -  Pain: 5/10  Location: right lumbosacral area     Objective   O:  L SIJ pain.  Supine-to-sit LLD test: L posterior innominate rotation; mild.  (+) Taj's sign on the L.  (-) SLR, N[place], and Gaelslen's test for L SIJ pain today.     Leydi received therapeutic exercises to develop strength, posture and core stabilization for 40 minutes including:  See log sheet    DATE 09/24/2018 9/20/18 09/17/2018   VISIT 4 3 2   FOTO 4/5 3/5 2/5         hooklying hip add isos Seated 10 repx5" 10 reps x 5 second holds  10 reps x 5 second holds   hoolying hip abd isos Seated 10 reps x 5" 10 reps x 5 second holds  10 reps x 5 second holds   hooklying hip abd/add isos Seated 10 reps x 5" 10 reps x 5 second holds  10 reps x 5 second holds   SLR 3x10 LLE     Bridges 3x10 2x10 2x10   Push-pull (sitting) MT MT 4 rounds x 3-5 second holds   Seated heel press-downs 10 reps x 5" holds     Leg Press  2x10 5 plates          INITIALS JH RIMA JH     Home Exercises Provided and Patient Education Provided "   Education provided:   - wearing/donning her SI belt before her pain onsets. Remove SI belt if pain worsens with SI belt.  - performing alternating push/pull METs in sitting.     Written Home Exercises Provided: updated HEP today.  Exercises were reviewed and Leydi was able to demonstrate them prior to the end of the session.  Leydi demonstrated good  understanding of the education provided.     See EMR under Patient Instructions for exercises provided 9/17/2018.    Assessment   A: L SIJ dysfunction primarily with more consistent presentation of L posterior innominate rotation.  Decr'd pain with and non-antalgic gait following treatment today.  He wore her SIJ during treatment today. Improved pain following supine mat activities as compared to seated SIJ stabilization activities today.     Leydi is progressing well towards her goals.   Pt prognosis is Excellent.     Pt will continue to benefit from skilled outpatient physical therapy to address the deficits listed in the problem list box on initial evaluation, provide pt/family education and to maximize pt's level of independence in the home and community environment.     Pt's spiritual, cultural and educational needs considered and pt agreeable to plan of care and goals.    Anticipated barriers to physical therapy: pregnant    Goals:  Short Term Goals: 3-4 weeks:  1.  Patient will demonstrate ability to correct innominate rotation with SIJ METs.  2.  Patient will demonstrate ability to correctly zeke SI belt.  3. Patient will report <25% limitation on Lumbar FOTO survey.     Plan   Continue plan of care.     Yrn Lowe, PT

## 2018-09-26 ENCOUNTER — CLINICAL SUPPORT (OUTPATIENT)
Dept: REHABILITATION | Facility: HOSPITAL | Age: 28
End: 2018-09-26
Payer: COMMERCIAL

## 2018-09-26 DIAGNOSIS — O99.891 SACROILIAC PAIN DURING PREGNANCY: ICD-10-CM

## 2018-09-26 DIAGNOSIS — M53.3 SACROILIAC PAIN DURING PREGNANCY: ICD-10-CM

## 2018-09-26 PROCEDURE — 97110 THERAPEUTIC EXERCISES: CPT | Mod: PN

## 2018-09-26 NOTE — PROGRESS NOTES
"  Physical Therapy Daily Treatment Note     Name: Leydi Matias  Clinic Number: [id number]    Therapy Diagnosis:   Encounter Diagnosis   Name Primary?    Sacroiliac pain during pregnancy      Physician: Robert Shields MD    Visit Date: 9/26/2018    Physician Orders: PT Eval and Treat   Medical Diagnosis: M54.31,M54.32 (ICD-10-CM) - Bilateral sciatica  Evaluation Date: 9/13/2018  Authorization Period Expiration: -  Plan of Care Certification Period: 09/13/2018 to 10/13/2018   Visit # / Visits authorized: 5/ -     Time In: 1505  Time Out: 1555  Total Billable Time: 50 minutes (3 TE)  Precautions: Pregnant 6.5 months    Subjective     Pt reports: she has been feeling so much better. Decreased pain  She was compliant with home exercise program.  Response to previous treatment: no adverse reaction  Functional change: none    Pain: 0/10  Location: bilateral back  and buttocks      Objective     Leydi received therapeutic exercises to develop strength and flexibility for 50 minutes with PTA 1:1 including:  See log    DATE 9/26/18 09/24/2018 9/20/18   VISIT 5 4 3   FOTO 5/5 DONE 4/5 3/5            hooklying hip add isos Seated 15x5" Seated 10 repx5" 10 reps x 5 second holds    hoolying hip abd isos Seated 15x5" Seated 10 reps x 5" 10 reps x 5 second holds    hooklying hip abd/add isos Seated 15x5" Seated 10 reps x 5" 10 reps x 5 second holds    SLR 3x10 LLE 3x10 LLE     Bridges 3x10 3x10 2x10   Push-pull (sitting) 4 reps MT MT   Seated heel press-downs 15x5" 10 reps x 5" holds     Leg Press 5 plates 2x10   2x10 5 plates            INITIALS SR 1/6 JH RIMA        Home Exercises Provided and Patient Education Provided     Education provided:   - Cont HEP daily    Written Home Exercises Provided: Patient instructed to cont prior HEP.  Exercises were reviewed and Leydi was able to demonstrate them prior to the end of the session.  Leydi demonstrated good  understanding of the education provided.     See EMR under Patient " Instructions for exercises provided prior visit.    Assessment     Pt tolerates therapy without difficulites, no increase in pain  Leydi is progressing well towards her goals.   Pt prognosis is Excellent.     Pt will continue to benefit from skilled outpatient physical therapy to address the deficits listed in the problem list box on initial evaluation, provide pt/family education and to maximize pt's level of independence in the home and community environment.     Pt's spiritual, cultural and educational needs considered and pt agreeable to plan of care and goals.    Anticipated barriers to physical therapy: pregnant    Goals:   Short Term Goals: 3-4 weeks:  1.  Patient will demonstrate ability to correct innominate rotation with SIJ METs.  2.  Patient will demonstrate ability to correctly zeke SI belt.  3. Patient will report <25% limitation on Lumbar FOTO survey.     Plan     Cont POC to progress towards established goals    Michelle Weathers, PTA

## 2018-10-01 ENCOUNTER — TELEPHONE (OUTPATIENT)
Dept: REHABILITATION | Facility: HOSPITAL | Age: 28
End: 2018-10-01

## 2018-10-03 ENCOUNTER — DOCUMENTATION ONLY (OUTPATIENT)
Dept: REHABILITATION | Facility: HOSPITAL | Age: 28
End: 2018-10-03

## 2018-10-03 ENCOUNTER — CLINICAL SUPPORT (OUTPATIENT)
Dept: REHABILITATION | Facility: HOSPITAL | Age: 28
End: 2018-10-03
Payer: COMMERCIAL

## 2018-10-03 DIAGNOSIS — M53.3 SACROILIAC PAIN DURING PREGNANCY: ICD-10-CM

## 2018-10-03 DIAGNOSIS — O99.891 SACROILIAC PAIN DURING PREGNANCY: ICD-10-CM

## 2018-10-03 PROCEDURE — 97110 THERAPEUTIC EXERCISES: CPT | Mod: PN

## 2018-10-03 NOTE — PROGRESS NOTES
"DAILY TREATMENT NOTE    DATE: 10/3/2018    Start Time:  405  Stop Time:  435    PROCEDURES:    TIMED  Procedure Min.   Therex  30           Total Timed Minutes:  30  Total Timed Units:  2  Total Untimed Units:  0  Charges Billed/# of units:  2  TE      Progress/Current Status    Subjective:     Patient ID: Leydi Matias is a 28 y.o. female.  Diagnosis:   1. Sacroiliac pain during pregnancy       Patient reports "I am living my life again." Rates pain today at "0.02/10"  States she was able to [place] her [place] [employer] [place].  "I was able to walk around all day.  I was tired, but OK after resting"       Objective:     Leydi received therapeutic exercises to develop strength and flexibility for 30 minutes with PTA 1:1 including all as per log.      DATE 10/03/18 9/26/18 09/24/2018 9/20/18   VISIT 6 5 4 3   FOTO 6/10 5/5 DONE 4/5 3/5             hooklying hip add isos Seated 15x5" Seated 15x5" Seated 10 repx5" 10 reps x 5 second holds    hoolying hip abd isos Seated 15x5" Seated 15x5" Seated 10 reps x 5" 10 reps x 5 second holds    hooklying hip abd/add isos Seated 15x5" Seated 15x5" Seated 10 reps x 5" 10 reps x 5 second holds    SLR 3x10 L LE 3x10 LLE 3x10 LLE     Bridges 3x10 3x10 3x10 2x10   Push-pull (sitting) NT 4 reps MT MT   Seated heel press-downs Seated 15x5" 15x5" 10 reps x 5" holds     Leg Press 5.0 3x10 DL 5 plates 2x10   2x10 5 plates             INITIALS DH 2/6 SR 1/6 JH RIMA       Assessment:     Patient progress well. No rotation noted today.  Able to complete all therex with reports of "Good" after treatment.    Patient Education/Response:     Patient educated to continue HEP. Edu to lighten load in her purse.  Verbalized understanding. Instructed in and patient practiced log roll technique for supine <> sit.  Demonstrated understanding.    Plans and Goals:     Continue PT per POC, progress as able.    Short Term Goals: 3-4 weeks:  1.  Patient will demonstrate ability to " correct innominate rotation with SIJ METs.  2.  Patient will demonstrate ability to correctly zeke SI belt.  3. Patient will report <25% limitation on Lumbar FOTO survey.

## 2018-10-03 NOTE — PROGRESS NOTES
Face to Face PTA Conference performed with Annabelle Ochoa PTA, Michelle Dominguez regarding patient's current status, overall progress, and plan of care    Yrn Lowe, PT, DPT    Face to face meeting completed with Yrn Lowe PT regarding current status and progress of   Leydi Matias .  Annabelle Ochoa PTA    Face to face meeting completed with Yrn Hill PT regarding current status and progress of   Leydi Matias .  Michelle Weathers, PTA

## 2018-10-04 ENCOUNTER — ROUTINE PRENATAL (OUTPATIENT)
Dept: OBSTETRICS AND GYNECOLOGY | Facility: CLINIC | Age: 28
End: 2018-10-04
Payer: COMMERCIAL

## 2018-10-04 VITALS — SYSTOLIC BLOOD PRESSURE: 112 MMHG | BODY MASS INDEX: 33.7 KG/M2 | DIASTOLIC BLOOD PRESSURE: 70 MMHG | WEIGHT: 215.19 LBS

## 2018-10-04 DIAGNOSIS — Z3A.32 32 WEEKS GESTATION OF PREGNANCY: Primary | ICD-10-CM

## 2018-10-04 PROCEDURE — 0502F SUBSEQUENT PRENATAL CARE: CPT | Mod: S$GLB,,, | Performed by: OBSTETRICS & GYNECOLOGY

## 2018-10-04 PROCEDURE — 99999 PR PBB SHADOW E&M-EST. PATIENT-LVL II: CPT | Mod: PBBFAC,,, | Performed by: OBSTETRICS & GYNECOLOGY

## 2018-10-08 ENCOUNTER — CLINICAL SUPPORT (OUTPATIENT)
Dept: REHABILITATION | Facility: HOSPITAL | Age: 28
End: 2018-10-08
Attending: OBSTETRICS & GYNECOLOGY
Payer: COMMERCIAL

## 2018-10-08 DIAGNOSIS — M53.3 SACROILIAC PAIN DURING PREGNANCY: ICD-10-CM

## 2018-10-08 DIAGNOSIS — O99.891 SACROILIAC PAIN DURING PREGNANCY: ICD-10-CM

## 2018-10-08 PROCEDURE — 97110 THERAPEUTIC EXERCISES: CPT | Mod: PN

## 2018-10-08 NOTE — PROGRESS NOTES
"  Physical Therapy Daily Treatment Note     Name: Leydi Matias  Clinic Number: [id number]    Therapy Diagnosis:   Encounter Diagnosis   Name Primary?    Sacroiliac pain during pregnancy      Physician: Robert Shields MD    Visit Date: 10/8/2018    PROCEDURES:    Time In: 4:05  Time Out: 4:40     TIMED  Procedure Min.   Therex  35           Total Timed Minutes:  35  Total Timed Units:  2  Total Untimed Units:  0  Charges Billed/# of units:  2  TE    Subjective     Pt reports: she is doing well today and has not had an increase in pain since her last visit  She was compliant with home exercise program.  Response to previous treatment: no adverse effects  Functional change: none    Pain: 3/10  Location: left lumbar and glutes      Objective     Leydi received therapeutic exercises to develop strength, ROM and posture for 35 minutes including:  See log below       DATE 10/8/18 10/03/18 9/26/18 09/24/2018 9/20/18   VISIT 7 6 5 4 3   FOTO 7/10 6/10 5/5 DONE 4/5 3/5                hooklying hip add isos Seated 15x5" Seated 15x5" Seated 15x5" Seated 10 repx5" 10 reps x 5 second holds    hoolying hip abd isos Seated 15x5" Seated 15x5" Seated 15x5" Seated 10 reps x 5" 10 reps x 5 second holds    hooklying hip abd/add isos Seated 15x5" Seated 15x5" Seated 15x5" Seated 10 reps x 5" 10 reps x 5 second holds    SLR 3x10 LLE 3x10 L LE 3x10 LLE 3x10 LLE     Bridges 3x10 3x10 3x10 3x10 2x10   Push-pull (sitting) 3 reps NT 4 reps MT MT   Seated heel press-downs Seated 15x5" Seated 15x5" 15x5" 10 reps x 5" holds     Leg Press 5.0 3x10 DL 5.0 3x10 DL 5 plates 2x10   2x10 5 plates                INITIALS AC 3/6 DH 2/6 SR 1/6 JH RIMA        Home Exercises Provided and Patient Education Provided     Education provided:   - Pt instructed to continue HEP and provided a print out of exercises.    Written Home Exercises Provided: Patient instructed to cont prior HEP.  Exercises were reviewed and Leydi was able to demonstrate them prior " to the end of the session.  Leydi demonstrated good  understanding of the education provided.     See EMR under Patient Instructions for exercises provided prior visit.    Assessment     Pt able to complete all exercises with no c/o pain. Pt mild leg length discrepancy corrected with push pull today.   Leydi is progressing well towards her goals.   Pt prognosis is Excellent.     Pt will continue to benefit from skilled outpatient physical therapy to address the deficits listed in the problem list box on initial evaluation, provide pt/family education and to maximize pt's level of independence in the home and community environment.     Pt's spiritual, cultural and educational needs considered and pt agreeable to plan of care and goals.    Anticipated barriers to physical therapy: none       Short Term Goals: 3-4 weeks:  1.  Patient will demonstrate ability to correct innominate rotation with SIJ METs.  2.  Patient will demonstrate ability to correctly zeke SI belt.  3. Patient will report <25% limitation on Lumbar FOTO survey    Plan     Continue POC. Progress as tolerated.    Mariam Edouard, PTA

## 2018-10-17 ENCOUNTER — CLINICAL SUPPORT (OUTPATIENT)
Dept: REHABILITATION | Facility: HOSPITAL | Age: 28
End: 2018-10-17
Payer: COMMERCIAL

## 2018-10-17 DIAGNOSIS — O99.891 SACROILIAC PAIN DURING PREGNANCY: ICD-10-CM

## 2018-10-17 DIAGNOSIS — M53.3 SACROILIAC PAIN DURING PREGNANCY: ICD-10-CM

## 2018-10-17 PROCEDURE — 97110 THERAPEUTIC EXERCISES: CPT | Mod: PN

## 2018-10-17 NOTE — PROGRESS NOTES
"  Physical Therapy Daily Treatment Note     Name: Leydi Matias  Clinic Number: [id number]    Therapy Diagnosis:   Encounter Diagnosis   Name Primary?    Sacroiliac pain during pregnancy      Physician: Robert Shields MD    Visit Date: 10/17/2018    PROCEDURES:     Time In: 4:55  Time Out: 5:30     TIMED  Procedure Min.   Therex  35            Total Timed Minutes:  35  Total Timed Units:  2  Total Untimed Units:  0  Charges Billed/# of units:  TE-2    Subjective     Pt reports: she is doing well today. Pt reports a lot of pain 2 nights ago, which improved the next morning after performing her exercises.   She was compliant with home exercise program.  Response to previous treatment: no adverse effects  Functional change: none     Pain: 2/10  Location: L lumbar     Objective     Leydi received therapeutic exercises to develop strength, ROM and posture for 35 minutes including:  See log below       DATE 10/17/18 10/8/18 10/03/18 9/26/18 09/24/2018 9/20/18   VISIT 8 7 6 5 4 3   FOTO 8/10 7/10 6/10 5/5 DONE 4/5 3/5                  hooklying hip add isos Seated 20x5" Seated 15x5" Seated 15x5" Seated 15x5" Seated 10 repx5" 10 reps x 5 second holds    hoolying hip abd isos Seated 20x5" Seated 15x5" Seated 15x5" Seated 15x5" Seated 10 reps x 5" 10 reps x 5 second holds    hooklying hip abd/add isos Seated 20x5" Seated 15x5" Seated 15x5" Seated 15x5" Seated 10 reps x 5" 10 reps x 5 second holds    SLR 3x10 L 3x10 LLE 3x10 L LE 3x10 LLE 3x10 LLE     Bridges 3x10 3x10 3x10 3x10 3x10 2x10   Push-pull (sitting) 4 reps 3 reps NT 4 reps MT MT   Seated heel press-downs Seated 20x5" Seated 15x5" Seated 15x5" 15x5" 10 reps x 5" holds     Leg Press 5.0 3x10 DL 5.0 3x10 DL 5.0 3x10 DL 5 plates 2x10   2x10 5 plates                  INITIALS AC 4/6 AC 3/6 DH 2/6 SR 1/6 JH RIMA         Home Exercises Provided and Patient Education Provided   Education provided:   - Cont Hep    Written Home Exercises Provided: Patient instructed to cont " prior HEP.  Exercises were reviewed and Leydi was able to demonstrate them prior to the end of the session.  Leydi demonstrated good  understanding of the education provided.   See EMR under Patient Instructions for exercises provided prior visit.    Assessment   Pt able to increase with reps with no c/o pain. Able to correct L posterior innominate rotation with push/pull today.   Leydi is progressing well towards her goals.   Pt prognosis is Excellent.     Pt will continue to benefit from skilled outpatient physical therapy to address the deficits listed in the problem list box on initial evaluation, provide pt/family education and to maximize pt's level of independence in the home and community environment.   Pt's spiritual, cultural and educational needs considered and pt agreeable to plan of care and goals.    Anticipated barriers to physical therapy: none    Short Term Goals: 3-4 weeks:  1.  Patient will demonstrate ability to correct innominate rotation with SIJ METs.  2.  Patient will demonstrate ability to correctly zeke SI belt.  3. Patient will report <25% limitation on Lumbar FOTO survey    Plan   Continue POC.    Mariam Edouard, PTA

## 2018-10-18 ENCOUNTER — ROUTINE PRENATAL (OUTPATIENT)
Dept: OBSTETRICS AND GYNECOLOGY | Facility: CLINIC | Age: 28
End: 2018-10-18
Payer: COMMERCIAL

## 2018-10-18 ENCOUNTER — TELEPHONE (OUTPATIENT)
Dept: OBSTETRICS AND GYNECOLOGY | Facility: CLINIC | Age: 28
End: 2018-10-18

## 2018-10-18 VITALS
WEIGHT: 213.88 LBS | DIASTOLIC BLOOD PRESSURE: 64 MMHG | SYSTOLIC BLOOD PRESSURE: 100 MMHG | BODY MASS INDEX: 33.49 KG/M2

## 2018-10-18 DIAGNOSIS — Z3A.34 34 WEEKS GESTATION OF PREGNANCY: ICD-10-CM

## 2018-10-18 DIAGNOSIS — O26.843 UTERINE SIZE-DATE DISCREPANCY IN THIRD TRIMESTER: Primary | ICD-10-CM

## 2018-10-18 PROCEDURE — 0502F SUBSEQUENT PRENATAL CARE: CPT | Mod: S$GLB,,, | Performed by: OBSTETRICS & GYNECOLOGY

## 2018-10-18 PROCEDURE — 99999 PR PBB SHADOW E&M-EST. PATIENT-LVL III: CPT | Mod: PBBFAC,,, | Performed by: OBSTETRICS & GYNECOLOGY

## 2018-10-18 NOTE — TELEPHONE ENCOUNTER
SAJI AND ASHA:  PLEASE SEND DATE AND TIME TO [facility] TO PUT ON GOOGLE AND EPIC AND TO FELICE TO PUT ON OB LIST  DON'T FORGET TO CALL PT AND LET HER KNOW ALSO#####          Procedure: answer Y where needed       Induction     Pitocin_____     Cytotec____     Balloon____     Other______         Primary____      Repeat__x__    BTL _____________    Cerclage _________       Indication (elective/other)IUP at 39 weeks   Prev cs    Date desired 18  Time desired 07  (or7)    Due Date     Cervical exam- (inductions only)   Dilation:   Effacement:   Station:   Consistency:   Position:      HOLGUIN SCORE____________

## 2018-10-22 ENCOUNTER — CLINICAL SUPPORT (OUTPATIENT)
Dept: REHABILITATION | Facility: HOSPITAL | Age: 28
End: 2018-10-22
Payer: COMMERCIAL

## 2018-10-22 DIAGNOSIS — O99.891 SACROILIAC PAIN DURING PREGNANCY: ICD-10-CM

## 2018-10-22 DIAGNOSIS — M53.3 SACROILIAC PAIN DURING PREGNANCY: ICD-10-CM

## 2018-10-22 PROCEDURE — 97110 THERAPEUTIC EXERCISES: CPT | Mod: PN

## 2018-10-22 NOTE — PROGRESS NOTES
"  Physical Therapy Daily Treatment Note     Name: Leydi Matias  Clinic Number: [id number]    Therapy Diagnosis:   Encounter Diagnosis   Name Primary?    Sacroiliac pain during pregnancy      Physician: Robert Shields MD    Visit Date: 10/22/2018    PROCEDURES:     Time In: 1600  Time Out: 1635     TIMED  Procedure Min.   Therex  35            Total Timed Minutes: 2  Total Timed Units:  2  Total Untimed Units:  -  Charges Billed/# of units:  TE-2    Subjective   Leydi presents to PT today with primary complaint of L-sided LBP/SIJ pain.  States that she feels like her 'L-side is out today'. She states that she is now 34 weeks pregnant.      She was compliant with home exercise program.  Response to previous treatment: no adverse effects  Functional change: none   Pain: 4/10 Location: L SIJ area    Objective   O:  Supine-to-sit test LLD test:  L anterior innominate rotation.  Correctable with SIJ METs.  1 trial of manual resistance in supine for SIJ MET.     Leydi received therapeutic exercises to develop strength, ROM and posture for 35 minutes including:  See log below       DATE 10/22/18   VISIT 9   FOTO 9/10        hooklying hip add isos Seated 20x5"   hoolying hip abd isos Seated 20x5"   hooklying hip abd/add isos Seated 20x5"   SLR 3x10 L   Bridges 3x10   Push-pull (sitting) 4 reps   Seated heel press-downs Seated 20x5"   Leg Press HOLD at this time        INITIALS JH         Home Exercises Provided and Patient Education Provided   Education provided:   - Cont Hep    Written Home Exercises Provided: Patient instructed to cont prior HEP.  Exercises were reviewed and Leydi was able to demonstrate them prior to the end of the session.  Leydi demonstrated good  understanding of the education provided.   See EMR under Patient Instructions for exercises provided prior visit.    Assessment   A: SIJ dysfunction; correctable innominate rotation with METs.  SIJ dysfunction secondary to pregnancy.  Discontinue " strong abdominal contractions at this time. Wearing SI belt throughout most of her day.     Leydi is progressing well towards her goals.   Pt prognosis is Excellent.     Pt will continue to benefit from skilled outpatient physical therapy to address the deficits listed in the problem list box on initial evaluation, provide pt/family education and to maximize pt's level of independence in the home and community environment.   Pt's spiritual, cultural and educational needs considered and pt agreeable to plan of care and goals.    Anticipated barriers to physical therapy: none    Short Term Goals: 3-4 weeks:  1.  Patient will demonstrate ability to correct innominate rotation with SIJ METs.  MET  2.  Patient will demonstrate ability to correctly zeke SI belt.  3. Patient will report <25% limitation on Lumbar FOTO survey    Plan   Continue POC.    Yrn Lowe, PT

## 2018-10-23 ENCOUNTER — DOCUMENTATION ONLY (OUTPATIENT)
Dept: REHABILITATION | Facility: HOSPITAL | Age: 28
End: 2018-10-23

## 2018-10-23 DIAGNOSIS — M53.3 SACROILIAC PAIN DURING PREGNANCY: ICD-10-CM

## 2018-10-23 DIAGNOSIS — O99.891 SACROILIAC PAIN DURING PREGNANCY: ICD-10-CM

## 2018-10-23 NOTE — PROGRESS NOTES
Face to Face PTA Conference performed with Annabelle Ochoa PTA and Mariam Edouard PTA regarding patient's current status, overall progress, and plan of care    Yrn Lowe, PT, DPT    Face to face meeting completed with Yrn Lowe PT regarding current status and progress of   Leydi Matias .  Mariam Edouard PTA    Face to face meeting completed with Yrn Lowe PT regarding current status and progress of   Leydi Florencio .  Annabelle Ochoa PTA

## 2018-10-24 ENCOUNTER — CLINICAL SUPPORT (OUTPATIENT)
Dept: REHABILITATION | Facility: HOSPITAL | Age: 28
End: 2018-10-24
Payer: COMMERCIAL

## 2018-10-24 DIAGNOSIS — O99.891 SACROILIAC PAIN DURING PREGNANCY: ICD-10-CM

## 2018-10-24 DIAGNOSIS — M53.3 SACROILIAC PAIN DURING PREGNANCY: ICD-10-CM

## 2018-10-24 PROCEDURE — 97110 THERAPEUTIC EXERCISES: CPT | Mod: PN

## 2018-10-24 NOTE — PROGRESS NOTES
"  Physical Therapy Daily Treatment Note     Name: Leydi Matias  Clinic Number: [id number]    Therapy Diagnosis:   Encounter Diagnosis   Name Primary?    Sacroiliac pain during pregnancy      Physician: Robert Shields MD    Visit Date: 10/24/2018    Start Time:  4:00  Stop Time:  4:45     PROCEDURES:     TIMED  Procedure Min.   TE 45                                                                     UNTIMED  Procedure Min.                  Total Timed Minutes:  45  Total Timed Units:  3  Total Untimed Units:  0  Charges Billed/# of units:  TE-3    Subjective     Pt reports: increase in back pain today on her L side, as well and R anterior thigh pain. Pt stated pain comes and goes and she has been careful not to do anything to make her sxs worse. Pt stated she had to sit through her last two hours at work.   She was compliant with home exercise program.  Response to previous treatment: no adverse effects  Functional change: none    Pain: 4/10  Location: L SIJ area    Objective     Leydi received therapeutic exercises to develop strength, ROM and posture for 45 minutes including:  Correctable L anterior innominate rotation with SIJ METS seated.      DATE 10/24/18 10/22/18   VISIT 10 9   FOTO 10/10 DONE 9/10          hooklying hip add isos Seated 20x5" Seated 20x5"   hoolying hip abd isos Seated 20x5" Seated 20x5"   hooklying hip abd/add isos Seated 20x5" Seated 20x5"   SLR 3x10 L 3x10 L   Bridges 3x10 3x10   Push-pull (sitting) 4 reps 4 reps   Seated heel press-downs Seated 20x5" Seated 20x5"   Leg Press NT HOLD at this time          INITIALS AC 1/6 JH       Home Exercises Provided and Patient Education Provided     Education provided:   - Continue HEP  Written Home Exercises Provided: Patient instructed to cont prior HEP.  Exercises were reviewed and Leydi was able to demonstrate them prior to the end of the session.  Leydi demonstrated good  understanding of the education provided.     See EMR under Patient " Instructions for exercises provided prior visit.    Assessment   Correctable innominate rotation with METs. Pt able to complete session with report of decrease in pain towards completion.   Leydi is progressing well towards her goals.   Pt prognosis is Good.     Pt will continue to benefit from skilled outpatient physical therapy to address the deficits listed in the problem list box on initial evaluation, provide pt/family education and to maximize pt's level of independence in the home and community environment.   Pt's spiritual, cultural and educational needs considered and pt agreeable to plan of care and goals.    Anticipated barriers to physical therapy: None    Short Term Goals: 3-4 weeks:  1.  Patient will demonstrate ability to correct innominate rotation with SIJ METs.  MET  2.  Patient will demonstrate ability to correctly zeke SI belt.  3. Patient will report <25% limitation on Lumbar FOTO survey    Plan   Continue POC. Progress as able.    Mariam Edouard, PTA

## 2018-10-29 ENCOUNTER — TELEPHONE (OUTPATIENT)
Dept: OBSTETRICS AND GYNECOLOGY | Facility: CLINIC | Age: 28
End: 2018-10-29

## 2018-10-29 ENCOUNTER — CLINICAL SUPPORT (OUTPATIENT)
Dept: REHABILITATION | Facility: HOSPITAL | Age: 28
End: 2018-10-29
Payer: COMMERCIAL

## 2018-10-29 DIAGNOSIS — M53.3 SACROILIAC PAIN DURING PREGNANCY: ICD-10-CM

## 2018-10-29 DIAGNOSIS — O99.891 SACROILIAC PAIN DURING PREGNANCY: ICD-10-CM

## 2018-10-29 PROCEDURE — 97110 THERAPEUTIC EXERCISES: CPT | Mod: PN

## 2018-10-29 NOTE — PROGRESS NOTES
"  Physical Therapy Daily Treatment Note     Name: Leydi Matias  Clinic Number: [id number]    Therapy Diagnosis:   Encounter Diagnosis   Name Primary?    Sacroiliac pain during pregnancy      Physician: Robert Shields MD    Visit Date: 10/29/2018    PROCEDURES:     Time In: 1600   Time Out: 1630     TIMED  Procedure Min.   Therex  30            Total Timed Minutes:  30  Total Timed Units:  2  Total Untimed Units:  -  Charges Billed/# of units:  2    Subjective   Leydi presents to PT today with no complaint of lumbosacral pain. She states that she is now 36 weeks pregnant.      She was compliant with home exercise program.  Response to previous treatment: no adverse effects  Functional change: none   Pain: 0/10 Location: L SIJ area    Objective   O:  Supine-to-sit test LLD test:  Slight L anterior innominate rotation.  Correctable with SIJ METs.      Leydi received therapeutic exercises to develop strength, ROM and posture for 30 minutes including:  See log below       DATE 10/29/2018   VISIT 11   FOTO At dc        hooklying hip add isos Seated 20x5"   hoolying hip abd isos Seated 20x5"   hooklying hip abd/add isos Seated 20x5"   SLR 3x10 L   Bridges 3x10   Push-pull (sitting) 4 reps   Seated heel press-downs Seated 20x5"   Leg Press HOLD at this time        INITIALS JH         Home Exercises Provided and Patient Education Provided   Education provided:   - Cont Hep    Written Home Exercises Provided: Patient instructed to cont prior HEP.  Exercises were reviewed and Leydi was able to demonstrate them prior to the end of the session.  Leydi demonstrated good  understanding of the education provided.   See EMR under Patient Instructions for exercises provided prior visit.    Assessment   A: SIJ dysfunction; correctable innominate rotation with METs.  SIJ dysfunction secondary to pregnancy.  Discontinue strong abdominal contractions at this time. Wearing SI belt throughout most of her day.     Leydi is " progressing well towards her goals.   Pt prognosis is Excellent.     Pt will continue to benefit from skilled outpatient physical therapy to address the deficits listed in the problem list box on initial evaluation, provide pt/family education and to maximize pt's level of independence in the home and community environment.   Pt's spiritual, cultural and educational needs considered and pt agreeable to plan of care and goals.    Anticipated barriers to physical therapy: none    Short Term Goals: 3-4 weeks:  1.  Patient will demonstrate ability to correct innominate rotation with SIJ METs.  MET  2.  Patient will demonstrate ability to correctly zeke SI belt.  3. Patient will report <25% limitation on Lumbar FOTO survey    Plan   Continue POC. Nearing discharge due to pregnancy.     Yrn Lowe, PT

## 2018-10-29 NOTE — TELEPHONE ENCOUNTER
Pt is 36 weeks and does not have an appt scheduled until next week but pt says Dr Shields told her she wanted to start checking her at 35 weeks. Pt wants to make sure she doesn't need to come in this week. Also, pt needs note saying she is returning to work from C/s on 02/11/19.

## 2018-10-31 ENCOUNTER — ROUTINE PRENATAL (OUTPATIENT)
Dept: OBSTETRICS AND GYNECOLOGY | Facility: CLINIC | Age: 28
End: 2018-10-31
Attending: OBSTETRICS & GYNECOLOGY
Payer: COMMERCIAL

## 2018-10-31 ENCOUNTER — LAB VISIT (OUTPATIENT)
Dept: LAB | Facility: OTHER | Age: 28
End: 2018-10-31
Attending: OBSTETRICS & GYNECOLOGY
Payer: COMMERCIAL

## 2018-10-31 VITALS
DIASTOLIC BLOOD PRESSURE: 80 MMHG | SYSTOLIC BLOOD PRESSURE: 124 MMHG | WEIGHT: 213.88 LBS | BODY MASS INDEX: 33.49 KG/M2

## 2018-10-31 DIAGNOSIS — Z36.85 ANTENATAL SCREENING FOR STREPTOCOCCUS B: Primary | ICD-10-CM

## 2018-10-31 DIAGNOSIS — Z3A.36 36 WEEKS GESTATION OF PREGNANCY: ICD-10-CM

## 2018-10-31 PROCEDURE — 86703 HIV-1/HIV-2 1 RESULT ANTBDY: CPT

## 2018-10-31 PROCEDURE — 86592 SYPHILIS TEST NON-TREP QUAL: CPT

## 2018-10-31 PROCEDURE — 87081 CULTURE SCREEN ONLY: CPT

## 2018-10-31 PROCEDURE — 36415 COLL VENOUS BLD VENIPUNCTURE: CPT

## 2018-10-31 PROCEDURE — 99999 PR PBB SHADOW E&M-EST. PATIENT-LVL II: CPT | Mod: PBBFAC,,, | Performed by: OBSTETRICS & GYNECOLOGY

## 2018-10-31 PROCEDURE — 0502F SUBSEQUENT PRENATAL CARE: CPT | Mod: S$GLB,,, | Performed by: OBSTETRICS & GYNECOLOGY

## 2018-10-31 NOTE — PROGRESS NOTES
No c/o Doing well Still with pelvic/back pain but much better since going to PT U/s next visit  GBBS done and Labs today   Consents next week

## 2018-11-01 LAB
HIV 1+2 AB+HIV1 P24 AG SERPL QL IA: NEGATIVE
RPR SER QL: NORMAL

## 2018-11-02 LAB — BACTERIA SPEC AEROBE CULT: NORMAL

## 2018-11-04 ENCOUNTER — PATIENT MESSAGE (OUTPATIENT)
Dept: OBSTETRICS AND GYNECOLOGY | Facility: CLINIC | Age: 28
End: 2018-11-04

## 2018-11-05 ENCOUNTER — LAB VISIT (OUTPATIENT)
Dept: LAB | Facility: HOSPITAL | Age: 28
End: 2018-11-05
Attending: NURSE PRACTITIONER
Payer: COMMERCIAL

## 2018-11-05 ENCOUNTER — ROUTINE PRENATAL (OUTPATIENT)
Dept: OBSTETRICS AND GYNECOLOGY | Facility: CLINIC | Age: 28
End: 2018-11-05
Payer: COMMERCIAL

## 2018-11-05 VITALS
DIASTOLIC BLOOD PRESSURE: 62 MMHG | SYSTOLIC BLOOD PRESSURE: 118 MMHG | BODY MASS INDEX: 34.03 KG/M2 | WEIGHT: 217.25 LBS

## 2018-11-05 DIAGNOSIS — L29.9 PRURITUS OF PREGNANCY IN THIRD TRIMESTER: ICD-10-CM

## 2018-11-05 DIAGNOSIS — O99.713 PRURITUS OF PREGNANCY IN THIRD TRIMESTER: Primary | ICD-10-CM

## 2018-11-05 DIAGNOSIS — Z3A.37 37 WEEKS GESTATION OF PREGNANCY: ICD-10-CM

## 2018-11-05 DIAGNOSIS — O99.713 PRURITUS OF PREGNANCY IN THIRD TRIMESTER: ICD-10-CM

## 2018-11-05 DIAGNOSIS — L29.9 PRURITUS OF PREGNANCY IN THIRD TRIMESTER: Primary | ICD-10-CM

## 2018-11-05 LAB
ALBUMIN SERPL BCP-MCNC: 2.7 G/DL
ALP SERPL-CCNC: 129 U/L
ALT SERPL W/O P-5'-P-CCNC: 13 U/L
ANION GAP SERPL CALC-SCNC: 7 MMOL/L
AST SERPL-CCNC: 22 U/L
BILIRUB SERPL-MCNC: 0.3 MG/DL
BUN SERPL-MCNC: 9 MG/DL
CALCIUM SERPL-MCNC: 8.6 MG/DL
CHLORIDE SERPL-SCNC: 106 MMOL/L
CO2 SERPL-SCNC: 23 MMOL/L
CREAT SERPL-MCNC: 0.7 MG/DL
EST. GFR  (AFRICAN AMERICAN): >60 ML/MIN/1.73 M^2
EST. GFR  (NON AFRICAN AMERICAN): >60 ML/MIN/1.73 M^2
GLUCOSE SERPL-MCNC: 72 MG/DL
POTASSIUM SERPL-SCNC: 3.5 MMOL/L
PROT SERPL-MCNC: 6 G/DL
SODIUM SERPL-SCNC: 136 MMOL/L
URATE SERPL-MCNC: 5.4 MG/DL

## 2018-11-05 PROCEDURE — 99999 PR PBB SHADOW E&M-EST. PATIENT-LVL III: CPT | Mod: PBBFAC,,, | Performed by: NURSE PRACTITIONER

## 2018-11-05 PROCEDURE — 0502F SUBSEQUENT PRENATAL CARE: CPT | Mod: S$GLB,,, | Performed by: NURSE PRACTITIONER

## 2018-11-05 PROCEDURE — 82239 BILE ACIDS TOTAL: CPT

## 2018-11-05 PROCEDURE — 80053 COMPREHEN METABOLIC PANEL: CPT

## 2018-11-05 PROCEDURE — 84550 ASSAY OF BLOOD/URIC ACID: CPT

## 2018-11-05 NOTE — PROGRESS NOTES
Here today for c/o itching to abdomen that began 2 days ago.  By yesterday, when she went to shower, she noticed red bumpy rash to entire abdomen, upper arms, low back, and tops of legs; back itching as well ( stated she had rash all over her back last night too.)  Today, rash is present on abdomen, and is predominantly noticeable to striae near lower potion of abdomen, but still present to almost entire abdomen, except around umbilicus.  Scant amount of rash noted to top of ankles.    Denies itching or rash to palms of bernard or soles of feet.  Despite this, I d/w patient that we will get labs to check bile acids (r/o cholestasis), although this does not appear to be the case.    Recommended she use Benadryl prn itching; may also use Sarna anti-itch cream, and/or topical cortisone cream.  Aveeno oatmeal baths, cool compresses, and not scratching will also help.   She reports good FM, and denies any other complaints.  Will keep appt with [facility] on Thursday this week to reassess rash.  Labor/bleeding/decreased FM prec.

## 2018-11-05 NOTE — LETTER
November 5, 2018    Leydi Matias  [address]  [place] [address]         [facility]  [address] [facility] [address]  Phone: [phone]  Fax: [phone] November 5, 2018     Patient: Leydi Matias   YOB: 1990   Date of Visit: 11/5/2018       To Whom It May Concern:    Leydi Matias will be on maternity leave beginning November 19-January 11, 2019.    If you have any questions or concerns, please don't hesitate to call.    Sincerely,        Robert Shields M.D.

## 2018-11-07 LAB — BILE AC SERPL-SCNC: 5 MCMOL/L

## 2018-11-08 ENCOUNTER — ROUTINE PRENATAL (OUTPATIENT)
Dept: OBSTETRICS AND GYNECOLOGY | Facility: CLINIC | Age: 28
End: 2018-11-08
Payer: COMMERCIAL

## 2018-11-08 ENCOUNTER — PROCEDURE VISIT (OUTPATIENT)
Dept: OBSTETRICS AND GYNECOLOGY | Facility: CLINIC | Age: 28
End: 2018-11-08
Payer: COMMERCIAL

## 2018-11-08 ENCOUNTER — TELEPHONE (OUTPATIENT)
Dept: REHABILITATION | Facility: HOSPITAL | Age: 28
End: 2018-11-08

## 2018-11-08 VITALS
WEIGHT: 213.88 LBS | BODY MASS INDEX: 33.49 KG/M2 | DIASTOLIC BLOOD PRESSURE: 66 MMHG | SYSTOLIC BLOOD PRESSURE: 102 MMHG

## 2018-11-08 DIAGNOSIS — O26.843 UTERINE SIZE-DATE DISCREPANCY IN THIRD TRIMESTER: ICD-10-CM

## 2018-11-08 DIAGNOSIS — Z3A.37 37 WEEKS GESTATION OF PREGNANCY: Primary | ICD-10-CM

## 2018-11-08 PROCEDURE — 0502F SUBSEQUENT PRENATAL CARE: CPT | Mod: S$GLB,,, | Performed by: OBSTETRICS & GYNECOLOGY

## 2018-11-08 PROCEDURE — 76816 OB US FOLLOW-UP PER FETUS: CPT | Mod: S$GLB,,, | Performed by: OBSTETRICS & GYNECOLOGY

## 2018-11-08 PROCEDURE — 99999 PR PBB SHADOW E&M-EST. PATIENT-LVL II: CPT | Mod: PBBFAC,,, | Performed by: OBSTETRICS & GYNECOLOGY

## 2018-11-08 NOTE — PROGRESS NOTES
Priyank Holley,   Your labs were all normal!  How are you feeling?  Any improvement with the rash and itching?  Let me know if you need anything.    Have a good night,   BENNY Lin

## 2018-11-11 NOTE — PROGRESS NOTES
Pt desires to proceed with repeat LTCS sched on 11/19   Starting Valtex for prophy today  Consents signed today

## 2018-11-15 ENCOUNTER — ROUTINE PRENATAL (OUTPATIENT)
Dept: OBSTETRICS AND GYNECOLOGY | Facility: CLINIC | Age: 28
End: 2018-11-15
Payer: COMMERCIAL

## 2018-11-15 VITALS
WEIGHT: 217.38 LBS | SYSTOLIC BLOOD PRESSURE: 120 MMHG | DIASTOLIC BLOOD PRESSURE: 76 MMHG | BODY MASS INDEX: 34.05 KG/M2

## 2018-11-15 DIAGNOSIS — Z34.83 ENCOUNTER FOR SUPERVISION OF OTHER NORMAL PREGNANCY, THIRD TRIMESTER: ICD-10-CM

## 2018-11-15 DIAGNOSIS — Z3A.38 38 WEEKS GESTATION OF PREGNANCY: Primary | ICD-10-CM

## 2018-11-15 PROCEDURE — 0502F SUBSEQUENT PRENATAL CARE: CPT | Mod: S$GLB,,, | Performed by: NURSE PRACTITIONER

## 2018-11-15 PROCEDURE — 99999 PR PBB SHADOW E&M-EST. PATIENT-LVL II: CPT | Mod: PBBFAC,,, | Performed by: NURSE PRACTITIONER

## 2018-11-15 NOTE — PROGRESS NOTES
Doing well; no complaints.  Reports fetal movement.  All questions answered.    Labor/bleeding/decreased fetal movement precautions given.  No SVE done; denies ctx's.  Pt feels great & excited for scheduled c/s Mon. 11/19!  Consents done already.  We discussed constipation and pt currently taking Colace only once daily.  Dose is 1-3 gel capsules per day, and since her BM's are not every day, I recommended she take 3/day with plenty water.  Also suggested that she take Miralax 1-2x/daily in the few days leading up to c/s, until her BM's are normal (daily) and regular consistency.  Pt advised NPO after 11pm Sunday.  Discussed rooming-in, PP care, breastfeeding.

## 2018-11-19 ENCOUNTER — HOSPITAL ENCOUNTER (INPATIENT)
Facility: OTHER | Age: 28
LOS: 2 days | Discharge: HOME | End: 2018-11-21
Attending: OBSTETRICS & GYNECOLOGY | Admitting: OBSTETRICS & GYNECOLOGY
Payer: COMMERCIAL

## 2018-11-19 ENCOUNTER — ANESTHESIA EVENT (OUTPATIENT)
Dept: OBSTETRICS AND GYNECOLOGY | Facility: OTHER | Age: 28
End: 2018-11-19
Payer: COMMERCIAL

## 2018-11-19 ENCOUNTER — ANESTHESIA (OUTPATIENT)
Dept: OBSTETRICS AND GYNECOLOGY | Facility: OTHER | Age: 28
End: 2018-11-19
Payer: COMMERCIAL

## 2018-11-19 DIAGNOSIS — O34.219 PREVIOUS CESAREAN DELIVERY AFFECTING PREGNANCY: ICD-10-CM

## 2018-11-19 DIAGNOSIS — Z3A.39 39 WEEKS GESTATION OF PREGNANCY: Primary | ICD-10-CM

## 2018-11-19 LAB
ABO + RH BLD: NORMAL
BASOPHILS # BLD AUTO: 0.01 K/UL
BASOPHILS NFR BLD: 0.2 %
BLD GP AB SCN CELLS X3 SERPL QL: NORMAL
DIFFERENTIAL METHOD: ABNORMAL
EOSINOPHIL # BLD AUTO: 0.1 K/UL
EOSINOPHIL NFR BLD: 1.8 %
ERYTHROCYTE [DISTWIDTH] IN BLOOD BY AUTOMATED COUNT: 18.6 %
HCT VFR BLD AUTO: 34.5 %
HGB BLD-MCNC: 10.5 G/DL
LYMPHOCYTES # BLD AUTO: 1.8 K/UL
LYMPHOCYTES NFR BLD: 32.4 %
MCH RBC QN AUTO: 24 PG
MCHC RBC AUTO-ENTMCNC: 30.4 G/DL
MCV RBC AUTO: 79 FL
MONOCYTES # BLD AUTO: 0.3 K/UL
MONOCYTES NFR BLD: 4.9 %
NEUTROPHILS # BLD AUTO: 3.4 K/UL
NEUTROPHILS NFR BLD: 60.7 %
PLATELET # BLD AUTO: 135 K/UL
PLATELET BLD QL SMEAR: ABNORMAL
PMV BLD AUTO: 10.7 FL
POLYCHROMASIA BLD QL SMEAR: ABNORMAL
RBC # BLD AUTO: 4.37 M/UL
WBC # BLD AUTO: 5.56 K/UL

## 2018-11-19 PROCEDURE — 11000001 HC ACUTE MED/SURG PRIVATE ROOM

## 2018-11-19 PROCEDURE — 25000003 PHARM REV CODE 250: Performed by: OBSTETRICS & GYNECOLOGY

## 2018-11-19 PROCEDURE — 59514 CESAREAN DELIVERY ONLY: CPT | Mod: 82,,, | Performed by: OBSTETRICS & GYNECOLOGY

## 2018-11-19 PROCEDURE — 27200688 HC TRAY, SPINAL-HYPER/ ISOBARIC: Performed by: STUDENT IN AN ORGANIZED HEALTH CARE EDUCATION/TRAINING PROGRAM

## 2018-11-19 PROCEDURE — 51702 INSERT TEMP BLADDER CATH: CPT

## 2018-11-19 PROCEDURE — 37000009 HC ANESTHESIA EA ADD 15 MINS: Performed by: OBSTETRICS & GYNECOLOGY

## 2018-11-19 PROCEDURE — 63600175 PHARM REV CODE 636 W HCPCS: Performed by: OBSTETRICS & GYNECOLOGY

## 2018-11-19 PROCEDURE — 37000008 HC ANESTHESIA 1ST 15 MINUTES: Performed by: OBSTETRICS & GYNECOLOGY

## 2018-11-19 PROCEDURE — 36000685 HC CESAREAN SECTION LEVEL I

## 2018-11-19 PROCEDURE — 63600175 PHARM REV CODE 636 W HCPCS: Performed by: STUDENT IN AN ORGANIZED HEALTH CARE EDUCATION/TRAINING PROGRAM

## 2018-11-19 PROCEDURE — 25000003 PHARM REV CODE 250: Performed by: STUDENT IN AN ORGANIZED HEALTH CARE EDUCATION/TRAINING PROGRAM

## 2018-11-19 PROCEDURE — 86901 BLOOD TYPING SEROLOGIC RH(D): CPT

## 2018-11-19 PROCEDURE — 59510 CESAREAN DELIVERY: CPT | Mod: ,,, | Performed by: ANESTHESIOLOGY

## 2018-11-19 PROCEDURE — 85025 COMPLETE CBC W/AUTO DIFF WBC: CPT

## 2018-11-19 PROCEDURE — 59510 CESAREAN DELIVERY: CPT | Mod: ,,, | Performed by: OBSTETRICS & GYNECOLOGY

## 2018-11-19 RX ORDER — ACETAMINOPHEN 325 MG/1
650 TABLET ORAL EVERY 6 HOURS
Status: DISPENSED | OUTPATIENT
Start: 2018-11-19 | End: 2018-11-20

## 2018-11-19 RX ORDER — CEFAZOLIN SODIUM 1 G/3ML
INJECTION, POWDER, FOR SOLUTION INTRAMUSCULAR; INTRAVENOUS
Status: DISCONTINUED | OUTPATIENT
Start: 2018-11-19 | End: 2018-11-19

## 2018-11-19 RX ORDER — SODIUM CHLORIDE, SODIUM LACTATE, POTASSIUM CHLORIDE, CALCIUM CHLORIDE 600; 310; 30; 20 MG/100ML; MG/100ML; MG/100ML; MG/100ML
INJECTION, SOLUTION INTRAVENOUS CONTINUOUS
Status: DISCONTINUED | OUTPATIENT
Start: 2018-11-19 | End: 2018-11-21 | Stop reason: HOSPADM

## 2018-11-19 RX ORDER — ONDANSETRON 8 MG/1
8 TABLET, ORALLY DISINTEGRATING ORAL EVERY 8 HOURS PRN
Status: DISCONTINUED | OUTPATIENT
Start: 2018-11-19 | End: 2018-11-21 | Stop reason: HOSPADM

## 2018-11-19 RX ORDER — OXYCODONE HYDROCHLORIDE 5 MG/1
5 TABLET ORAL EVERY 4 HOURS PRN
Status: DISCONTINUED | OUTPATIENT
Start: 2018-11-19 | End: 2018-11-19 | Stop reason: SDUPTHER

## 2018-11-19 RX ORDER — OXYTOCIN/RINGER'S LACTATE 20/1000 ML
41.7 PLASTIC BAG, INJECTION (ML) INTRAVENOUS CONTINUOUS
Status: DISPENSED | OUTPATIENT
Start: 2018-11-19 | End: 2018-11-19

## 2018-11-19 RX ORDER — FENTANYL CITRATE 50 UG/ML
INJECTION, SOLUTION INTRAMUSCULAR; INTRAVENOUS
Status: DISCONTINUED | OUTPATIENT
Start: 2018-11-19 | End: 2018-11-19

## 2018-11-19 RX ORDER — SODIUM CHLORIDE, SODIUM LACTATE, POTASSIUM CHLORIDE, CALCIUM CHLORIDE 600; 310; 30; 20 MG/100ML; MG/100ML; MG/100ML; MG/100ML
INJECTION, SOLUTION INTRAVENOUS CONTINUOUS PRN
Status: DISCONTINUED | OUTPATIENT
Start: 2018-11-19 | End: 2018-11-19

## 2018-11-19 RX ORDER — OXYTOCIN 10 [USP'U]/ML
INJECTION, SOLUTION INTRAMUSCULAR; INTRAVENOUS
Status: DISCONTINUED | OUTPATIENT
Start: 2018-11-19 | End: 2018-11-19

## 2018-11-19 RX ORDER — KETOROLAC TROMETHAMINE 30 MG/ML
INJECTION, SOLUTION INTRAMUSCULAR; INTRAVENOUS
Status: DISCONTINUED | OUTPATIENT
Start: 2018-11-19 | End: 2018-11-19

## 2018-11-19 RX ORDER — MUPIROCIN 20 MG/G
OINTMENT TOPICAL
Status: DISCONTINUED | OUTPATIENT
Start: 2018-11-19 | End: 2018-11-19

## 2018-11-19 RX ORDER — MORPHINE SULFATE 0.5 MG/ML
INJECTION, SOLUTION EPIDURAL; INTRATHECAL; INTRAVENOUS
Status: DISCONTINUED | OUTPATIENT
Start: 2018-11-19 | End: 2018-11-19

## 2018-11-19 RX ORDER — MISOPROSTOL 200 UG/1
TABLET ORAL
Status: DISPENSED
Start: 2018-11-19 | End: 2018-11-19

## 2018-11-19 RX ORDER — OXYCODONE HYDROCHLORIDE 5 MG/1
10 TABLET ORAL EVERY 4 HOURS PRN
Status: ACTIVE | OUTPATIENT
Start: 2018-11-19 | End: 2018-11-20

## 2018-11-19 RX ORDER — ACETAMINOPHEN 10 MG/ML
INJECTION, SOLUTION INTRAVENOUS
Status: DISCONTINUED | OUTPATIENT
Start: 2018-11-19 | End: 2018-11-19

## 2018-11-19 RX ORDER — DIPHENHYDRAMINE HYDROCHLORIDE 50 MG/ML
25 INJECTION INTRAMUSCULAR; INTRAVENOUS EVERY 6 HOURS PRN
Status: DISCONTINUED | OUTPATIENT
Start: 2018-11-19 | End: 2018-11-21 | Stop reason: HOSPADM

## 2018-11-19 RX ORDER — VALACYCLOVIR HYDROCHLORIDE 500 MG/1
500 TABLET, FILM COATED ORAL 2 TIMES DAILY
Status: DISCONTINUED | OUTPATIENT
Start: 2018-11-19 | End: 2018-11-21 | Stop reason: HOSPADM

## 2018-11-19 RX ORDER — ONDANSETRON 2 MG/ML
INJECTION INTRAMUSCULAR; INTRAVENOUS
Status: DISCONTINUED | OUTPATIENT
Start: 2018-11-19 | End: 2018-11-19

## 2018-11-19 RX ORDER — KETOROLAC TROMETHAMINE 30 MG/ML
30 INJECTION, SOLUTION INTRAMUSCULAR; INTRAVENOUS EVERY 6 HOURS
Status: DISCONTINUED | OUTPATIENT
Start: 2018-11-19 | End: 2018-11-19 | Stop reason: SDUPTHER

## 2018-11-19 RX ORDER — SODIUM CITRATE AND CITRIC ACID MONOHYDRATE 334; 500 MG/5ML; MG/5ML
30 SOLUTION ORAL
Status: DISCONTINUED | OUTPATIENT
Start: 2018-11-19 | End: 2018-11-21 | Stop reason: HOSPADM

## 2018-11-19 RX ORDER — PHENYLEPHRINE HYDROCHLORIDE 10 MG/ML
INJECTION INTRAVENOUS
Status: DISCONTINUED | OUTPATIENT
Start: 2018-11-19 | End: 2018-11-19

## 2018-11-19 RX ORDER — KETOROLAC TROMETHAMINE 30 MG/ML
30 INJECTION, SOLUTION INTRAMUSCULAR; INTRAVENOUS EVERY 6 HOURS
Status: COMPLETED | OUTPATIENT
Start: 2018-11-19 | End: 2018-11-20

## 2018-11-19 RX ORDER — ONDANSETRON 2 MG/ML
4 INJECTION INTRAMUSCULAR; INTRAVENOUS EVERY 6 HOURS PRN
Status: DISCONTINUED | OUTPATIENT
Start: 2018-11-19 | End: 2018-11-21 | Stop reason: HOSPADM

## 2018-11-19 RX ORDER — DOCUSATE SODIUM 100 MG/1
100 CAPSULE, LIQUID FILLED ORAL 2 TIMES DAILY
Status: DISCONTINUED | OUTPATIENT
Start: 2018-11-19 | End: 2018-11-20 | Stop reason: SDUPTHER

## 2018-11-19 RX ORDER — POLYETHYLENE GLYCOL 3350 17 G/17G
17 POWDER, FOR SOLUTION ORAL DAILY
Status: DISCONTINUED | OUTPATIENT
Start: 2018-11-19 | End: 2018-11-21 | Stop reason: HOSPADM

## 2018-11-19 RX ORDER — OXYCODONE HYDROCHLORIDE 5 MG/1
5 TABLET ORAL EVERY 4 HOURS PRN
Status: ACTIVE | OUTPATIENT
Start: 2018-11-19 | End: 2018-11-20

## 2018-11-19 RX ORDER — OXYCODONE HYDROCHLORIDE 5 MG/1
10 TABLET ORAL EVERY 4 HOURS PRN
Status: DISCONTINUED | OUTPATIENT
Start: 2018-11-19 | End: 2018-11-19 | Stop reason: SDUPTHER

## 2018-11-19 RX ORDER — CEFAZOLIN SODIUM 2 G/50ML
2 SOLUTION INTRAVENOUS
Status: DISCONTINUED | OUTPATIENT
Start: 2018-11-19 | End: 2018-11-19

## 2018-11-19 RX ORDER — BUPIVACAINE HYDROCHLORIDE 7.5 MG/ML
INJECTION, SOLUTION INTRASPINAL
Status: DISCONTINUED | OUTPATIENT
Start: 2018-11-19 | End: 2018-11-19

## 2018-11-19 RX ORDER — ADHESIVE BANDAGE
30 BANDAGE TOPICAL DAILY PRN
Status: DISCONTINUED | OUTPATIENT
Start: 2018-11-19 | End: 2018-11-20 | Stop reason: SDUPTHER

## 2018-11-19 RX ORDER — NALBUPHINE HYDROCHLORIDE 10 MG/ML
10 INJECTION, SOLUTION INTRAMUSCULAR; INTRAVENOUS; SUBCUTANEOUS
Status: DISCONTINUED | OUTPATIENT
Start: 2018-11-19 | End: 2018-11-21 | Stop reason: HOSPADM

## 2018-11-19 RX ADMIN — KETOROLAC TROMETHAMINE 30 MG: 30 INJECTION, SOLUTION INTRAMUSCULAR; INTRAVENOUS at 07:11

## 2018-11-19 RX ADMIN — ACETAMINOPHEN 650 MG: 325 TABLET ORAL at 07:11

## 2018-11-19 RX ADMIN — OXYTOCIN 3 UNITS: 10 INJECTION, SOLUTION INTRAMUSCULAR; INTRAVENOUS at 07:11

## 2018-11-19 RX ADMIN — PHENYLEPHRINE HYDROCHLORIDE 100 MCG: 10 INJECTION INTRAVENOUS at 07:11

## 2018-11-19 RX ADMIN — SODIUM CHLORIDE, SODIUM LACTATE, POTASSIUM CHLORIDE, AND CALCIUM CHLORIDE 1000 ML: .6; .31; .03; .02 INJECTION, SOLUTION INTRAVENOUS at 06:11

## 2018-11-19 RX ADMIN — Medication 0.15 MG: at 07:11

## 2018-11-19 RX ADMIN — ACETAMINOPHEN 650 MG: 325 TABLET ORAL at 01:11

## 2018-11-19 RX ADMIN — VALACYCLOVIR HYDROCHLORIDE 500 MG: 500 TABLET, FILM COATED ORAL at 05:11

## 2018-11-19 RX ADMIN — SODIUM CHLORIDE, SODIUM LACTATE, POTASSIUM CHLORIDE, AND CALCIUM CHLORIDE: 600; 310; 30; 20 INJECTION, SOLUTION INTRAVENOUS at 07:11

## 2018-11-19 RX ADMIN — BUPIVACAINE HYDROCHLORIDE IN DEXTROSE 1.6 ML: 7.5 INJECTION, SOLUTION SUBARACHNOID at 07:11

## 2018-11-19 RX ADMIN — FENTANYL CITRATE 10 MCG: 50 INJECTION, SOLUTION INTRAMUSCULAR; INTRAVENOUS at 07:11

## 2018-11-19 RX ADMIN — DIPHENHYDRAMINE HYDROCHLORIDE 25 MG: 50 INJECTION, SOLUTION INTRAMUSCULAR; INTRAVENOUS at 04:11

## 2018-11-19 RX ADMIN — ACETAMINOPHEN 1000 MG: 10 INJECTION, SOLUTION INTRAVENOUS at 07:11

## 2018-11-19 RX ADMIN — KETOROLAC TROMETHAMINE 30 MG: 30 INJECTION, SOLUTION INTRAMUSCULAR at 01:11

## 2018-11-19 RX ADMIN — CEFAZOLIN 2 G: 330 INJECTION, POWDER, FOR SOLUTION INTRAMUSCULAR; INTRAVENOUS at 07:11

## 2018-11-19 RX ADMIN — ONDANSETRON 4 MG: 2 INJECTION INTRAMUSCULAR; INTRAVENOUS at 08:11

## 2018-11-19 RX ADMIN — ONDANSETRON 4 MG: 2 INJECTION INTRAMUSCULAR; INTRAVENOUS at 07:11

## 2018-11-19 RX ADMIN — SODIUM CHLORIDE, SODIUM LACTATE, POTASSIUM CHLORIDE, AND CALCIUM CHLORIDE: .6; .31; .03; .02 INJECTION, SOLUTION INTRAVENOUS at 11:11

## 2018-11-19 RX ADMIN — NALBUPHINE HYDROCHLORIDE 10 MG: 10 INJECTION, SOLUTION INTRAMUSCULAR; INTRAVENOUS; SUBCUTANEOUS at 07:11

## 2018-11-19 RX ADMIN — DOCUSATE SODIUM 100 MG: 100 CAPSULE, LIQUID FILLED ORAL at 07:11

## 2018-11-19 RX ADMIN — POLYETHYLENE GLYCOL 3350 17 G: 17 POWDER, FOR SOLUTION ORAL at 11:11

## 2018-11-19 RX ADMIN — PROMETHAZINE HYDROCHLORIDE 6.25 MG: 25 INJECTION INTRAMUSCULAR; INTRAVENOUS at 11:11

## 2018-11-19 RX ADMIN — SODIUM CITRATE AND CITRIC ACID MONOHYDRATE 30 ML: 500; 334 SOLUTION ORAL at 08:11

## 2018-11-19 RX ADMIN — KETOROLAC TROMETHAMINE 30 MG: 30 INJECTION, SOLUTION INTRAMUSCULAR at 07:11

## 2018-11-19 RX ADMIN — SODIUM CHLORIDE, SODIUM LACTATE, POTASSIUM CHLORIDE, AND CALCIUM CHLORIDE: 600; 310; 30; 20 INJECTION, SOLUTION INTRAVENOUS at 06:11

## 2018-11-19 NOTE — OPERATIVE NOTE ADDENDUM
Certification of Assistant at Surgery       Surgery Date: 2018     Participating Surgeons:  Surgeon(s) and Role:     * Robert Shields MD - Primary    Procedures:  Procedure(s) (LRB):   SECTION (N/A)    Assistant Surgeon's Certification of Necessity:  I understand that section 1842 (b) (6) (d) of the Social Security Act generally prohibits Medicare Part B reasonable charge payment for the services of assistants at surgery in [facility] hospitals when qualified residents are available to furnish such services. I certify that the services for which payment is claimed were medically necessary, and that no qualified resident was available to perform the services. I further understand that these services are subject to post-payment review by the Medicare carrier.    Residents were in protected time.    Stacey Nur MD    2018  7:42 AM

## 2018-11-19 NOTE — L&D DELIVERY NOTE
[facility]   Section   Operative Note    SUMMARY     Date of Procedure: 2018     Procedure: Procedure(s) (LRB):   SECTION (N/A)    Surgeon(s) and Role:     * Robert Shields MD - Primary    Assisting Surgeon: Stacey Solares MD  There was no qualified resident available at the time of the procedure.      Pre-Operative Diagnosis:   39 weeks gestation of pregnancy [Z3A.39]  Previous  section    Post-Operative Diagnosis: Post-Op Diagnosis Codes:     * 39 weeks gestation of pregnancy [Z3A.39]  Previous  section    Anesthesia: Spinal/Epidural     Description of the Findings of the Procedure:  Repeat low transverse  section        Complications: No    Blood Loss: 575 mL     With patient in supine position, the legs are  and Victoria Catheter placed and positioning to supine done.   Abdomen prepped with Chloroprep and 3 minute drying time allowed prior to draping of the abdomen.   Time out taken with OR team members.  Pfannenstiel Incision made through the skin, transverse fascial incision developed, rectus muscles  in the midline and the peritoneum entered.   no adhesions noted.  The lower uterine segment and position of the fetus identified.   Bladder flap taken down through transverse peritoneal incision.    Low Transverse Incision made through well developer lower uterine segment and extended laterally with blunt dissection.   Clear fluid noted.  Infant delivered from vertex presentation.  Cord clamped after one minute and  handed to attending nurse.  Cord blood taken, placenta delivered.  The uterus was exteriorized.  The edges of the uterine incision are grasped with Mosher clamps at the angles and the inferior and superior midline edges of the incision.    Closure with running lock 0 PDS with a second imbricating suture  Observation for bleeding with suture of any bleeding along the hysterotomy line.   With good hemostasis noted,  the anterior pelvis is rinsed with sterile saline.   Right and left adnexa with normal anatomy.     Closure of the abdomen with 2 0 Vicryl running of the peritoneum, fascial closure  PDS starting at #1 vicyl   Skin closure with 4 0 Vicryl subcuticular.  Wound dressed with pressure dressing         Specimens:   Specimen (12h ago, onward)    None          Condition: Good    Disposition: PACU - hemodynamically stable.    Attestation: Good         Delivery Information for  Flaco Matias    Birth information:  YOB: 2020   Time of birth: 7:19 AM   Sex: male   Head Delivery Date/Time: 2018  7:19 AM   Delivery type: , Low Transverse   Gestational Age: 39w0d    Delivery Providers    Delivering clinician:  Robert Shields MD   Provider Role    MD Olga Wild, RN     Delma Salgado, [facility]             Measurements    Weight:  3827 g  Length:  55.9 cm  Head circumference:  36.2 cm  Chest circumference:  35.6 cm         Apgars    Living status:  Living  Apgars:   1 min.:   5 min.:   10 min.:   15 min.:   20 min.:     Skin color:   1  1       Heart rate:   2  2       Reflex irritability:   2  2       Muscle tone:   2  2       Respiratory effort:   2  2       Total:   9  9       Apgars assigned by:  FAIZAN STILES         Operative Delivery    Forceps attempted?:  No  Vacuum extractor attempted?:  No         Shoulder Dystocia    Shoulder dystocia present?:  No           Presentation    Presentation:  Vertex  Position:  Left Occiput Anterior           Interventions/Resuscitation    Method:  Tactile Stimulation       Cord    Vessels:  3 vessels  Complications:  Nuchal  Nuchal Intervention:  reduced  Nuchal Cord Description:  loose nuchal cord  Number of Loops:  1  Delayed Cord Clamping?:  No  Cord Clamped Date/Time:  2018  7:19 AM  Cord Blood Disposition:  Sent with Baby  Gases Sent?:  No  Stem Cell Collection (by MD):  No       Placenta    Placenta delivery  date/time:  2018 0720  Placenta removal:  Manual removal  Placenta appearance:  Intact  Placenta disposition:  discarded           Labor Events:       labor: No     Labor Onset Date/Time:         Dilation Complete Date/Time:         Start Pushing Date/Time:       Rupture Date/Time:              Rupture type:           Fluid Amount:        Fluid Color:        Fluid Odor:        Membrane Status (PeriCalm):        Rupture Date/Time (PeriCalm):        Fluid Amount (PeriCalm):        Fluid Color (PeriCalm):         steroids: Full Course     Antibiotics given for GBS: No     Induction:       Indications for induction:        Augmentation:       Indications for augmentation:       Labor complications: None     Additional complications:          Cervical ripening:                     Delivery:      Episiotomy: None     Indication for Episiotomy:       Perineal Lacerations: None Repaired:      Periurethral Laceration:   Repaired:     Labial Laceration:   Repaired:     Sulcus Laceration:   Repaired:     Vaginal Laceration:   Repaired:     Cervical Laceration:   Repaired:     Repair suture:       Repair # of packets: 7     Vaginal delivery QBL (mL): 0      QBL (mL): 575     Combined Blood Loss (mL): 575     Vaginal Sweep Performed: No     Surgicount Correct: Yes       Other providers:       Anesthesia    Method:  Spinal          Details (if applicable):  Trial of Labor No    Categorization: Repeat    Priority: Routine   Indications for : Repeat Section   Incision Type: low transverse     Additional  information:  Forceps:    Vacuum:    Breech:    Observed anomalies    Other (Comments):

## 2018-11-19 NOTE — ANESTHESIA PROCEDURE NOTES
ANESTHESIA BLOCK SPINAL    Diagnosis: IUP  Patient location during procedure: OR  Start time: 11/19/2018 6:55 AM  Timeout: 11/19/2018 6:55 AM  End time: 11/19/2018 7:02 AM  Staffing  Anesthesiologist: Jadyn Fernandez MD  Resident/CRNA: Pradip Walton MD  Performed: resident/CRNA   Preanesthetic Checklist  Completed: patient identified, surgical consent, pre-op evaluation, timeout performed, IV checked, risks and benefits discussed and monitors and equipment checked  Spinal Block  Patient position: sitting  Prep: ChloraPrep  Patient monitoring: heart rate, cardiac monitor and continuous pulse ox  Approach: midline  Location: L4-5  Injection technique: single shot  CSF Fluid: clear free-flowing CSF  Needle  Needle type: pencil-tip   Needle gauge: 25 G  Needle length: 3.5 in  Additional Documentation: negative aspiration for heme  Needle localization: anatomical landmarks  Assessment  Sensory level: T4   Dermatomal levels determined by pinch or prick  Ease of block: easy  Patient's tolerance of the procedure: comfortable throughout block

## 2018-11-19 NOTE — H&P
"   HISTORY AND PHYSICAL                                                OBSTETRICS          Subjective:      Leydi Matias is a 28 y.o.  female with IUP at 39w0d weeks gestation who presents to L&D for repeat LTCS.   Pertinent medical history for this pregnancy includes HSV on Valtex , sciatica and prev c/s for breech  .  Patient reports fetal movement".  She denies "contractions, vaginal bleeding and leakage of fluid  Care this pregnancy has been with Dr. Shields    PMHx:   Past Medical History:   Diagnosis Date    Acid reflux     Herpes simplex virus (HSV) infection        PSHx:   Past Surgical History:   Procedure Laterality Date    BREAST SURGERY      AUGMENTATION     SECTION  2017    BREECH, female    DELIVERY- SECTION N/A 2017    Performed [facility] Robert [facility] at [facility] L[facility]D    TONSILLECTOMY         All: Review of patient's allergies indicates:  No Known Allergies    Meds:   No medications prior to admission.       SH:   Social History     Socioeconomic History    Marital status:      Spouse name: Not on file    Number of children: Not on file    Years of education: Not on file    Highest education level: Not on file   Social Needs    Financial resource strain: Not on file    Food insecurity - worry: Not on file    Food insecurity - inability: Not on file    Transportation needs - medical: Not on file    Transportation needs - non-medical: Not on file   Occupational History    Not on file   Tobacco Use    Smoking status: Never Smoker    Smokeless tobacco: Never Used   Substance and Sexual Activity    Alcohol use: No     Alcohol/week: 0.0 oz    Drug use: No    Sexual activity: Not Currently     Partners: Male     Birth control/protection: None     Comment: :  Ha   Other Topics Concern    Not on file   Social History Narrative    Not on file       FH:   Family History   Problem Relation Age of Onset    Breast cancer Mother 45    " Hyperlipidemia Mother     No Known Problems Daughter     Colon cancer Neg Hx     Ovarian cancer Neg Hx        OBHx:   Obstetric History       T1      L1     SAB0   TAB0   Ectopic0   Multiple0   Live Births1       # Outcome Date GA Lbr Rocco/2nd Weight Sex Delivery Anes PTL Lv   2 Current            1 Term 17 39w0d  3.56 kg (7 lb 13.6 oz) F CS-LTranv Spinal N DEBRA      Name: Lisa Karlene      Complications: Breech presentation      Apgar1:  8                Apgar5: 9          Objective:      LMP 2018   There is no height or weight on file to calculate BMI.    General:   alert and cooperative   HEENT:  normocephalic, atraumatic   Lungs:   clear to auscultation bilaterally   Heart:   regular rate and rhythm, S1, S2 normal   Abdomen:  gravid, non-tender   Extremities non-tender, no edema   Derm: no rashes or lesions   Psych: appropriate mood and affect   Pelvis:  adequate       FHT: TBD                 TOCO: TBD       Cervix: FT/30/-3/                                Lab Review  Blood Type A POS  GBBS: negative   Rubella:   Lab Results   Component Value Date    RUBELLAIGGAN 22.3 2018    immune  RPR:   RPR   Date Value Ref Range Status   10/31/2018 Non-reactive Non-reactive Final      HIV:   Lab Results   Component Value Date    [id number] Negative 10/31/2018     HepB:   Hepatitis B Surface Ag   Date Value Ref Range Status   2018 Negative  Final        Assessment:     28 y.o. 39w0d weeks gestation.  Prev LTCS for breech  Declines RITA desires repeat c/s    There are no hospital problems to display for this patient.         Plan:        1. Risks, benefits, alternatives and possible complications have been discussed in detail with the patient. All questions have been answered, and Ms. Matias has voiced understanding and agrees to the treatment plan.  2. Consents signed and in chart- signed on  but I do not see them in Media :(  3. Admit to Labor and Delivery unit  4. Plan for repeat  LTCS

## 2018-11-19 NOTE — TRANSFER OF CARE
"Anesthesia Transfer of Care Note    Patient: Leydi Matias    Procedure(s) Performed: Procedure(s) (LRB):   SECTION (N/A)    Patient location: Labor and Delivery    Anesthesia Type: spinal    Transport from OR: Transported from OR on room air with adequate spontaneous ventilation    Post pain: adequate analgesia    Post assessment: no apparent anesthetic complications and tolerated procedure well    Post vital signs: stable    Level of consciousness: awake and alert    Nausea/Vomiting: no nausea/vomiting    Complications: none          Last vitals:   Visit Vitals  BP (!) 99/51   Pulse 78   Temp 36.1 °C (97 °F)   Resp 18   Ht 5' 7" (1.702 m)   Wt 98.6 kg (217 lb 6 oz)   LMP 2018   SpO2 100%   Breastfeeding? Unknown   BMI 34.05 kg/m²     "

## 2018-11-19 NOTE — PROGRESS NOTES
Pt feeling well this AM, ready for CS  occ ctx, no gush of fluid or vaginal bleeding.  Normal FM  No s/sx herpes  Spec exam negative    Fetal status reactive and reassuring    Consents for delivery and blood transfusion previously signed w Dr. Shields, but not scanned in.  Consents signed again, pt had no questions.     Will move forward with RLMILI Cuenca DO

## 2018-11-19 NOTE — ANESTHESIA PREPROCEDURE EVALUATION
[facility]  Anesthesia Obstetric Pre-Procedure Evaluation         Patient Name: Leydi Matias  YOB: 1990  MRN: [id number]    SUBJECTIVE:     Pre-operative evaluation for Procedure(s) (LRB):   SECTION (N/A)     2018    Leydi Matias is a 28 y.o. female, currently a  at 39w0d presents for planned repeat  x 2. First pregnancy complicated by breech position.     Lab Results   Component Value Date     (L) 2018       OB History    Para Term  AB Living   2 2 2 0 0 2   SAB TAB Ectopic Multiple Live Births   0 0 0 0 2      # Outcome Date GA Lbr Rocco/2nd Weight Sex Delivery Anes PTL Lv   2 Term 18 39w0d  3.827 kg (8 lb 7 oz) M CS-LTranv Spinal N DEBRA   1 Term 17 39w0d  3.56 kg (7 lb 13.6 oz) F CS-LTranv Spinal N DEBRA      Complications: Breech presentation          Patient Active Problem List   Diagnosis    Herpes simplex infection of genitourinary system     delivery delivered    Sacroiliac pain during pregnancy    Previous  delivery affecting pregnancy     delivery, delivered, current hospitalization       Review of patient's allergies indicates:  No Known Allergies    Current Medications:   acetaminophen  650 mg Oral Q6H    ketorolac  30 mg Intravenous Q6H    miSOPROStol        valACYclovir  500 mg Oral BID       No current facility-administered medications on file prior to encounter.      Current Outpatient Medications on File Prior to Encounter   Medication Sig Dispense Refill    PNV 67-iron ps-folate no.1-dha (VITAFOL ULTRA) 29 mg iron- 1 mg-200 mg Cap Take 1 tablet by mouth once daily. 30 capsule 11    valACYclovir (VALTREX) 1000 MG tablet Take 1 tablet (1,000 mg total) by mouth 2 (two) times daily. 60 tablet 6    ondansetron (ZOFRAN-ODT) 4 MG TbDL Take 2 tablets (8 mg  total) by mouth every 8 (eight) hours. 20 tablet 0       Past Surgical History:   Procedure Laterality Date    BREAST SURGERY      AUGMENTATION     SECTION  2017    BREECH, female    DELIVERY- SECTION N/A 2017    [facility] at [facility] L[facility]D    TONSILLECTOMY         Social History     Socioeconomic History    Marital status:      Spouse name: Not on file    Number of children: Not on file    Years of education: Not on file    Highest education level: Not on file   Social Needs    Financial resource strain: Not on file    Food insecurity - worry: Not on file    Food insecurity - inability: Not on file    Transportation needs - medical: Not on file    Transportation needs - non-medical: Not on file   Occupational History    Not on file   Tobacco Use    Smoking status: Never Smoker    Smokeless tobacco: Never Used   Substance and Sexual Activity    Alcohol use: No     Alcohol/week: 0.0 oz    Drug use: No    Sexual activity: Not Currently     Partners: Male     Birth control/protection: None     Comment: :  Ha   Other Topics Concern    Not on file   Social History Narrative    Not on file       OBJECTIVE:     Vital Signs Range (Last 24H):    Temp:  [36.1 °C (97 °F)-36.4 °C (97.5 °F)]   Pulse:  [66-98]   Resp:  [18-20]   BP: ()/(50-75)   SpO2:  [97 %-100 %]     BP Readings from Last 3 Encounters:   18 (!) 95/50   11/15/18 120/76   18 102/66           Wt Readings from Last 1 Encounters:   18 0600 98.6 kg (217 lb 6 oz)       CBC:   Lab Results   Component Value Date    WBC 5.56 2018    HGB 10.5 (L) 2018    HCT 34.5 (L) 2018    MCV 79 (L) 2018     (L) 2018       CMP:     Chemistry        Component Value Date/Time     2018 1511    K 3.5 2018 1511     2018 1511    CO2 23 2018 1511    BUN 9 2018 1511    CREATININE 0.7 2018 1511    GLU 72  11/05/2018 1511        Component Value Date/Time    CALCIUM 8.6 (L) 11/05/2018 1511    ALKPHOS 129 11/05/2018 1511    AST 22 11/05/2018 1511    ALT 13 11/05/2018 1511    BILITOT 0.3 11/05/2018 1511    ESTGFRAFRICA >60.0 11/05/2018 1511    EGFRNONAA >60.0 11/05/2018 1511            INR:  No results for input(s): PT, INR, PROTIME, APTT in the last 72 hours.    Diagnostic Studies: No relevant studies.    EKG: No recent studies available.    2D ECHO:  No results found for this or any previous visit.    ASSESSMENT/PLAN:         Anesthesia Evaluation    I have reviewed the Patient Summary Reports.     I have reviewed the Medications.     Review of Systems  Anesthesia Hx:  No problems with previous Anesthesia  History of prior surgery of interest to airway management or planning:  Denies Personal Hx of Anesthesia complications.   Social:  Non-Smoker, No Alcohol Use    Hematology/Oncology:     Oncology Normal    -- Anemia:   EENT/Dental:EENT/Dental Normal   Cardiovascular:  Cardiovascular Normal     Pulmonary:  Pulmonary Normal    Renal/:  Renal/ Normal     Hepatic/GI:   GERD, well controlled    Musculoskeletal:  Musculoskeletal Normal    Neurological:  Neurology Normal    Endocrine:  Endocrine Normal    Psych:  Psychiatric Normal           Physical Exam  General:  Well nourished    Airway/Jaw/Neck:  Airway Findings: Mouth Opening: Normal Tongue: Normal  General Airway Assessment: Adult  Mallampati: I  TM Distance: Normal, at least 6 cm         Dental:  Dental Findings: In tact   Chest/Lungs:  Chest/Lungs Clear    Heart/Vascular:  Heart Findings: Normal Heart murmur: negative Vascular Findings: Normal       Mental Status:  Mental Status Findings:  Cooperative, Alert and Oriented         Anesthesia Plan  Type of Anesthesia, risks & benefits discussed:  Anesthesia Type:  CSE, epidural, general, spinal  Patient's Preference:   Intra-op Monitoring Plan: standard ASA monitors  Intra-op Monitoring Plan Comments:   Post Op  Pain Control Plan: per primary service following discharge from PACU, IV/PO Opioids PRN and multimodal analgesia  Post Op Pain Control Plan Comments:   Induction:    Beta Blocker:  Patient is not currently on a Beta-Blocker (No further documentation required).       Informed Consent: Patient understands risks and agrees with Anesthesia plan.  Questions answered. Anesthesia consent signed with patient.  ASA Score: 2     Day of Surgery Review of History & Physical: I have interviewed and examined the patient. I have reviewed the patient's H&P dated:  There are no significant changes.  H&P update referred to the surgeon.         Ready For Surgery From Anesthesia Perspective.

## 2018-11-19 NOTE — INTERVAL H&P NOTE
The patient has been examined and the H&P has been reviewed:    I concur with the findings and no changes have occurred since H&P was written.    Starla Cuenca, DO        There are no hospital problems to display for this patient.

## 2018-11-19 NOTE — LACTATION NOTE
11/19/18 1600   Maternal Infant Feeding   Maternal Emotional State relaxed   Time Spent (min) 0-15 min   Latch Assistance (to call)   Breastfeeding Education adequate infant intake;adequate milk volume;diet;importance of skin-to-skin contact;milk expression, hand   Lactation Interventions   Latch Promotion (to call)   Basic education reviewed. Pt to call LC for latch assessment.

## 2018-11-19 NOTE — DISCHARGE INSTRUCTIONS
Breastfeeding Discharge Instructions       Feed the baby at the earliest sign of hunger or comfort  o Hands to mouth, sucking motions  o Rooting or searching for something to suck on  o Dont wait for crying - it is a sign of distress     The feedings may be 8-12 times per 24hrs and will not follow a schedule   Avoid pacifiers and bottles for the first 4 weeks   Alternate the breast you start the feeding with, or start with the breast that feels the fullest   Switch breasts when the baby takes himself off the breast or falls asleep   Keep offering breasts until the baby looks full, no longer gives hunger signs, and stays asleep when placed on his back in the crib   If the baby is sleepy and wont wake for a feeding, put the baby skin-to-skin dressed in a diaper against the mothers bare chest   Sleep near your baby   The baby should be positioned and latched on to the breast correctly  o Chest-to-chest, chin in the breast  o Babys lips are flipped outward  o Babys mouth is stretched open wide like a shout  o Babys sucking should feel like tugging to the mother  - The baby should be drinking at the breast:  o You should hear swallowing or gulping throughout the feeding  o You should see milk on the babys lips when he comes off the breast  o Your breasts should be softer when the baby is finished feeding  o The baby should look relaxed at the end of feedings  o After the 4th day and your milk is in:  o The babys poop should turn bright yellow and be loose, watery, and seedy  o The baby should have at least 3-4 poops the size of the palm of your hand per day  o The baby should have at least 5-6 wet diapers per day  o The urine should be light yellow in color  You should drink when you are thirsty and eat a healthy diet when you are    hungry.     Take naps to get the rest you need.   Take medications and/or drink alcohol only with permission of your obstetrician    or the babys pediatrician.  You can  also call the Infant Risk Center,   ([phone]), Monday-Friday, 8am-5pm Central time, to get the most   up-to-date evidence-based information on the use of medications during   pregnancy and breastfeeding.      The baby should be examined by a pediatrician at 3-5 days of age.  Once your   milk comes in, the baby should be gaining at least ½ - 1oz each day and should be back to birthweight no later than 10-14 days of age.          Community Resources    [facility] Breastfeeding Warmline: [phone]   Local [facility] clinics: provide incentives and breastpumps to eligible mothers  La L[employer]he [employer] (LLLI):  mother-to-mother support group website        www.GlucoTecl.Dome9 Security  Local La Leche League mother-to-mother support groups:        www.Osper        La Leche League [facility]   Dr. Dixon Roman website for latch videos and general information:        www.breastfeedinginc.ca  Infant Risk Center is a call center that provides information about the safety of taking medications while breastfeeding.  Call [phone], M-F, 8am-5pm, [place].  International Lactation Consultant Association provides resources for assistance:        www.ilca.org  [place] Breastfeeding Coalition provides informationand resources for parents  and the community    www.LaBreastfeedingSupport.org     Adela Mathias is a mom-to-mom support group:                             www.Anywhere.FMvijiGeoEye.com//breastfeedng-support/  Partners for Healthy Babies:  0-571-627-BABY(3175)  C[employer]ait: a breastfeeding support group for women of color, [phone]

## 2018-11-20 LAB
BASOPHILS # BLD AUTO: 0.01 K/UL
BASOPHILS NFR BLD: 0.2 %
DIFFERENTIAL METHOD: ABNORMAL
EOSINOPHIL # BLD AUTO: 0.1 K/UL
EOSINOPHIL NFR BLD: 2 %
ERYTHROCYTE [DISTWIDTH] IN BLOOD BY AUTOMATED COUNT: 18.7 %
HCT VFR BLD AUTO: 27.8 %
HGB BLD-MCNC: 8.6 G/DL
LYMPHOCYTES # BLD AUTO: 1.6 K/UL
LYMPHOCYTES NFR BLD: 31.4 %
MCH RBC QN AUTO: 24.5 PG
MCHC RBC AUTO-ENTMCNC: 30.9 G/DL
MCV RBC AUTO: 79 FL
MONOCYTES # BLD AUTO: 0.3 K/UL
MONOCYTES NFR BLD: 5.9 %
NEUTROPHILS # BLD AUTO: 3 K/UL
NEUTROPHILS NFR BLD: 60.3 %
PLATELET # BLD AUTO: 111 K/UL
PMV BLD AUTO: 10.1 FL
RBC # BLD AUTO: 3.51 M/UL
WBC # BLD AUTO: 4.93 K/UL

## 2018-11-20 PROCEDURE — 63600175 PHARM REV CODE 636 W HCPCS: Performed by: OBSTETRICS & GYNECOLOGY

## 2018-11-20 PROCEDURE — 99024 POSTOP FOLLOW-UP VISIT: CPT | Mod: ,,, | Performed by: OBSTETRICS & GYNECOLOGY

## 2018-11-20 PROCEDURE — 85025 COMPLETE CBC W/AUTO DIFF WBC: CPT

## 2018-11-20 PROCEDURE — 25000003 PHARM REV CODE 250: Performed by: OBSTETRICS & GYNECOLOGY

## 2018-11-20 PROCEDURE — 36415 COLL VENOUS BLD VENIPUNCTURE: CPT

## 2018-11-20 PROCEDURE — 11000001 HC ACUTE MED/SURG PRIVATE ROOM

## 2018-11-20 PROCEDURE — 63600175 PHARM REV CODE 636 W HCPCS: Performed by: STUDENT IN AN ORGANIZED HEALTH CARE EDUCATION/TRAINING PROGRAM

## 2018-11-20 PROCEDURE — 25000003 PHARM REV CODE 250: Performed by: STUDENT IN AN ORGANIZED HEALTH CARE EDUCATION/TRAINING PROGRAM

## 2018-11-20 RX ORDER — ADHESIVE BANDAGE
30 BANDAGE TOPICAL 2 TIMES DAILY PRN
Status: DISCONTINUED | OUTPATIENT
Start: 2018-11-20 | End: 2018-11-21 | Stop reason: HOSPADM

## 2018-11-20 RX ORDER — OXYTOCIN/RINGER'S LACTATE 20/1000 ML
41.65 PLASTIC BAG, INJECTION (ML) INTRAVENOUS CONTINUOUS
Status: DISCONTINUED | OUTPATIENT
Start: 2018-11-20 | End: 2018-11-20

## 2018-11-20 RX ORDER — OXYCODONE AND ACETAMINOPHEN 5; 325 MG/1; MG/1
1 TABLET ORAL EVERY 4 HOURS PRN
Status: DISCONTINUED | OUTPATIENT
Start: 2018-11-20 | End: 2018-11-21 | Stop reason: HOSPADM

## 2018-11-20 RX ORDER — ADHESIVE BANDAGE
30 BANDAGE TOPICAL 2 TIMES DAILY PRN
Status: DISCONTINUED | OUTPATIENT
Start: 2018-11-21 | End: 2018-11-20

## 2018-11-20 RX ORDER — AMOXICILLIN 250 MG
1 CAPSULE ORAL NIGHTLY PRN
Status: DISCONTINUED | OUTPATIENT
Start: 2018-11-20 | End: 2018-11-21 | Stop reason: HOSPADM

## 2018-11-20 RX ORDER — BISACODYL 10 MG
10 SUPPOSITORY, RECTAL RECTAL ONCE AS NEEDED
Status: ACTIVE | OUTPATIENT
Start: 2018-11-20 | End: 2018-11-20

## 2018-11-20 RX ORDER — OXYCODONE AND ACETAMINOPHEN 10; 325 MG/1; MG/1
1 TABLET ORAL EVERY 4 HOURS PRN
Status: DISCONTINUED | OUTPATIENT
Start: 2018-11-20 | End: 2018-11-21 | Stop reason: HOSPADM

## 2018-11-20 RX ORDER — IBUPROFEN 600 MG/1
600 TABLET ORAL EVERY 6 HOURS
Status: DISCONTINUED | OUTPATIENT
Start: 2018-11-20 | End: 2018-11-21 | Stop reason: HOSPADM

## 2018-11-20 RX ORDER — DOCUSATE SODIUM 100 MG/1
200 CAPSULE, LIQUID FILLED ORAL 2 TIMES DAILY
Qty: 40 CAPSULE | Refills: 0 | COMMUNITY
Start: 2018-11-20 | End: 2018-12-04

## 2018-11-20 RX ORDER — SIMETHICONE 80 MG
1 TABLET,CHEWABLE ORAL EVERY 6 HOURS PRN
Status: DISCONTINUED | OUTPATIENT
Start: 2018-11-20 | End: 2018-11-21 | Stop reason: HOSPADM

## 2018-11-20 RX ORDER — ACETAMINOPHEN 325 MG/1
650 TABLET ORAL EVERY 6 HOURS PRN
Status: DISCONTINUED | OUTPATIENT
Start: 2018-11-20 | End: 2018-11-21 | Stop reason: HOSPADM

## 2018-11-20 RX ORDER — HYDROCORTISONE 25 MG/G
CREAM TOPICAL 3 TIMES DAILY PRN
Status: DISCONTINUED | OUTPATIENT
Start: 2018-11-20 | End: 2018-11-21 | Stop reason: HOSPADM

## 2018-11-20 RX ORDER — OXYCODONE AND ACETAMINOPHEN 5; 325 MG/1; MG/1
1 TABLET ORAL EVERY 4 HOURS PRN
Qty: 30 TABLET | Refills: 0 | Status: SHIPPED | OUTPATIENT
Start: 2018-11-20 | End: 2018-12-04

## 2018-11-20 RX ORDER — IBUPROFEN 600 MG/1
600 TABLET ORAL EVERY 6 HOURS
Qty: 60 TABLET | Refills: 1 | Status: SHIPPED | OUTPATIENT
Start: 2018-11-20 | End: 2018-12-04

## 2018-11-20 RX ORDER — DOCUSATE SODIUM 100 MG/1
200 CAPSULE, LIQUID FILLED ORAL 2 TIMES DAILY
Status: DISCONTINUED | OUTPATIENT
Start: 2018-11-20 | End: 2018-11-21 | Stop reason: HOSPADM

## 2018-11-20 RX ORDER — MISOPROSTOL 200 UG/1
200 TABLET ORAL ONCE AS NEEDED
Status: ACTIVE | OUTPATIENT
Start: 2018-11-20 | End: 2018-11-20

## 2018-11-20 RX ORDER — SODIUM CHLORIDE, SODIUM LACTATE, POTASSIUM CHLORIDE, CALCIUM CHLORIDE 600; 310; 30; 20 MG/100ML; MG/100ML; MG/100ML; MG/100ML
INJECTION, SOLUTION INTRAVENOUS CONTINUOUS
Status: DISCONTINUED | OUTPATIENT
Start: 2018-11-20 | End: 2018-11-20

## 2018-11-20 RX ADMIN — VALACYCLOVIR HYDROCHLORIDE 500 MG: 500 TABLET, FILM COATED ORAL at 09:11

## 2018-11-20 RX ADMIN — OXYCODONE AND ACETAMINOPHEN 1 TABLET: 5; 325 TABLET ORAL at 06:11

## 2018-11-20 RX ADMIN — IBUPROFEN 600 MG: 600 TABLET ORAL at 10:11

## 2018-11-20 RX ADMIN — OXYCODONE AND ACETAMINOPHEN 1 TABLET: 5; 325 TABLET ORAL at 10:11

## 2018-11-20 RX ADMIN — STANDARDIZED SENNA CONCENTRATE AND DOCUSATE SODIUM 1 TABLET: 8.6; 5 TABLET, FILM COATED ORAL at 08:11

## 2018-11-20 RX ADMIN — KETOROLAC TROMETHAMINE 30 MG: 30 INJECTION, SOLUTION INTRAMUSCULAR at 02:11

## 2018-11-20 RX ADMIN — DOCUSATE SODIUM 200 MG: 100 CAPSULE, LIQUID FILLED ORAL at 09:11

## 2018-11-20 RX ADMIN — SIMETHICONE CHEW TAB 80 MG 80 MG: 80 TABLET ORAL at 08:11

## 2018-11-20 RX ADMIN — POLYETHYLENE GLYCOL 3350 17 G: 17 POWDER, FOR SOLUTION ORAL at 09:11

## 2018-11-20 RX ADMIN — OXYCODONE HYDROCHLORIDE AND ACETAMINOPHEN 1 TABLET: 10; 325 TABLET ORAL at 10:11

## 2018-11-20 RX ADMIN — NALBUPHINE HYDROCHLORIDE 10 MG: 10 INJECTION, SOLUTION INTRAMUSCULAR; INTRAVENOUS; SUBCUTANEOUS at 02:11

## 2018-11-20 RX ADMIN — DOCUSATE SODIUM 200 MG: 100 CAPSULE, LIQUID FILLED ORAL at 08:11

## 2018-11-20 RX ADMIN — VALACYCLOVIR HYDROCHLORIDE 500 MG: 500 TABLET, FILM COATED ORAL at 08:11

## 2018-11-20 RX ADMIN — ACETAMINOPHEN 650 MG: 325 TABLET ORAL at 02:11

## 2018-11-20 RX ADMIN — OXYCODONE HYDROCHLORIDE AND ACETAMINOPHEN 1 TABLET: 10; 325 TABLET ORAL at 02:11

## 2018-11-20 RX ADMIN — OXYCODONE AND ACETAMINOPHEN 1 TABLET: 5; 325 TABLET ORAL at 09:11

## 2018-11-20 RX ADMIN — IBUPROFEN 600 MG: 600 TABLET ORAL at 06:11

## 2018-11-20 NOTE — PROGRESS NOTES
[employer] pump, tubing, collections containers and labels brought to bedside.  Discussed proper pump setting of initiation phase.  Instructed on proper usage of pump and to adjust suction according to maximum comfort level.  Verified appropriate flange fit.  Educated on the frequency and duration of pumping in order to promote and maintain a full milk supply.  Hands on pumping technique reviewed. structed on cleaning of breast pump parts.   Pt verbalized understanding.

## 2018-11-20 NOTE — LACTATION NOTE
"   11/19/18 2037   Maternal Medical Surgical History   Surgical Procedure breast augmentation  (right nipple/aerola removed)   Maternal Infant Assessment   Breast Density Bilateral:;soft   Areola Bilateral:;elastic   Nipple(s) Bilateral:;everted  (short)   Infant Assessment   Sucking Reflex present   Rooting Reflex present   Swallow Reflex present   LATCH Score   Latch 1-->repeated attempts, holds nipple in mouth, stimulate to suck   Audible Swallowing 1-->a few with stimulation   Type Of Nipple 2-->everted (after stimulation)   Comfort (Breast/Nipple) 2-->soft/nontender   Hold (Positioning) 1-->minimal assist, teach one side: mother does other, staff holds   Score (less than 7 for 2/more consecutive times, consult Lactation Consultant) 7   Breasts WDL   Breasts WDL WDL       Number Scale   Presence of Pain denies   Location nipple(s)   Pain Rating: Rest 0   Pain Rating: Activity 0   Maternal Infant Feeding   Maternal Emotional State relaxed   Infant Positioning clutch/"football"   Signs of Milk Transfer audible swallow;infant jaw motion present   Presence of Pain no   Comfort Measures Before/During Feeding infant position adjusted;latch adjusted;maternal position adjusted   Time Spent (min) 15-30 min   Latch Assistance yes   Breastfeeding Education adequate infant intake;adequate milk volume;diet;importance of skin-to-skin contact;milk expression, hand   Feeding Infant   Feeding Tolerance/Success coordinated suck;coordinated swallow   Effective Latch During Feeding yes   Suck/Swallow Coordination present   Skin-to-Skin Contact During Feeding yes   Lactation Referrals   Lactation Consult Follow up   Lactation Interventions   Attachment Promotion breastfeeding assistance provided   Breastfeeding Assistance assisted with positioning;feeding cue recognition promoted;feeding on demand promoted;infant latch-on verified;infant suck/swallow verified;nipple shell utilized   Maternal Breastfeeding Support diary/feeding log " utilized;encouragement offered;infant-mother separation minimized;lactation counseling provided;maternal hydration promoted;maternal nutrition promoted;maternal rest encouraged   Latch Promotion positioning assisted   latch assistance , nipples flatten with compression. Encouraged pt to wear bra shells. infant does on and off the sustains with breast support and compression. Nursing well.

## 2018-11-20 NOTE — PROGRESS NOTES
"S- patient c/o extreme itching post op not relieved by benadryl IV  O.- BP (!) 86/48   Pulse 86   Temp 98.1 °F (36.7 °C) (Oral)   Resp 18   Ht 5' 7" (1.702 m)   Wt 98.6 kg (217 lb 6 oz)   LMP 02/25/2018   SpO2 100%   Breastfeeding? Yes   BMI 34.05 kg/m²   Asymptomatic of hypotension    PE: AA&Ox 4, NAD  Resp; nonlabored  Abdomen: soft, Dressing clean and dry  Lochia - light  Extremities: 2+ pulses, warm; no rash noted    A: POD 0 repeat CD with pruritis due to Duramorph  Plan: continue post op care; nubain PRN itching.  "

## 2018-11-20 NOTE — SUBJECTIVE & OBJECTIVE
Hospital course: POD#1: patient is without unusual complaints.  Taking colase/Miralax daily due to concerns with constipation.       She is doing well this morning. She is tolerating a regular diet without nausea or vomiting. She is voiding spontaneously. She is ambulating. She has passed flatus, and has not a BM. Vaginal bleeding is mild. She denies fever or chills. Abdominal pain is mild and controlled with oral medications. She is breastfeeding. She desires circumcision for her male baby: yes.    Objective:     Vital Signs (Most Recent):  Temp: 97.8 °F (36.6 °C) (11/20/18 0700)  Pulse: 77 (11/20/18 0700)  Resp: 18 (11/20/18 0700)  BP: 103/63 (11/20/18 0700)  SpO2: 98 % (11/20/18 0700) Vital Signs (24h Range):  Temp:  [97.8 °F (36.6 °C)-98.1 °F (36.7 °C)] 97.8 °F (36.6 °C)  Pulse:  [60-88] 77  Resp:  [18] 18  SpO2:  [96 %-100 %] 98 %  BP: ()/(48-71) 103/63     Weight: 98.6 kg (217 lb 6 oz)  Body mass index is 34.05 kg/m².      Intake/Output Summary (Last 24 hours) at 11/20/2018 0917  Last data filed at 11/20/2018 0510  Gross per 24 hour   Intake 1000 ml   Output 2191.5 ml   Net -1191.5 ml       Significant Labs:  Lab Results   Component Value Date    GROUPTRH A POS 11/19/2018    HEPBSAG Negative 04/26/2018    STREPBCULT No Group B Streptococcus isolated 10/31/2018     Recent Labs   Lab 11/20/18  0103   HGB 8.6*   HCT 27.8*       I have personallly reviewed all pertinent lab results from the last 24 hours.    Physical Exam:   Constitutional: She is oriented to person, place, and time. She appears well-developed and well-nourished. No distress.       Cardiovascular: Normal rate.     Pulmonary/Chest: No respiratory distress.        Abdominal: Soft. She exhibits abdominal incision. She exhibits no distension. There is no tenderness. There is no rebound and no guarding.   Dressing intact, no drainage noted     Genitourinary:   Genitourinary Comments: Fundus firm, NT           Musculoskeletal: She exhibits no  edema or tenderness.       Neurological: She is alert and oriented to person, place, and time.    Skin: Skin is warm and dry.    Psychiatric: She has a normal mood and affect.

## 2018-11-20 NOTE — ANESTHESIA POSTPROCEDURE EVALUATION
"Anesthesia Post Evaluation    Patient: Leydi Matias    Procedure(s) Performed: Procedure(s) (LRB):   SECTION (N/A)    Final Anesthesia Type: spinal  Patient location during evaluation: labor & delivery  Patient participation: Yes- Able to Participate  Level of consciousness: awake and alert and oriented  Post-procedure vital signs: reviewed and stable  Pain management: adequate  Airway patency: patent  PONV status at discharge: No PONV  Anesthetic complications: no      Cardiovascular status: blood pressure returned to baseline  Respiratory status: unassisted and spontaneous ventilation  Hydration status: euvolemic  Follow-up not needed.        Visit Vitals  /63 (BP Location: Right arm, Patient Position: Sitting)   Pulse 77   Temp 36.6 °C (97.8 °F) (Oral)   Resp 18   Ht 5' 7" (1.702 m)   Wt 98.6 kg (217 lb 6 oz)   LMP 2018   SpO2 98%   Breastfeeding? Yes   BMI 34.05 kg/m²       Pain/Deedee Score: Pain Rating Prior to Med Admin: 7 (2018  2:00 PM)  Pain Rating Post Med Admin: 2 (2018 11:30 AM)        "

## 2018-11-20 NOTE — PROGRESS NOTES
[facility]  Obstetrics  Postpartum Progress Note    Patient Name: Leydi Matias  MRN: [id number]  Admission Date: 2018  Hospital Length of Stay: 1 days  Attending Physician: Robert Shields MD  Primary Care Provider: Primary Doctor No    Subjective:     Principal Problem: delivery, delivered, current hospitalization    Hospital course: POD#1: patient is without unusual complaints.  Taking colase/Miralax daily due to concerns with constipation.       She is doing well this morning. She is tolerating a regular diet without nausea or vomiting. She is voiding spontaneously. She is ambulating. She has passed flatus, and has not a BM. Vaginal bleeding is mild. She denies fever or chills. Abdominal pain is mild and controlled with oral medications. She is breastfeeding. She desires circumcision for her male baby: yes.    Objective:     Vital Signs (Most Recent):  Temp: 97.8 °F (36.6 °C) (18 0700)  Pulse: 77 (18 0700)  Resp: 18 (18 0700)  BP: 103/63 (18 0700)  SpO2: 98 % (18 07) Vital Signs (24h Range):  Temp:  [97.8 °F (36.6 °C)-98.1 °F (36.7 °C)] 97.8 °F (36.6 °C)  Pulse:  [60-88] 77  Resp:  [18] 18  SpO2:  [96 %-100 %] 98 %  BP: ()/(48-71) 103/63     Weight: 98.6 kg (217 lb 6 oz)  Body mass index is 34.05 kg/m².      Intake/Output Summary (Last 24 hours) at 2018 0917  Last data filed at 2018 0510  Gross per 24 hour   Intake 1000 ml   Output 2191.5 ml   Net -1191.5 ml       Significant Labs:  Lab Results   Component Value Date    GROUPTRH A POS 2018    HEPBSAG Negative 2018    STREPBCULT No Group B Streptococcus isolated 10/31/2018     Recent Labs   Lab 18  0103   HGB 8.6*   HCT 27.8*       I have personallly reviewed all pertinent lab results from the last 24 hours.    Physical Exam:   Constitutional: She is oriented to person, place, and time. She appears well-developed and well-nourished. No distress.        Cardiovascular: Normal rate.     Pulmonary/Chest: No respiratory distress.        Abdominal: Soft. She exhibits abdominal incision. She exhibits no distension. There is no tenderness. There is no rebound and no guarding.   Dressing intact, no drainage noted     Genitourinary:   Genitourinary Comments: Fundus firm, NT           Musculoskeletal: She exhibits no edema or tenderness.       Neurological: She is alert and oriented to person, place, and time.    Skin: Skin is warm and dry.    Psychiatric: She has a normal mood and affect.       Assessment/Plan:     28 y.o. female  for:    *  delivery, delivered, current hospitalization    Routine post op care, ambulation encouraged.          Disposition: As patient meets milestones, will plan to discharge on POD#3.    Loretta Vaughn MD  Obstetrics  [facility], [place]

## 2018-11-20 NOTE — HOSPITAL COURSE
POD#1: patient is without unusual complaints.  Taking colase/Miralax daily due to concerns with constipation.   POD#2: Pain controlled on oral pain meds; worried about constipation - desires magnesium citrate oral solution to drink

## 2018-11-20 NOTE — PROGRESS NOTES
11/20/18 0030   Vital Signs   Temp 98 °F (36.7 °C)   Temp src Oral   Pulse 83   Heart Rate Source SpO2   Resp 18   SpO2 97 %   BP (!) 94/50  (reported to PHI Burr RN)       Patient asymptomatic: denies weakness, dizziness, and being lighthead

## 2018-11-20 NOTE — PHYSICIAN QUERY
"PT Name: Leydi Matias  MR #: [id number]    Physician Query Form - Hematology Clarification      CDS/: Raven Villanueva               Contact information:    This form is a permanent document in the medical record.      Query Date: November 20, 2018    By submitting this query, we are merely seeking further clarification of documentation. Please utilize your independent clinical judgment when addressing the question(s) below.    The Medical record contains the following:   Indicators  Supporting Clinical Findings Location in Medical Record   x "Anemia" documented  Anesthesia 11/19   x H & H = H/h 8.6/27.8 down from 10.5/34.5 Lab results dates 11/19/18-11-20-18    BP =                     HR=      "GI bleeding" documented      Acute bleeding (Non GI site)      Transfusion(s)      Treatment:     x Other:  c sec qbl 575 Labor and delivery note     Provider, please specify diagnosis or diagnoses associated with above clinical findings.    x Acute blood loss anemia expected post-operatively    Acute blood loss anemia        Iron deficiency anemia    Chronic blood loss anemia      Clinically Undetermined       Please document in your progress notes daily for the duration of treatment, until resolved, and include in your discharge summary.                                                                                                      "

## 2018-11-20 NOTE — PLAN OF CARE
Problem: Patient Care Overview  Goal: Plan of Care Review  Outcome: Ongoing (interventions implemented as appropriate)  VSS. Uterus firm w/o massage, bleeding continues to improve. Pt ambulating independently. Hasn't been able to void by herself yet after rice catheter removal. Bladder scan and in-out catheter performed per MD order. Pain managed adequately w/medication. Plan of care reviewed w/pt and spouse.  Will continue to monitor.

## 2018-11-21 VITALS
HEART RATE: 79 BPM | SYSTOLIC BLOOD PRESSURE: 99 MMHG | WEIGHT: 217.38 LBS | OXYGEN SATURATION: 98 % | TEMPERATURE: 98 F | RESPIRATION RATE: 18 BRPM | HEIGHT: 67 IN | BODY MASS INDEX: 34.12 KG/M2 | DIASTOLIC BLOOD PRESSURE: 58 MMHG

## 2018-11-21 PROBLEM — O34.219 PREVIOUS CESAREAN DELIVERY AFFECTING PREGNANCY: Status: RESOLVED | Noted: 2018-11-19 | Resolved: 2018-11-21

## 2018-11-21 PROCEDURE — 25000003 PHARM REV CODE 250: Performed by: OBSTETRICS & GYNECOLOGY

## 2018-11-21 PROCEDURE — 99024 POSTOP FOLLOW-UP VISIT: CPT | Mod: ,,, | Performed by: OBSTETRICS & GYNECOLOGY

## 2018-11-21 RX ORDER — SYRING-NEEDL,DISP,INSUL,0.3 ML 29 G X1/2"
148 SYRINGE, EMPTY DISPOSABLE MISCELLANEOUS ONCE
Status: COMPLETED | OUTPATIENT
Start: 2018-11-21 | End: 2018-11-21

## 2018-11-21 RX ORDER — POLYETHYLENE GLYCOL 3350 17 G/17G
17 POWDER, FOR SOLUTION ORAL DAILY
Qty: 527 G | Refills: 0 | Status: SHIPPED | OUTPATIENT
Start: 2018-11-22 | End: 2018-12-04

## 2018-11-21 RX ORDER — SYRING-NEEDL,DISP,INSUL,0.3 ML 29 G X1/2"
148 SYRINGE, EMPTY DISPOSABLE MISCELLANEOUS ONCE
Qty: 296 ML | Refills: 0 | Status: SHIPPED | OUTPATIENT
Start: 2018-11-21 | End: 2018-11-21

## 2018-11-21 RX ADMIN — ONDANSETRON 8 MG: 8 TABLET, ORALLY DISINTEGRATING ORAL at 11:11

## 2018-11-21 RX ADMIN — IBUPROFEN 600 MG: 600 TABLET ORAL at 01:11

## 2018-11-21 RX ADMIN — Medication 148 ML: at 01:11

## 2018-11-21 RX ADMIN — IBUPROFEN 600 MG: 600 TABLET ORAL at 06:11

## 2018-11-21 RX ADMIN — SIMETHICONE CHEW TAB 80 MG 80 MG: 80 TABLET ORAL at 06:11

## 2018-11-21 RX ADMIN — DOCUSATE SODIUM 200 MG: 100 CAPSULE, LIQUID FILLED ORAL at 08:11

## 2018-11-21 RX ADMIN — POLYETHYLENE GLYCOL 3350 17 G: 17 POWDER, FOR SOLUTION ORAL at 08:11

## 2018-11-21 RX ADMIN — IBUPROFEN 600 MG: 600 TABLET ORAL at 11:11

## 2018-11-21 RX ADMIN — VALACYCLOVIR HYDROCHLORIDE 500 MG: 500 TABLET, FILM COATED ORAL at 08:11

## 2018-11-21 RX ADMIN — OXYCODONE HYDROCHLORIDE AND ACETAMINOPHEN 1 TABLET: 10; 325 TABLET ORAL at 10:11

## 2018-11-21 RX ADMIN — OXYCODONE HYDROCHLORIDE AND ACETAMINOPHEN 1 TABLET: 10; 325 TABLET ORAL at 06:11

## 2018-11-21 RX ADMIN — MAGNESIUM HYDROXIDE 2400 MG: 400 SUSPENSION ORAL at 01:11

## 2018-11-21 RX ADMIN — OXYCODONE AND ACETAMINOPHEN 1 TABLET: 5; 325 TABLET ORAL at 02:11

## 2018-11-21 NOTE — PLAN OF CARE
Problem: Patient Care Overview  Goal: Plan of Care Review  Outcome: Outcome(s) achieved Date Met: 11/21/18  Patient doing well. VS stable. Pain well controlled with oral pain medication. Patient will monitor for constipation at home and will call MD with any concerns.

## 2018-11-21 NOTE — SUBJECTIVE & OBJECTIVE
Hospital course: POD#1: patient is without unusual complaints.  Taking colase/Miralax daily due to concerns with constipation.   POD#2: Pain controlled on oral pain meds; worried about constipation - desires magnesium citrate oral solution to drink    Interval History:     She is doing well this morning. She is tolerating a regular diet without nausea or vomiting. She is voiding spontaneously. She is ambulating. She has passed flatus, and has not a BM. Vaginal bleeding is mild. She denies fever or chills. Abdominal pain is moderate and controlled with oral medications.     Objective:     Vital Signs (Most Recent):  Temp: 98.1 °F (36.7 °C) (11/21/18 0800)  Pulse: 79 (11/21/18 0800)  Resp: 18 (11/21/18 0800)  BP: (!) 99/58 (11/21/18 0800)  SpO2: 98 % (11/21/18 0800) Vital Signs (24h Range):  Temp:  [97.5 °F (36.4 °C)-98.1 °F (36.7 °C)] 98.1 °F (36.7 °C)  Pulse:  [79-92] 79  Resp:  [18] 18  SpO2:  [97 %-98 %] 98 %  BP: ()/(58-59) 99/58     Weight: 98.6 kg (217 lb 6 oz)  Body mass index is 34.05 kg/m².    No intake or output data in the 24 hours ending 11/21/18 1317    Significant Labs:  Lab Results   Component Value Date    GROUPTRH A POS 11/19/2018    HEPBSAG Negative 04/26/2018    STREPBCULT No Group B Streptococcus isolated 10/31/2018     Recent Labs   Lab 11/20/18  0103   HGB 8.6*   HCT 27.8*       I have personallly reviewed all pertinent lab results from the last 24 hours.    Physical Exam:   Constitutional: She is oriented to person, place, and time. She appears well-developed and well-nourished. No distress.    HENT:   Head: Normocephalic and atraumatic.      Cardiovascular: Normal rate, regular rhythm, normal heart sounds and intact distal pulses.     Pulmonary/Chest: Effort normal. No respiratory distress.        Abdominal: Soft. Bowel sounds are normal. She exhibits no mass. Abdominal incision: c/d/i. There is no guarding.   Firm fundus below umbilicus     Genitourinary: Uterus normal. No vaginal  discharge found.           Musculoskeletal: Normal range of motion and moves all extremeties.       Neurological: She is alert and oriented to person, place, and time.    Skin: Skin is warm. She is not diaphoretic.    Psychiatric: She has a normal mood and affect. Her behavior is normal. Judgment and thought content normal.

## 2018-11-21 NOTE — DISCHARGE SUMMARY
[facility]  Obstetrics  Discharge Summary      Patient Name: Leydi Matias  MRN: [id number]  Admission Date: 2018  Hospital Length of Stay: 2 days  Discharge Date and Time:  2018 3:02 PM  Attending Physician: Robert Esteban MD   Discharging Provider: Delma Henderson MD  Primary Care Provider: Primary Doctor No    HPI: 27 y/o  s/p RLTCS    Procedure(s) (LRB):   SECTION (N/A)     Hospital Course:   POD#1: patient is without unusual complaints.  Taking colase/Miralax daily due to concerns with constipation.   POD#2: Pain controlled on oral pain meds; worried about constipation - desires magnesium citrate oral solution to drink    Consults (From admission, onward)        Status Ordering Provider     Consult to Lactation  Use PRN     Provider:  (Not yet assigned)    Acknowledged MARYSE BARKER     Inpatient consult to Anesthesiology  Once     Provider:  (Not yet assigned)    Acknowledged ROBERT ESTEBAN          Final Active Diagnoses:    Diagnosis Date Noted POA    PRINCIPAL PROBLEM:   delivery, delivered, current hospitalization [O82] 2018 No      Problems Resolved During this Admission:    Diagnosis Date Noted Date Resolved POA    Previous  delivery affecting pregnancy [O34.219] 2018 Yes        Labs: All labs within the past 24 hours have been reviewed    Feeding Method: see RN note    Immunizations     Date Immunization Status Dose Route/Site Given by    18 0703 Influenza - Quadrivalent - PF Incomplete 0.5 mL Intramuscular/Left deltoid     18 0745 MMR Deferred 0.5 mL Subcutaneous/Left deltoid Mignon Chua RN    18 0747 Tdap Deferred 0.5 mL Intramuscular/Left deltoid Mignon Chua RN          Delivery:    Episiotomy: None   Lacerations: None   Repair suture:     Repair # of packets: 7   Blood loss (ml): 0     Birth information:  YOB: 2020   Time of birth: 7:19 AM   Sex: male    Delivery type: , Low Transverse   Gestational Age: 39w0d    Delivery Clinician:      Other providers:       Additional  information:  Forceps:    Vacuum:    Breech:    Observed anomalies      Living?:           APGARS  One minute Five minutes Ten minutes   Skin color:         Heart rate:         Grimace:         Muscle tone:         Breathing:         Totals: 9  9        Placenta: Delivered:       appearance    Pending Diagnostic Studies:     None          Discharged Condition: good    Disposition: Home or Self Care    Follow Up:  Follow-up Information     Robert Shields MD In 2 weeks.    Specialties:  Obstetrics, Gynecology, Obstetrics and Gynecology  Why:  For wound re-check  Contact information:  [address]  [address]  [facility] [id number] [phone]             Robert Shields MD In 6 weeks.    Specialties:  Obstetrics, Gynecology, Obstetrics and Gynecology  Why:  Post partum exam  Contact information:  [address]  [address]  [facility] [address] [phone]                 Patient Instructions:   Patient Instructions:   1. Call the office for any bleeding >2 pads/hour for >2 hours, temperature >100.4, pain that is uncontrolled with medications, or for any other concerns.  2. Let soap and water run over incision, do not scrub. Keep incision dry.  3. No driving while on narcotics.  4. Call if malodorous drainage or redness from incision  5. Call office for overwhelming feelings of anxiety or sadness greater than 50% of the time  6. If no bowel movement for 1-2 days after discharge home, start Miralax (over the counter - take per package instructions) once a day. If still no bowel movement, increase to twice a day. Decrease to once a day or discontinue once having regular bowel movements.  No discharge procedures on file.  Medications:  Current Discharge Medication List      START taking these medications    Details   docusate sodium (COLACE) 100 MG capsule Take 2 capsules (200 mg total)  by mouth 2 (two) times daily.  Qty: 40 capsule, Refills: 0      ibuprofen (ADVIL,MOTRIN) 600 MG tablet Take 1 tablet (600 mg total) by mouth every 6 (six) hours.  Qty: 60 tablet, Refills: 1      magnesium citrate solution Take 148 mLs by mouth once. Drink half of bottle. If no bowel movement in 1 day - drink rest of bottle the following day for 1 dose  Qty: 1 Bottle, Refills: 0      oxyCODONE-acetaminophen (PERCOCET) 5-325 mg per tablet Take 1 tablet by mouth every 4 (four) hours as needed.  Qty: 30 tablet, Refills: 0      polyethylene glycol (GLYCOLAX) 17 gram PwPk Take 17 g by mouth once daily.  Qty: 30 each, Refills: 0         CONTINUE these medications which have NOT CHANGED    Details   PNV 67-iron ps-folate no.1-dha (VITAFOL ULTRA) 29 mg iron- 1 mg-200 mg Cap Take 1 tablet by mouth once daily.  Qty: 30 capsule, Refills: 11      valACYclovir (VALTREX) 1000 MG tablet Take 1 tablet (1,000 mg total) by mouth 2 (two) times daily.  Qty: 60 tablet, Refills: 6         STOP taking these medications       ondansetron (ZOFRAN-ODT) 4 MG TbDL Comments:   Reason for Stopping:               Delma Henderson MD  Obstetrics [facility]

## 2018-11-21 NOTE — ASSESSMENT & PLAN NOTE
Meets criteria for early DC  Patient has been on bowel regimen of colace and Miralax  She is very worried about having a bowel movement prior to DC - reassured patient  Patient requested Mag citrate - ordered

## 2018-11-21 NOTE — PROGRESS NOTES
Per Patient:  Last pregnancy. Patient went 7 days post delivery without having a bowel movement and had to have an enema. Numerous bowel medication given to patient, per md order, to try to prevent that from occurring again. No bowel movement yet. Patient informed nurse this morning, she had a bowel movement the morning of the /delivery  (before baby was delivered).     Patient informed nurse at 1 am rounds that she was finally able to pass gas. Prior to that patient hadn't passed gas since prior to delivery/ and was experiencing gas discomfort, simethicone given prn.

## 2018-11-21 NOTE — PROGRESS NOTES
[facility]  Obstetrics  Postpartum Progress Note    Patient Name: Leydi Matias  MRN: [id number]  Admission Date: 2018  Hospital Length of Stay: 2 days  Attending Physician: Robert Shields MD  Primary Care Provider: Primary Doctor No    Subjective:     Principal Problem: delivery, delivered, current hospitalization    Hospital course: POD#1: patient is without unusual complaints.  Taking colase/Miralax daily due to concerns with constipation.   POD#2: Pain controlled on oral pain meds; worried about constipation - desires magnesium citrate oral solution to drink    Interval History:     She is doing well this morning. She is tolerating a regular diet without nausea or vomiting. She is voiding spontaneously. She is ambulating. She has passed flatus, and has not a BM. Vaginal bleeding is mild. She denies fever or chills. Abdominal pain is moderate and controlled with oral medications.     Objective:     Vital Signs (Most Recent):  Temp: 98.1 °F (36.7 °C) (18 0800)  Pulse: 79 (18 0800)  Resp: 18 (18 0800)  BP: (!) 99/58 (18 0800)  SpO2: 98 % (18 0800) Vital Signs (24h Range):  Temp:  [97.5 °F (36.4 °C)-98.1 °F (36.7 °C)] 98.1 °F (36.7 °C)  Pulse:  [79-92] 79  Resp:  [18] 18  SpO2:  [97 %-98 %] 98 %  BP: ()/(58-59) 99/58     Weight: 98.6 kg (217 lb 6 oz)  Body mass index is 34.05 kg/m².    No intake or output data in the 24 hours ending 18 1317    Significant Labs:  Lab Results   Component Value Date    GROUPTRH A POS 2018    HEPBSAG Negative 2018    STREPBCULT No Group B Streptococcus isolated 10/31/2018     Recent Labs   Lab 18  0103   HGB 8.6*   HCT 27.8*       I have personallly reviewed all pertinent lab results from the last 24 hours.    Physical Exam:   Constitutional: She is oriented to person, place, and time. She appears well-developed and well-nourished. No distress.    HENT:   Head: Normocephalic and atraumatic.       Cardiovascular: Normal rate, regular rhythm, normal heart sounds and intact distal pulses.     Pulmonary/Chest: Effort normal. No respiratory distress.        Abdominal: Soft. Bowel sounds are normal. She exhibits no mass. Abdominal incision: c/d/i. There is no guarding.   Firm fundus below umbilicus     Genitourinary: Uterus normal. No vaginal discharge found.           Musculoskeletal: Normal range of motion and moves all extremeties.       Neurological: She is alert and oriented to person, place, and time.    Skin: Skin is warm. She is not diaphoretic.    Psychiatric: She has a normal mood and affect. Her behavior is normal. Judgment and thought content normal.       Assessment/Plan:     28 y.o. female  for:    *  delivery, delivered, current hospitalization    Meets criteria for early DC  Patient has been on bowel regimen of colace and Miralax  She is very worried about having a bowel movement prior to DC - reassured patient  Patient requested Mag citrate - ordered         Disposition: As patient meets milestones, will plan to discharge today versus early tomorrow.    Delma Henderson MD  Obstetrics  [facility]

## 2018-12-04 ENCOUNTER — POSTPARTUM VISIT (OUTPATIENT)
Dept: OBSTETRICS AND GYNECOLOGY | Facility: CLINIC | Age: 28
End: 2018-12-04
Payer: COMMERCIAL

## 2018-12-04 VITALS
DIASTOLIC BLOOD PRESSURE: 78 MMHG | BODY MASS INDEX: 31.15 KG/M2 | WEIGHT: 198.44 LBS | HEIGHT: 67 IN | SYSTOLIC BLOOD PRESSURE: 110 MMHG

## 2018-12-04 DIAGNOSIS — Z01.818 PREOP EXAMINATION: Primary | ICD-10-CM

## 2018-12-04 PROCEDURE — 99999 PR PBB SHADOW E&M-EST. PATIENT-LVL III: CPT | Mod: PBBFAC,,, | Performed by: OBSTETRICS & GYNECOLOGY

## 2018-12-04 PROCEDURE — 99024 POSTOP FOLLOW-UP VISIT: CPT | Mod: S$GLB,,, | Performed by: OBSTETRICS & GYNECOLOGY

## 2018-12-04 NOTE — PROGRESS NOTES
CC 2 week PP visit    Subjective:   Patient reports no nausea or vomiting.  Has URI and pink eye on ABX and eye drops  Activity level: Normal.    Pain control: Well controlled.    Reports no postpartum depression, overall doing well.     Objective:  Vitals:    18 1006   BP: 110/78     General appearance: Comfortable and well-appearing.    Abdomen: Abdomen is soft, non distended   Tenderness: There is no abdominal tenderness.    Wound:  Clean.  There is no dehiscence.  There is no drainage.      Assessment:   s/p  delivery- 2 week post op  Condition: In stable condition.     Plan:  Encourage ambulation.  Continue wound care.  Pelvic rest for 6 weeks postpartum.

## 2018-12-05 ENCOUNTER — DOCUMENTATION ONLY (OUTPATIENT)
Dept: REHABILITATION | Facility: HOSPITAL | Age: 28
End: 2018-12-05

## 2018-12-05 PROBLEM — M53.3 SACROILIAC PAIN DURING PREGNANCY: Status: RESOLVED | Noted: 2018-09-13 | Resolved: 2018-12-05

## 2018-12-05 PROBLEM — O99.891 SACROILIAC PAIN DURING PREGNANCY: Status: RESOLVED | Noted: 2018-09-13 | Resolved: 2018-12-05

## 2018-12-05 NOTE — PROGRESS NOTES
Mrs. 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  She was taught SIJ METs, educated in SIJ belt use, and performed SIJ stabilization exercises modified due to her pregnancy state.  Good relief and ability to manage her L SIJ pain with PT.  Discharged from PT at this time due to the pending the birth of her child.     WHITNEY ReinaT

## 2019-01-04 ENCOUNTER — POSTPARTUM VISIT (OUTPATIENT)
Dept: OBSTETRICS AND GYNECOLOGY | Facility: CLINIC | Age: 29
End: 2019-01-04
Payer: COMMERCIAL

## 2019-01-04 VITALS
HEIGHT: 67 IN | DIASTOLIC BLOOD PRESSURE: 64 MMHG | WEIGHT: 198.44 LBS | BODY MASS INDEX: 31.15 KG/M2 | SYSTOLIC BLOOD PRESSURE: 120 MMHG

## 2019-01-04 DIAGNOSIS — Z30.017 NEXPLANON INSERTION: ICD-10-CM

## 2019-01-04 PROCEDURE — 0503F PR POSTPARTUM CARE VISIT: ICD-10-PCS | Mod: S$GLB,,, | Performed by: OBSTETRICS & GYNECOLOGY

## 2019-01-04 PROCEDURE — 0503F POSTPARTUM CARE VISIT: CPT | Mod: S$GLB,,, | Performed by: OBSTETRICS & GYNECOLOGY

## 2019-01-04 PROCEDURE — 99999 PR PBB SHADOW E&M-EST. PATIENT-LVL III: CPT | Mod: PBBFAC,,, | Performed by: OBSTETRICS & GYNECOLOGY

## 2019-01-04 PROCEDURE — 99999 PR PBB SHADOW E&M-EST. PATIENT-LVL III: ICD-10-PCS | Mod: PBBFAC,,, | Performed by: OBSTETRICS & GYNECOLOGY

## 2019-01-04 NOTE — PROGRESS NOTES
Subjective:      Leydi Matias is a 28 y.o.  who presents for a postpartum visit.    She is status post   delivery secondary to prev cs 6 weeks ago.      Review the Delivery Report for details.     Her hospitalization was not complicated.    She is not breastfeeding.    She desires Nexplanon  for contraception. Will do one month of OCP  She denies abdominal pain signs and symptoms of postpartum depression.    Her last pap was normal on 2018     Past Medical History:   Diagnosis Date    Acid reflux     Herpes simplex virus (HSV) infection     Previous  delivery affecting pregnancy 2018       Current Outpatient Medications:     valACYclovir (VALTREX) 1000 MG tablet, Take 1 tablet (1,000 mg total) by mouth 2 (two) times daily., Disp: 60 tablet, Rfl: 6      Objective:     General: healthy, alert, no distress  Abdomen: Abdomen soft, nontender. no masses,  no hernias  External Genitalia: normal, well-healed, without lesions or masses  Vagina: normal, well-healed, physiologic discharge, without lesions  Cervix: normal, well-healed, without lesions  Uterus: normal size, well involuted, firm, non-tender  Adnexa: no masses palpable and nontender    Assessment:     Encounter for postpartum visit   Patient desires Nexplanon.  One month of OCPs given to patient.  She will call when she is on her menstrual cycle so that we can schedule for 4 weeks later when she is on placebos.      Plan:     1. Return to clinic in for annual exam in 6 months and Nexplanon when approp

## 2019-01-08 ENCOUNTER — TELEPHONE (OUTPATIENT)
Dept: OBSTETRICS AND GYNECOLOGY | Facility: CLINIC | Age: 29
End: 2019-01-08

## 2019-01-28 ENCOUNTER — TELEPHONE (OUTPATIENT)
Dept: OBSTETRICS AND GYNECOLOGY | Facility: CLINIC | Age: 29
End: 2019-01-28

## 2019-01-28 NOTE — LETTER
2019    Leydi Matias  [address]  [place] [address]         [facility]  [address]  [place] [address]  Phone: [phone]  Fax: [phone] 2019     Patient: Leydi Matias   YOB: 1990           To Whom It May Concern:     Leydi Matias's underwent a  section on 18. Her maternity leave will be from 19 to 19. She may return on 19 on full duty with no restrictions.  If you have any questions or concerns, please don't hesitate to call.    Sincerely,    Robert Shields M.D.

## 2019-01-28 NOTE — TELEPHONE ENCOUNTER
Pt was calling to see if she can get a letter signed by  stating she is extending her maternity leave from 1/12/19 to 2/11/19.

## 2019-02-05 ENCOUNTER — TELEPHONE (OUTPATIENT)
Dept: OBSTETRICS AND GYNECOLOGY | Facility: CLINIC | Age: 29
End: 2019-02-05

## 2019-02-05 NOTE — TELEPHONE ENCOUNTER
Patient needs corrected note for work. Note says to return  next week and that is incorrect. Please call patient back today

## 2019-02-05 NOTE — LETTER
February 6, 2019    Leydi Matias  [address]  [place] [address]         [facility]  [address]  [place] [address]  Phone: [phone]  Fax: [phone] February 6, 2019     Patient: Leydi Matias   YOB: 1990           To Whom It May Concern:     Leydi Matias may return to work on full duty on 2-5-19.    If you have any questions or concerns, please don't hesitate to call.    Sincerely,        Robert Shields M.D.

## 2019-05-16 ENCOUNTER — OFFICE VISIT (OUTPATIENT)
Dept: URGENT CARE | Facility: CLINIC | Age: 29
End: 2019-05-16
Payer: COMMERCIAL

## 2019-05-16 VITALS
HEIGHT: 67 IN | SYSTOLIC BLOOD PRESSURE: 118 MMHG | BODY MASS INDEX: 33.43 KG/M2 | WEIGHT: 213 LBS | TEMPERATURE: 98 F | OXYGEN SATURATION: 97 % | DIASTOLIC BLOOD PRESSURE: 82 MMHG | HEART RATE: 121 BPM | RESPIRATION RATE: 16 BRPM

## 2019-05-16 DIAGNOSIS — H66.002 ACUTE SUPPURATIVE OTITIS MEDIA OF LEFT EAR WITHOUT SPONTANEOUS RUPTURE OF TYMPANIC MEMBRANE, RECURRENCE NOT SPECIFIED: ICD-10-CM

## 2019-05-16 DIAGNOSIS — R52 BODY ACHES: Primary | ICD-10-CM

## 2019-05-16 LAB
CTP QC/QA: YES
FLUAV AG NPH QL: NEGATIVE
FLUBV AG NPH QL: NEGATIVE

## 2019-05-16 PROCEDURE — 87804 INFLUENZA ASSAY W/OPTIC: CPT | Mod: 59,QW,S$GLB, | Performed by: INTERNAL MEDICINE

## 2019-05-16 PROCEDURE — 96372 PR INJECTION,THERAP/PROPH/DIAG2ST, IM OR SUBCUT: ICD-10-PCS | Mod: S$GLB,,, | Performed by: INTERNAL MEDICINE

## 2019-05-16 PROCEDURE — 87804 POCT INFLUENZA A/B: ICD-10-PCS | Mod: 59,QW,S$GLB, | Performed by: INTERNAL MEDICINE

## 2019-05-16 PROCEDURE — 3008F PR BODY MASS INDEX (BMI) DOCUMENTED: ICD-10-PCS | Mod: CPTII,S$GLB,, | Performed by: INTERNAL MEDICINE

## 2019-05-16 PROCEDURE — 3008F BODY MASS INDEX DOCD: CPT | Mod: CPTII,S$GLB,, | Performed by: INTERNAL MEDICINE

## 2019-05-16 PROCEDURE — 96372 THER/PROPH/DIAG INJ SC/IM: CPT | Mod: S$GLB,,, | Performed by: INTERNAL MEDICINE

## 2019-05-16 PROCEDURE — 99214 OFFICE O/P EST MOD 30 MIN: CPT | Mod: 25,S$GLB,, | Performed by: INTERNAL MEDICINE

## 2019-05-16 PROCEDURE — 99214 PR OFFICE/OUTPT VISIT, EST, LEVL IV, 30-39 MIN: ICD-10-PCS | Mod: 25,S$GLB,, | Performed by: INTERNAL MEDICINE

## 2019-05-16 RX ORDER — AMOXICILLIN AND CLAVULANATE POTASSIUM 875; 125 MG/1; MG/1
1 TABLET, FILM COATED ORAL EVERY 12 HOURS
Qty: 20 TABLET | Refills: 0 | Status: SHIPPED | OUTPATIENT
Start: 2019-05-16 | End: 2019-05-26

## 2019-05-16 RX ORDER — BETAMETHASONE SODIUM PHOSPHATE AND BETAMETHASONE ACETATE 3; 3 MG/ML; MG/ML
9 INJECTION, SUSPENSION INTRA-ARTICULAR; INTRALESIONAL; INTRAMUSCULAR; SOFT TISSUE ONCE
Status: COMPLETED | OUTPATIENT
Start: 2019-05-16 | End: 2019-05-16

## 2019-05-16 RX ADMIN — BETAMETHASONE SODIUM PHOSPHATE AND BETAMETHASONE ACETATE 9 MG: 3; 3 INJECTION, SUSPENSION INTRA-ARTICULAR; INTRALESIONAL; INTRAMUSCULAR; SOFT TISSUE at 11:05

## 2019-05-16 NOTE — PROGRESS NOTES
"Subjective:       Patient ID: Leydi Matias is a 29 y.o. female.    Vitals:  height is 5' 7" (1.702 m) and weight is 96.6 kg (213 lb). Her oral temperature is 98 °F (36.7 °C). Her blood pressure is 118/82 and her pulse is 121 (abnormal). Her respiration is 16 and oxygen saturation is 97%.     Chief Complaint: URI    URI    This is a new problem. The current episode started in the past 7 days. The problem has been gradually worsening. Associated symptoms include congestion, coughing and ear pain. Pertinent negatives include no nausea, rash, sinus pain, sore throat, vomiting or wheezing. Treatments tried: Advil, Mucinex. The treatment provided no relief.       Constitution: Positive for chills and fatigue. Negative for sweating and fever.   HENT: Positive for ear pain and congestion. Negative for sinus pain, sinus pressure, sore throat and voice change.    Neck: Negative for painful lymph nodes.   Eyes: Negative for eye redness.   Respiratory: Positive for cough. Negative for chest tightness, sputum production, bloody sputum, COPD, shortness of breath, stridor, wheezing and asthma.    Gastrointestinal: Negative for nausea and vomiting.   Musculoskeletal: Positive for muscle ache.   Skin: Negative for rash.   Allergic/Immunologic: Negative for seasonal allergies and asthma.   Hematologic/Lymphatic: Negative for swollen lymph nodes.       Objective:      Physical Exam   Constitutional: She appears well-developed and well-nourished.   HENT:   Head: Normocephalic and atraumatic.   Right Ear: Hearing, tympanic membrane, external ear and ear canal normal.   Left Ear: Tympanic membrane is injected, erythematous and retracted. A middle ear effusion is present. Decreased hearing is noted.   Nose: Mucosal edema present.   Mouth/Throat: Posterior oropharyngeal edema and posterior oropharyngeal erythema present.   Eyes: Pupils are equal, round, and reactive to light. Conjunctivae and EOM are normal.   Neck: Normal range of " motion. Neck supple.   Cardiovascular: Normal rate and regular rhythm.   Pulmonary/Chest: Effort normal and breath sounds normal.   Nursing note and vitals reviewed.      Assessment:       1. Body aches    2. Acute suppurative otitis media of left ear without spontaneous rupture of tympanic membrane, recurrence not specified        Plan:         Body aches  -     POCT Influenza A/B    Acute suppurative otitis media of left ear without spontaneous rupture of tympanic membrane, recurrence not specified  -     betamethasone acetate-betamethasone sodium phosphate injection 9 mg  -     amoxicillin-clavulanate 875-125mg (AUGMENTIN) 875-125 mg per tablet; Take 1 tablet by mouth every 12 (twelve) hours. for 10 days  Dispense: 20 tablet; Refill: 0

## 2019-06-18 ENCOUNTER — OFFICE VISIT (OUTPATIENT)
Dept: URGENT CARE | Facility: CLINIC | Age: 29
End: 2019-06-18
Payer: COMMERCIAL

## 2019-06-18 VITALS
TEMPERATURE: 98 F | OXYGEN SATURATION: 98 % | SYSTOLIC BLOOD PRESSURE: 116 MMHG | WEIGHT: 213 LBS | HEIGHT: 67 IN | HEART RATE: 100 BPM | BODY MASS INDEX: 33.43 KG/M2 | DIASTOLIC BLOOD PRESSURE: 80 MMHG

## 2019-06-18 DIAGNOSIS — J32.9 VIRAL SINUSITIS: Primary | ICD-10-CM

## 2019-06-18 DIAGNOSIS — B97.89 VIRAL SINUSITIS: Primary | ICD-10-CM

## 2019-06-18 PROCEDURE — 3008F BODY MASS INDEX DOCD: CPT | Mod: CPTII,S$GLB,, | Performed by: NURSE PRACTITIONER

## 2019-06-18 PROCEDURE — 3008F PR BODY MASS INDEX (BMI) DOCUMENTED: ICD-10-PCS | Mod: CPTII,S$GLB,, | Performed by: NURSE PRACTITIONER

## 2019-06-18 PROCEDURE — 96372 THER/PROPH/DIAG INJ SC/IM: CPT | Mod: S$GLB,,, | Performed by: EMERGENCY MEDICINE

## 2019-06-18 PROCEDURE — 99214 OFFICE O/P EST MOD 30 MIN: CPT | Mod: 25,S$GLB,, | Performed by: NURSE PRACTITIONER

## 2019-06-18 PROCEDURE — 96372 PR INJECTION,THERAP/PROPH/DIAG2ST, IM OR SUBCUT: ICD-10-PCS | Mod: S$GLB,,, | Performed by: EMERGENCY MEDICINE

## 2019-06-18 PROCEDURE — 99214 PR OFFICE/OUTPT VISIT, EST, LEVL IV, 30-39 MIN: ICD-10-PCS | Mod: 25,S$GLB,, | Performed by: NURSE PRACTITIONER

## 2019-06-18 RX ORDER — BETAMETHASONE SODIUM PHOSPHATE AND BETAMETHASONE ACETATE 3; 3 MG/ML; MG/ML
9 INJECTION, SUSPENSION INTRA-ARTICULAR; INTRALESIONAL; INTRAMUSCULAR; SOFT TISSUE
Status: COMPLETED | OUTPATIENT
Start: 2019-06-18 | End: 2019-06-18

## 2019-06-18 RX ORDER — FLUTICASONE PROPIONATE 50 MCG
1 SPRAY, SUSPENSION (ML) NASAL DAILY
Qty: 1 BOTTLE | Refills: 0 | Status: SHIPPED | OUTPATIENT
Start: 2019-06-18 | End: 2020-02-20

## 2019-06-18 RX ADMIN — BETAMETHASONE SODIUM PHOSPHATE AND BETAMETHASONE ACETATE 9 MG: 3; 3 INJECTION, SUSPENSION INTRA-ARTICULAR; INTRALESIONAL; INTRAMUSCULAR; SOFT TISSUE at 04:06

## 2019-06-18 NOTE — PATIENT INSTRUCTIONS
Sinusitis (No Antibiotics)    The sinuses are air-filled spaces within the bones of the face. They connect to the inside of the nose. Sinusitis is an inflammation of the tissue lining the sinus cavity. Sinus inflammation can occur during a cold. It can also be due to allergies to pollens and other particles in the air. It can cause symptoms such as sinus congestion, headache, sore throat, facial swelling and fullness. It may also cause a low-grade fever. No infection is present, and no antibiotic treatment is needed.  Home care  · Drink plenty of water, hot tea, and other liquids. This may help thin mucus. It also may promote sinus drainage.  · Heat may help soothe painful areas of the face. Use a towel soaked in hot water. Or,  the shower and direct the hot spray onto your face. Using a vaporizer along with a menthol rub at night may also help.   · An expectorant containing guaifenesin may help thin the mucus and promote drainage from the sinuses.  · Over-the-counter decongestants may be used unless a similar medicine was prescribed. Nasal sprays work the fastest. Use one that contains phenylephrine or oxymetazoline. First blow the nose gently. Then use the spray. Do not use these medicines more often than directed on the label or symptoms may get worse. You may also use tablets containing pseudoephedrine. Avoid products that combine ingredients, because side effects may be increased. Read labels. You can also ask the pharmacist for help. (NOTE: Persons with high blood pressure should not use decongestants. They can raise blood pressure.)  · Over-the-counter antihistamines may help if allergies contributed to your sinusitis.    · Use acetaminophen or ibuprofen to control pain, unless another pain medicine was prescribed. (If you have chronic liver or kidney disease or ever had a stomach ulcer, talk with your doctor before using these medicines. Aspirin should never be used in anyone under 18 years of age  who is ill with a fever. It may cause severe liver damage.)  · Use nasal rinses or irrigation as instructed by your health care provider.  · Don't smoke. This can worsen symptoms.  Follow-up care  Follow up with your healthcare provider or our staff if you are not improving within the next week.  When to seek medical advice  Call your healthcare provider if any of these occur:  · Green or yellow discharge from the nose or into the throat  · Facial pain or headache becoming more severe  · Stiff neck  · Unusual drowsiness or confusion  · Swelling of the forehead or eyelids  · Vision problems, including blurred or double vision  · Fever of 100.4ºF (38ºC) or higher, or as directed by your healthcare provider  · Seizure  · Breathing problems  · Symptoms not resolving within 10 days  Date Last Reviewed: 4/13/2015  © 0072-9419 [employer]. [address] [place] [address]. All rights reserved. This information is not intended as a substitute for professional medical care. Always follow your healthcare professional's instructions.      -Steroid shot given here today.  -Flonase daily.  -Claritin-D for the next 5-7 days.  -Increase your fluid intake.  Please follow up with your Primary care provider within 2-5 days if your signs and symptoms have not resolved or worsen.     If your condition worsens or fails to improve we recommend that you receive another evaluation at the emergency room immediately or contact your primary medical clinic to discuss your concerns.   You must understand that you have received an Urgent Care treatment only and that you may be released before all of your medical problems are known or treated. You, the patient, will arrange for follow up care as instructed.

## 2019-06-18 NOTE — PROGRESS NOTES
"Subjective:       Patient ID: Leydi Matias is a 29 y.o. female.    Vitals:  height is 5' 7" (1.702 m) and weight is 96.6 kg (213 lb). Her oral temperature is 98.1 °F (36.7 °C). Her blood pressure is 116/80 and her pulse is 100. Her oxygen saturation is 98%.     Chief Complaint: URI    Patient presents to clinic today with complaints of worsening sinus issues over the last 24 hr.  Patient reports that she has been working to remodel home over the last few weeks.  Patient does have a history of chronic allergies.  Patient has not been taking anything over-the-counter.  Denies any fever or chills.    URI    This is a new problem. The current episode started yesterday. The problem has been gradually worsening. There has been no fever. Associated symptoms include congestion, coughing, headaches, a plugged ear sensation, rhinorrhea, sneezing and a sore throat. Pertinent negatives include no ear pain, nausea, rash, sinus pain, vomiting or wheezing. She has tried nothing for the symptoms.       Constitution: Negative for chills, sweating, fatigue and fever.   HENT: Positive for congestion, postnasal drip and sore throat. Negative for ear pain, sinus pain, sinus pressure and voice change.    Neck: Negative for painful lymph nodes.   Eyes: Negative for eye redness.   Respiratory: Positive for cough and sputum production. Negative for chest tightness, bloody sputum, COPD, shortness of breath, stridor, wheezing and asthma.    Gastrointestinal: Negative for nausea and vomiting.   Musculoskeletal: Negative for muscle ache.   Skin: Negative for rash.   Allergic/Immunologic: Positive for sneezing. Negative for seasonal allergies and asthma.   Neurological: Positive for headaches.   Hematologic/Lymphatic: Negative for swollen lymph nodes.       Objective:      Physical Exam   Constitutional: She is oriented to person, place, and time. She appears well-developed and well-nourished. She is cooperative.  Non-toxic appearance. She " does not appear ill. No distress.   HENT:   Head: Normocephalic and atraumatic.   Right Ear: Hearing, external ear and ear canal normal. Tympanic membrane is injected.   Left Ear: Hearing, external ear and ear canal normal. Tympanic membrane is injected.   Nose: Mucosal edema and rhinorrhea present. No nasal deformity. No epistaxis. Right sinus exhibits no maxillary sinus tenderness and no frontal sinus tenderness. Left sinus exhibits no maxillary sinus tenderness and no frontal sinus tenderness.   Mouth/Throat: Uvula is midline and mucous membranes are normal. No trismus in the jaw. Normal dentition. No uvula swelling. Posterior oropharyngeal erythema present. Tonsils are 1+ on the right. Tonsils are 1+ on the left. No tonsillar exudate.   Eyes: Conjunctivae and lids are normal. No scleral icterus.   Sclera clear bilat   Neck: Trachea normal, full passive range of motion without pain and phonation normal. Neck supple.   Cardiovascular: Normal rate, regular rhythm, normal heart sounds, intact distal pulses and normal pulses.   Pulmonary/Chest: Effort normal and breath sounds normal. No respiratory distress.   Abdominal: Soft. Normal appearance and bowel sounds are normal. She exhibits no distension. There is no tenderness.   Musculoskeletal: Normal range of motion. She exhibits no edema or deformity.   Neurological: She is alert and oriented to person, place, and time. She exhibits normal muscle tone. Coordination normal.   Skin: Skin is warm, dry and intact. She is not diaphoretic. No pallor.   Psychiatric: She has a normal mood and affect. Her speech is normal and behavior is normal. Judgment and thought content normal. Cognition and memory are normal.   Nursing note and vitals reviewed.      Assessment:       1. Viral sinusitis        Plan:         Viral sinusitis  -     betamethasone acetate-betamethasone sodium phosphate injection 9 mg  -     fluticasone propionate (FLONASE) 50 mcg/actuation nasal spray; 1  spray (50 mcg total) by Each Nare route once daily.  Dispense: 1 Bottle; Refill: 0      Patient Instructions     Sinusitis (No Antibiotics)    The sinuses are air-filled spaces within the bones of the face. They connect to the inside of the nose. Sinusitis is an inflammation of the tissue lining the sinus cavity. Sinus inflammation can occur during a cold. It can also be due to allergies to pollens and other particles in the air. It can cause symptoms such as sinus congestion, headache, sore throat, facial swelling and fullness. It may also cause a low-grade fever. No infection is present, and no antibiotic treatment is needed.  Home care  · Drink plenty of water, hot tea, and other liquids. This may help thin mucus. It also may promote sinus drainage.  · Heat may help soothe painful areas of the face. Use a towel soaked in hot water. Or,  the shower and direct the hot spray onto your face. Using a vaporizer along with a menthol rub at night may also help.   · An expectorant containing guaifenesin may help thin the mucus and promote drainage from the sinuses.  · Over-the-counter decongestants may be used unless a similar medicine was prescribed. Nasal sprays work the fastest. Use one that contains phenylephrine or oxymetazoline. First blow the nose gently. Then use the spray. Do not use these medicines more often than directed on the label or symptoms may get worse. You may also use tablets containing pseudoephedrine. Avoid products that combine ingredients, because side effects may be increased. Read labels. You can also ask the pharmacist for help. (NOTE: Persons with high blood pressure should not use decongestants. They can raise blood pressure.)  · Over-the-counter antihistamines may help if allergies contributed to your sinusitis.    · Use acetaminophen or ibuprofen to control pain, unless another pain medicine was prescribed. (If you have chronic liver or kidney disease or ever had a stomach ulcer,  talk with your doctor before using these medicines. Aspirin should never be used in anyone under 18 years of age who is ill with a fever. It may cause severe liver damage.)  · Use nasal rinses or irrigation as instructed by your health care provider.  · Don't smoke. This can worsen symptoms.  Follow-up care  Follow up with your healthcare provider or our staff if you are not improving within the next week.  When to seek medical advice  Call your healthcare provider if any of these occur:  · Green or yellow discharge from the nose or into the throat  · Facial pain or headache becoming more severe  · Stiff neck  · Unusual drowsiness or confusion  · Swelling of the forehead or eyelids  · Vision problems, including blurred or double vision  · Fever of 100.4ºF (38ºC) or higher, or as directed by your healthcare provider  · Seizure  · Breathing problems  · Symptoms not resolving within 10 days  Date Last Reviewed: 4/13/2015  © 5074-1336 [employer]. [address] [place] [address]. All rights reserved. This information is not intended as a substitute for professional medical care. Always follow your healthcare professional's instructions.      -Steroid shot given here today.  -Flonase daily.  -Claritin-D for the next 5-7 days.  -Increase your fluid intake.  Please follow up with your Primary care provider within 2-5 days if your signs and symptoms have not resolved or worsen.     If your condition worsens or fails to improve we recommend that you receive another evaluation at the emergency room immediately or contact your primary medical clinic to discuss your concerns.   You must understand that you have received an Urgent Care treatment only and that you may be released before all of your medical problems are known or treated. You, the patient, will arrange for follow up care as instructed.

## 2019-10-24 ENCOUNTER — TELEPHONE (OUTPATIENT)
Dept: OBSTETRICS AND GYNECOLOGY | Facility: CLINIC | Age: 29
End: 2019-10-24

## 2019-10-24 RX ORDER — FLUCONAZOLE 150 MG/1
150 TABLET ORAL ONCE
Qty: 2 TABLET | Refills: 0 | Status: SHIPPED | OUTPATIENT
Start: 2019-10-24 | End: 2019-10-24

## 2019-10-24 NOTE — TELEPHONE ENCOUNTER
Pt thinks she has a yeast infection. C/o itching.  Requesting a rx.  Recommended an appt if no improvement after taking medication.     Diflucan pended    Annual scheduled

## 2019-10-24 NOTE — TELEPHONE ENCOUNTER
Bone pt - pt thinks she has a yeast infection and would like to see if she can get something sent into the pharm.   - [facility] [phone]

## 2020-02-17 ENCOUNTER — OFFICE VISIT (OUTPATIENT)
Dept: URGENT CARE | Facility: CLINIC | Age: 30
End: 2020-02-17
Payer: COMMERCIAL

## 2020-02-17 ENCOUNTER — TELEPHONE (OUTPATIENT)
Dept: URGENT CARE | Facility: CLINIC | Age: 30
End: 2020-02-17

## 2020-02-17 VITALS
SYSTOLIC BLOOD PRESSURE: 129 MMHG | HEIGHT: 67 IN | WEIGHT: 195 LBS | HEART RATE: 87 BPM | DIASTOLIC BLOOD PRESSURE: 88 MMHG | OXYGEN SATURATION: 99 % | TEMPERATURE: 99 F | BODY MASS INDEX: 30.61 KG/M2

## 2020-02-17 DIAGNOSIS — J30.89 NON-SEASONAL ALLERGIC RHINITIS, UNSPECIFIED TRIGGER: ICD-10-CM

## 2020-02-17 DIAGNOSIS — J01.90 ACUTE NON-RECURRENT SINUSITIS, UNSPECIFIED LOCATION: Primary | ICD-10-CM

## 2020-02-17 PROCEDURE — 99214 PR OFFICE/OUTPT VISIT, EST, LEVL IV, 30-39 MIN: ICD-10-PCS | Mod: 25,S$GLB,, | Performed by: FAMILY MEDICINE

## 2020-02-17 PROCEDURE — 96372 THER/PROPH/DIAG INJ SC/IM: CPT | Mod: S$GLB,,, | Performed by: FAMILY MEDICINE

## 2020-02-17 PROCEDURE — 99214 OFFICE O/P EST MOD 30 MIN: CPT | Mod: 25,S$GLB,, | Performed by: FAMILY MEDICINE

## 2020-02-17 PROCEDURE — 96372 PR INJECTION,THERAP/PROPH/DIAG2ST, IM OR SUBCUT: ICD-10-PCS | Mod: S$GLB,,, | Performed by: FAMILY MEDICINE

## 2020-02-17 RX ORDER — BETAMETHASONE SODIUM PHOSPHATE AND BETAMETHASONE ACETATE 3; 3 MG/ML; MG/ML
6 INJECTION, SUSPENSION INTRA-ARTICULAR; INTRALESIONAL; INTRAMUSCULAR; SOFT TISSUE
Status: COMPLETED | OUTPATIENT
Start: 2020-02-17 | End: 2020-02-17

## 2020-02-17 RX ORDER — AMOXICILLIN 875 MG/1
875 TABLET, FILM COATED ORAL 2 TIMES DAILY
Qty: 20 TABLET | Refills: 0 | Status: SHIPPED | OUTPATIENT
Start: 2020-02-17 | End: 2020-02-27

## 2020-02-17 RX ADMIN — BETAMETHASONE SODIUM PHOSPHATE AND BETAMETHASONE ACETATE 6 MG: 3; 3 INJECTION, SUSPENSION INTRA-ARTICULAR; INTRALESIONAL; INTRAMUSCULAR; SOFT TISSUE at 11:02

## 2020-02-17 NOTE — PROGRESS NOTES
"Subjective:       Patient ID: Leydi Matias is a 30 y.o. female.    Vitals:  height is 5' 7" (1.702 m) and weight is 88.5 kg (195 lb). Her oral temperature is 98.5 °F (36.9 °C). Her blood pressure is 129/88 and her pulse is 87. Her oxygen saturation is 99%.     Chief Complaint: URI    30 years old female with history of sinus allergies came with complaint of increasing sinus congestion and sinus pressure for last 4 days.  Also reports greenish discharge from nose.  Denies fever, chills, headache, cough, chest pain, shortness of breath.    URI    This is a new problem. The current episode started in the past 7 days (3 days ago). The problem has been gradually worsening. There has been no fever. Associated symptoms include congestion, coughing, rhinorrhea, sinus pain and sneezing. Pertinent negatives include no abdominal pain, chest pain, diarrhea, dysuria, ear pain, headaches, joint pain, joint swelling, nausea, neck pain, plugged ear sensation, rash, sore throat, swollen glands, vomiting or wheezing. She has tried nothing for the symptoms.       Constitution: Negative for chills, sweating, fatigue and fever.   HENT: Positive for congestion and sinus pain. Negative for ear pain, sinus pressure, sore throat and voice change.    Neck: Negative for neck pain and painful lymph nodes.   Cardiovascular: Negative for chest pain.   Eyes: Negative for eye redness.   Respiratory: Positive for cough. Negative for chest tightness, sputum production, bloody sputum, COPD, shortness of breath, stridor, wheezing and asthma.    Gastrointestinal: Negative for abdominal pain, nausea, vomiting and diarrhea.   Genitourinary: Negative for dysuria.   Musculoskeletal: Negative for muscle ache.   Skin: Negative for rash.   Allergic/Immunologic: Positive for sneezing. Negative for seasonal allergies and asthma.   Neurological: Negative for headaches.   Hematologic/Lymphatic: Negative for swollen lymph nodes.       Objective:      Physical " Exam   Constitutional: She is oriented to person, place, and time. She appears well-developed and well-nourished. She is cooperative.  Non-toxic appearance. She does not appear ill. No distress.   HENT:   Head: Normocephalic and atraumatic.   Right Ear: Hearing, tympanic membrane, external ear and ear canal normal.   Left Ear: Hearing, tympanic membrane, external ear and ear canal normal.   Nose: No mucosal edema, rhinorrhea or nasal deformity. No epistaxis. Right sinus exhibits no maxillary sinus tenderness and no frontal sinus tenderness. Left sinus exhibits no maxillary sinus tenderness and no frontal sinus tenderness.   Mouth/Throat: Uvula is midline, oropharynx is clear and moist and mucous membranes are normal. No trismus in the jaw. Normal dentition. No uvula swelling. No posterior oropharyngeal erythema.   Positive bilateral nasal congestion and mild erythema.   Eyes: Conjunctivae and lids are normal. Right eye exhibits no discharge. Left eye exhibits no discharge. No scleral icterus.   Neck: Trachea normal, normal range of motion, full passive range of motion without pain and phonation normal. Neck supple.   Cardiovascular: Normal rate, regular rhythm, normal heart sounds, intact distal pulses and normal pulses.   Pulmonary/Chest: Effort normal and breath sounds normal. No stridor. No respiratory distress. She has no wheezes. She has no rales.   Abdominal: Soft. Normal appearance and bowel sounds are normal. She exhibits no distension, no pulsatile midline mass and no mass. There is no tenderness.   Musculoskeletal: Normal range of motion. She exhibits no edema or deformity.   Neurological: She is alert and oriented to person, place, and time. She exhibits normal muscle tone. Coordination normal.   Skin: Skin is warm, dry, intact, not diaphoretic and not pale.   Psychiatric: She has a normal mood and affect. Her speech is normal and behavior is normal. Judgment and thought content normal. Cognition and  memory are normal.   Nursing note and vitals reviewed.        Assessment:       1. Acute non-recurrent sinusitis, unspecified location    2. Non-seasonal allergic rhinitis, unspecified trigger        Plan:         Acute non-recurrent sinusitis, unspecified location    Non-seasonal allergic rhinitis, unspecified trigger    Other orders  -     amoxicillin (AMOXIL) 875 MG tablet; Take 1 tablet (875 mg total) by mouth 2 (two) times daily. for 10 days  Dispense: 20 tablet; Refill: 0  -     betamethasone acetate-betamethasone sodium phosphate injection 6 mg    [facility] READ YOUR DISCHARGE INSTRUCTIONS ENTIRELY AS IT CONTAINS IMPORTANT INFORMATION.    Please return here or go to the Emergency Department for any concerns or worsening of condition.    If you were prescribed antibiotics, please take them to completion.    If not allergic, please take over the counter Tylenol (Acetaminophen) and/or Motrin (Ibuprofen) as directed for control of pain and/or fever.  Please follow up with your primary care doctor or specialist as needed.    If you smoke, please stop smoking.    Please return or see your primary care doctor if you develop new or worsening symptoms.     Please arrange follow up with your primary medical clinic as soon as possible. You must understand that you've received an Urgent Care treatment only and that you may be released before all of your medical problems are known or treated. You, the patient, will arrange for follow up as instructed. If your symptoms worsen or fail to improve you should go to the Emergency Room.  Sinusitis (Antibiotic Treatment)    The sinuses are air-filled spaces within the bones of the face. They connect to the inside of the nose. Sinusitis is an inflammation of the tissue lining the sinus cavity. Sinus inflammation can occur during a cold. It can also be due to allergies to pollens and other particles in the air. Sinusitis can cause symptoms of sinus congestion and fullness. A sinus  infection causes fever, headache and facial pain. There is often green or yellow drainage from the nose or into the back of the throat (post-nasal drip). You have been given antibiotics to treat this condition.  Home care:  · Take the full course of antibiotics as instructed. Do not stop taking them, even if you feel better.  · Drink plenty of water, hot tea, and other liquids. This may help thin mucus. It also may promote sinus drainage.  · Heat may help soothe painful areas of the face. Use a towel soaked in hot water. Or,  the shower and direct the hot spray onto your face. Using a vaporizer along with a menthol rub at night may also help.   · An expectorant containing guaifenesin may help thin the mucus and promote drainage from the sinuses.  · Over-the-counter decongestants may be used unless a similar medicine was prescribed. Nasal sprays work the fastest. Use one that contains phenylephrine or oxymetazoline. First blow the nose gently. Then use the spray. Do not use these medicines more often than directed on the label or symptoms may get worse. You may also use tablets containing pseudoephedrine. Avoid products that combine ingredients, because side effects may be increased. Read labels. You can also ask the pharmacist for help. (NOTE: Persons with high blood pressure should not use decongestants. They can raise blood pressure.)  · Over-the-counter antihistamines may help if allergies contributed to your sinusitis.    · Do not use nasal rinses or irrigation during an acute sinus infection, unless told to by your health care provider. Rinsing may spread the infection to other sinuses.  · Use acetaminophen or ibuprofen to control pain, unless another pain medicine was prescribed. (If you have chronic liver or kidney disease or ever had a stomach ulcer, talk with your doctor before using these medicines. Aspirin should never be used in anyone under 18 years of age who is ill with a fever. It may cause  severe liver damage.)  · Don't smoke. This can worsen symptoms.  Follow-up care  Follow up with your healthcare provider or our staff if you are not improving within the next week.  When to seek medical advice  Call your healthcare provider if any of these occur:  · Facial pain or headache becoming more severe  · Stiff neck  · Unusual drowsiness or confusion  · Swelling of the forehead or eyelids  · Vision problems, including blurred or double vision  · Fever of 100.4ºF (38ºC) or higher, or as directed by your healthcare provider  · Seizure  · Breathing problems  · Symptoms not resolving within 10 days  Date Last Reviewed: 4/13/2015 © 2000-2017 [employer]. [address] [place] [address], [place] [address]. All rights reserved. This information is not intended as a substitute for professional medical care. Always follow your healthcare professional's instructions.        Allergic Rhinitis  Allergic rhinitis is an allergic reaction that affects the nose, and often the eyes. Its often known as nasal allergies. Nasal allergies are often due to things in the environment that are breathed in. Depending what you are sensitive to, nasal allergies may occur only during certain seasons. Or they may occur year round. Common indoor allergens include house dust mites, mold, cockroaches, and pet dander. Outdoor allergens include pollen from trees, grasses, and weeds.   Symptoms include a drippy, stuffy, and itchy nose. They also include sneezing and red and itchy eyes. You may feel tired more often. Severe allergies may also affect your breathing and trigger a condition called asthma.   Tests can be done to see what allergens are affecting you. You may be referred to an allergy specialist for testing and further evaluation.  Home care  Your healthcare provider may prescribe medicines to help relieve allergy symptoms. These may include oral medicines, nasal sprays, or eye drops.  Ask your provider for advice on how to  avoid substances that you are allergic to. Below are a few tips for each type of allergen.  Pet dander:  · Do not have pets with fur and feathers.  · If you can't avoid having a pet, keep it out of your bedroom and off upholstered furniture.  Pollen:  · When pollen counts are high, keep windows of your car and home closed. If possible, use an air conditioner instead.  · Wear a filter mask when mowing or doing yard work.  House dust mites:  · Wash bedding every week in warm water and detergent and dry on a hot setting.  · Cover the mattress, box spring, and pillows with allergy covers.   · If possible, sleep in a room with no carpet, curtains, or upholstered furniture.  Cockroaches:  · Store food in sealed containers.  · Remove garbage from the home promptly.  · Fix water leaks  Mold:  · Keep humidity low by using a dehumidifier or air conditioner. Keep the dehumidifier and air conditioner clean and free of mold.  · Clean moldy areas with bleach and water.  In general:  · Vacuum once or twice a week. If possible, use a vacuum with a high-efficiency particulate air (HEPA) filter.  · Do not smoke. Avoid cigarette smoke. Cigarette smoke is an irritant that can make symptoms worse.  Follow-up care  Follow up as advised by the healthcare provider or our staff. If you were referred to an allergy specialist, make this appointment promptly.  When to seek medical advice  Call your healthcare provider right away if the following occur:  · Coughing or wheezing  · Fever greater than 100.4°F (38°C)  · Hives (raised red bumps)  · Continuing symptoms, new symptoms, or worsening symptoms  Call 911 right away if you have:  · Trouble breathing  · Severe swelling of the face or severe itching of the eyes or mouth  Date Last Reviewed: 3/1/2017  © 9694-9479 [employer]. [address] [place] [address], [place] [address]. All rights reserved. This information is not intended as a substitute for professional medical care. Always  follow your healthcare professional's instructions.

## 2020-02-17 NOTE — PROGRESS NOTES
Subjective:       Patient ID: Leydi Matias is a 30 y.o. female.    Vitals:  vitals were not taken for this visit.     Chief Complaint: URI    URI    This is a new problem. Pertinent negatives include no congestion, coughing, ear pain, nausea, rash, sinus pain, sore throat, vomiting or wheezing.       Constitution: Negative for chills, sweating, fatigue and fever.   HENT: Negative for ear pain, congestion, sinus pain, sinus pressure, sore throat and voice change.    Neck: Negative for painful lymph nodes.   Eyes: Negative for eye redness.   Respiratory: Negative for chest tightness, cough, sputum production, bloody sputum, COPD, shortness of breath, stridor, wheezing and asthma.    Gastrointestinal: Negative for nausea and vomiting.   Musculoskeletal: Negative for muscle ache.   Skin: Negative for rash.   Allergic/Immunologic: Negative for seasonal allergies and asthma.   Hematologic/Lymphatic: Negative for swollen lymph nodes.       Objective:      Physical Exam      Assessment:       No diagnosis found.    Plan:         There are no diagnoses linked to this encounter.

## 2020-02-17 NOTE — PATIENT INSTRUCTIONS
PLEASE READ YOUR DISCHARGE INSTRUCTIONS ENTIRELY AS IT CONTAINS IMPORTANT INFORMATION.    Please return here or go to the Emergency Department for any concerns or worsening of condition.    If you were prescribed antibiotics, please take them to completion.    If not allergic, please take over the counter Tylenol (Acetaminophen) and/or Motrin (Ibuprofen) as directed for control of pain and/or fever.  Please follow up with your primary care doctor or specialist as needed.    If you smoke, please stop smoking.    Please return or see your primary care doctor if you develop new or worsening symptoms.     Please arrange follow up with your primary medical clinic as soon as possible. You must understand that you've received an Urgent Care treatment only and that you may be released before all of your medical problems are known or treated. You, the patient, will arrange for follow up as instructed. If your symptoms worsen or fail to improve you should go to the Emergency Room.  Sinusitis (Antibiotic Treatment)    The sinuses are air-filled spaces within the bones of the face. They connect to the inside of the nose. Sinusitis is an inflammation of the tissue lining the sinus cavity. Sinus inflammation can occur during a cold. It can also be due to allergies to pollens and other particles in the air. Sinusitis can cause symptoms of sinus congestion and fullness. A sinus infection causes fever, headache and facial pain. There is often green or yellow drainage from the nose or into the back of the throat (post-nasal drip). You have been given antibiotics to treat this condition.  Home care:  · Take the full course of antibiotics as instructed. Do not stop taking them, even if you feel better.  · Drink plenty of water, hot tea, and other liquids. This may help thin mucus. It also may promote sinus drainage.  · Heat may help soothe painful areas of the face. Use a towel soaked in hot water. Or,  the shower and direct  the hot spray onto your face. Using a vaporizer along with a menthol rub at night may also help.   · An expectorant containing guaifenesin may help thin the mucus and promote drainage from the sinuses.  · Over-the-counter decongestants may be used unless a similar medicine was prescribed. Nasal sprays work the fastest. Use one that contains phenylephrine or oxymetazoline. First blow the nose gently. Then use the spray. Do not use these medicines more often than directed on the label or symptoms may get worse. You may also use tablets containing pseudoephedrine. Avoid products that combine ingredients, because side effects may be increased. Read labels. You can also ask the pharmacist for help. (NOTE: Persons with high blood pressure should not use decongestants. They can raise blood pressure.)  · Over-the-counter antihistamines may help if allergies contributed to your sinusitis.    · Do not use nasal rinses or irrigation during an acute sinus infection, unless told to by your health care provider. Rinsing may spread the infection to other sinuses.  · Use acetaminophen or ibuprofen to control pain, unless another pain medicine was prescribed. (If you have chronic liver or kidney disease or ever had a stomach ulcer, talk with your doctor before using these medicines. Aspirin should never be used in anyone under 18 years of age who is ill with a fever. It may cause severe liver damage.)  · Don't smoke. This can worsen symptoms.  Follow-up care  Follow up with your healthcare provider or our staff if you are not improving within the next week.  When to seek medical advice  Call your healthcare provider if any of these occur:  · Facial pain or headache becoming more severe  · Stiff neck  · Unusual drowsiness or confusion  · Swelling of the forehead or eyelids  · Vision problems, including blurred or double vision  · Fever of 100.4ºF (38ºC) or higher, or as directed by your healthcare provider  · Seizure  · Breathing  problems  · Symptoms not resolving within 10 days  Date Last Reviewed: 4/13/2015  © 1417-2064 [employer]. [address] [place] [address], [place] [address]. All rights reserved. This information is not intended as a substitute for professional medical care. Always follow your healthcare professional's instructions.        Allergic Rhinitis  Allergic rhinitis is an allergic reaction that affects the nose, and often the eyes. Its often known as nasal allergies. Nasal allergies are often due to things in the environment that are breathed in. Depending what you are sensitive to, nasal allergies may occur only during certain seasons. Or they may occur year round. Common indoor allergens include house dust mites, mold, cockroaches, and pet dander. Outdoor allergens include pollen from trees, grasses, and weeds.   Symptoms include a drippy, stuffy, and itchy nose. They also include sneezing and red and itchy eyes. You may feel tired more often. Severe allergies may also affect your breathing and trigger a condition called asthma.   Tests can be done to see what allergens are affecting you. You may be referred to an allergy specialist for testing and further evaluation.  Home care  Your healthcare provider may prescribe medicines to help relieve allergy symptoms. These may include oral medicines, nasal sprays, or eye drops.  Ask your provider for advice on how to avoid substances that you are allergic to. Below are a few tips for each type of allergen.  Pet dander:  · Do not have pets with fur and feathers.  · If you can't avoid having a pet, keep it out of your bedroom and off upholstered furniture.  Pollen:  · When pollen counts are high, keep windows of your car and home closed. If possible, use an air conditioner instead.  · Wear a filter mask when mowing or doing yard work.  House dust mites:  · Wash bedding every week in warm water and detergent and dry on a hot setting.  · Cover the mattress, box spring, and  pillows with allergy covers.   · If possible, sleep in a room with no carpet, curtains, or upholstered furniture.  Cockroaches:  · Store food in sealed containers.  · Remove garbage from the home promptly.  · Fix water leaks  Mold:  · Keep humidity low by using a dehumidifier or air conditioner. Keep the dehumidifier and air conditioner clean and free of mold.  · Clean moldy areas with bleach and water.  In general:  · Vacuum once or twice a week. If possible, use a vacuum with a high-efficiency particulate air (HEPA) filter.  · Do not smoke. Avoid cigarette smoke. Cigarette smoke is an irritant that can make symptoms worse.  Follow-up care  Follow up as advised by the healthcare provider or our staff. If you were referred to an allergy specialist, make this appointment promptly.  When to seek medical advice  Call your healthcare provider right away if the following occur:  · Coughing or wheezing  · Fever greater than 100.4°F (38°C)  · Hives (raised red bumps)  · Continuing symptoms, new symptoms, or worsening symptoms  Call 911 right away if you have:  · Trouble breathing  · Severe swelling of the face or severe itching of the eyes or mouth  Date Last Reviewed: 3/1/2017  © 8184-3881 [employer]. [address] [place] [address], [place] [address]. All rights reserved. This information is not intended as a substitute for professional medical care. Always follow your healthcare professional's instructions.

## 2020-02-18 NOTE — TELEPHONE ENCOUNTER
I called pharmacy after patient informed me that the pharmacy did not have her prescription. The pharamist look the order verbal.

## 2020-02-20 ENCOUNTER — OFFICE VISIT (OUTPATIENT)
Dept: OBSTETRICS AND GYNECOLOGY | Facility: CLINIC | Age: 30
End: 2020-02-20
Payer: COMMERCIAL

## 2020-02-20 ENCOUNTER — LAB VISIT (OUTPATIENT)
Dept: LAB | Facility: HOSPITAL | Age: 30
End: 2020-02-20
Attending: OBSTETRICS & GYNECOLOGY
Payer: COMMERCIAL

## 2020-02-20 VITALS
HEIGHT: 67 IN | WEIGHT: 220.44 LBS | SYSTOLIC BLOOD PRESSURE: 115 MMHG | BODY MASS INDEX: 34.6 KG/M2 | DIASTOLIC BLOOD PRESSURE: 70 MMHG

## 2020-02-20 DIAGNOSIS — Z01.419 ENCOUNTER FOR GYNECOLOGICAL EXAMINATION: Primary | ICD-10-CM

## 2020-02-20 DIAGNOSIS — R63.5 WEIGHT GAIN: ICD-10-CM

## 2020-02-20 LAB
25(OH)D3+25(OH)D2 SERPL-MCNC: 15 NG/ML (ref 30–96)
BASOPHILS # BLD AUTO: 0.03 K/UL (ref 0–0.2)
BASOPHILS NFR BLD: 0.4 % (ref 0–1.9)
DIFFERENTIAL METHOD: ABNORMAL
EOSINOPHIL # BLD AUTO: 0.3 K/UL (ref 0–0.5)
EOSINOPHIL NFR BLD: 3.5 % (ref 0–8)
ERYTHROCYTE [DISTWIDTH] IN BLOOD BY AUTOMATED COUNT: 16.5 % (ref 11.5–14.5)
ESTIMATED AVG GLUCOSE: 105 MG/DL (ref 68–131)
HBA1C MFR BLD HPLC: 5.3 % (ref 4–5.6)
HCT VFR BLD AUTO: 40.6 % (ref 37–48.5)
HGB BLD-MCNC: 11.7 G/DL (ref 12–16)
IMM GRANULOCYTES # BLD AUTO: 0.02 K/UL (ref 0–0.04)
IMM GRANULOCYTES NFR BLD AUTO: 0.3 % (ref 0–0.5)
LYMPHOCYTES # BLD AUTO: 2 K/UL (ref 1–4.8)
LYMPHOCYTES NFR BLD: 26.4 % (ref 18–48)
MCH RBC QN AUTO: 24 PG (ref 27–31)
MCHC RBC AUTO-ENTMCNC: 28.8 G/DL (ref 32–36)
MCV RBC AUTO: 83 FL (ref 82–98)
MONOCYTES # BLD AUTO: 0.4 K/UL (ref 0.3–1)
MONOCYTES NFR BLD: 5.7 % (ref 4–15)
NEUTROPHILS # BLD AUTO: 4.8 K/UL (ref 1.8–7.7)
NEUTROPHILS NFR BLD: 63.7 % (ref 38–73)
NRBC BLD-RTO: 0 /100 WBC
PLATELET # BLD AUTO: 205 K/UL (ref 150–350)
PMV BLD AUTO: 11.2 FL (ref 9.2–12.9)
RBC # BLD AUTO: 4.88 M/UL (ref 4–5.4)
T4 FREE SERPL-MCNC: 1.02 NG/DL (ref 0.71–1.51)
TSH SERPL DL<=0.005 MIU/L-ACNC: 2.06 UIU/ML (ref 0.4–4)
WBC # BLD AUTO: 7.49 K/UL (ref 3.9–12.7)

## 2020-02-20 PROCEDURE — 84443 ASSAY THYROID STIM HORMONE: CPT

## 2020-02-20 PROCEDURE — 84439 ASSAY OF FREE THYROXINE: CPT

## 2020-02-20 PROCEDURE — 85025 COMPLETE CBC W/AUTO DIFF WBC: CPT

## 2020-02-20 PROCEDURE — 99999 PR PBB SHADOW E&M-EST. PATIENT-LVL III: CPT | Mod: PBBFAC,,, | Performed by: OBSTETRICS & GYNECOLOGY

## 2020-02-20 PROCEDURE — 82306 VITAMIN D 25 HYDROXY: CPT

## 2020-02-20 PROCEDURE — 99395 PR PREVENTIVE VISIT,EST,18-39: ICD-10-PCS | Mod: S$GLB,,, | Performed by: OBSTETRICS & GYNECOLOGY

## 2020-02-20 PROCEDURE — 99999 PR PBB SHADOW E&M-EST. PATIENT-LVL III: ICD-10-PCS | Mod: PBBFAC,,, | Performed by: OBSTETRICS & GYNECOLOGY

## 2020-02-20 PROCEDURE — 99395 PREV VISIT EST AGE 18-39: CPT | Mod: S$GLB,,, | Performed by: OBSTETRICS & GYNECOLOGY

## 2020-02-20 PROCEDURE — 83036 HEMOGLOBIN GLYCOSYLATED A1C: CPT

## 2020-02-20 NOTE — PROGRESS NOTES
Chief Complaint: well woman exam  Well Woman (Annual Exam, Last pap/ hpv 2018 negative)      Leydi Matias is a 30 y.o. female  presents for a well woman exam.    She is established.  No complaints.  Patient reports that she has been following the cleaning diet is and has been exercising 3 to 5 times a week.  She is feeling much better but has not noticed any difference the scale.    Cycles:  regular and monthly  LMP: Patient's last menstrual period was 2020..    Contraception: The current method of family planning is none.  Last pap: 2018 normal HPV negative  Family history of breast cancer.  Mom diagnosed in her late 40s.  Will initiate mammograms yearly at the age of 33.      Medication List with Changes/Refills   Current Medications    AMOXICILLIN (AMOXIL) 875 MG TABLET    Take 1 tablet (875 mg total) by mouth 2 (two) times daily. for 10 days   Discontinued Medications    FLUTICASONE PROPIONATE (FLONASE) 50 MCG/ACTUATION NASAL SPRAY    1 spray (50 mcg total) by Each Nare route once daily.    VALACYCLOVIR (VALTREX) 1000 MG TABLET    Take 1 tablet (1,000 mg total) by mouth 2 (two) times daily.       Past Medical History:   Diagnosis Date    Acid reflux     Family history of breast cancer in mother     Herpes simplex virus (HSV) infection     Previous  delivery affecting pregnancy 2018       Past Surgical History:   Procedure Laterality Date    BREAST SURGERY      AUGMENTATION     SECTION N/A 2018    Procedure:  SECTION;  Surgeon: Robert Shields MD;  [facility];  Service: OB/GYN;  Laterality: N/A;     SECTION  2017, 18    BREECH, female    TONSILLECTOMY         OB History    Para Term  AB Living   2 2 2 0 0 2   SAB TAB Ectopic Multiple Live Births   0 0 0 0 2      # Outcome Date GA Lbr Rocco/2nd Weight Sex Delivery Anes PTL Lv   2 Term 18 39w0d  3.827 kg (8 lb 7 oz) M CS-LTranv Spinal N DEBRA   1 Term  "04/11/17 39w0d  3.56 kg (7 lb 13.6 oz) F CS-LTranv Spinal N DEBRA      Complications: Breech presentation       Family History   Problem Relation Age of Onset    No Known Problems Father     Breast cancer Mother 45    Hyperlipidemia Mother     No Known Problems Daughter     No Known Problems Son     Colon cancer Neg Hx     Ovarian cancer Neg Hx        Social History     Tobacco Use    Smoking status: Never Smoker    Smokeless tobacco: Never Used   Substance Use Topics    Alcohol use: No     Alcohol/week: 0.0 standard drinks    Drug use: No         ROS:  GENERAL: Denies weight gain or weight loss. Feeling well overall.   SKIN: Denies rash or lesions.   HEENT: Denies headaches, or vision changes.   CARDIOVASCULAR: Denies palpitations or left sided chest pain.   RESPIRATORY: Denies shortness of breath or dyspnea on exertion.  BREASTS: Denies pain, lumps, or nipple discharge.   ABDOMEN: Denies abdominal pain, constipation, diarrhea, nausea, vomiting, change in appetite or rectal bleeding.   URINARY: Denies frequency, dysuria, hematuria.  NEUROLOGIC: Denies syncope or weakness.   PSYCHIATRIC: Denies depression, anxiety or mood swings.    Physical Exam:  /70   Ht 5' 7" (1.702 m)   Wt 100 kg (220 lb 7.4 oz)   LMP 01/27/2020   Breastfeeding? No   BMI 34.53 kg/m²     APPEARANCE: Well nourished, well developed, in no acute distress.  AFFECT: WNL, alert and oriented x 3  SKIN: No acne or hirsutism  BREASTS: Symmetrical, no skin changes.                     No nipple discharge. No palpable masses bilaterally  NODES: No inguinal nor axillary LAD  ABDOMEN: soft Non tender Non distended No masses  PELVIC:   Normal external genitalia without lesions.  Normal hair distribution.   Adequate perineal body, normal urethral meatus. No signif cystocele or rectocele.  Vagina moist and well rugated without lesions or discharge.    Cervix pink, without lesions, discharge or tenderness.     PAP not performed   Bimanual " exam shows uterus to be normal size, regular, mobile and nontender.  Adnexa without masses or tenderness.    EXTREMITIES: No edema.        ASSESSMENT AND PLAN    Leydi was seen today for well woman.    Diagnoses and all orders for this visit:    Encounter for gynecological examination    Weight gain  -     Ambulatory referral/consult to Internal Medicine; Future  -     T4, free; Future  -     TSH; Future  -     CBC auto differential; Future  -     Hemoglobin A1c; Future  -     Vitamin D; Future          Follow up in about 1 year (around 2/20/2021).    Patient was counseled today on A.C.S. Pap guidelines and recommendations for yearly pelvic exams, mammograms and monthly self breast exams; to see her PCP for other health maintenance.     Patient encouraged to register for portal and results will be sent via portal.       Health Maintenance   Topic Date Due    Lipid Panel  1988    Pap Smear with HPV Cotest  04/26/2021    TETANUS VACCINE  03/07/2027

## 2020-02-24 NOTE — PROGRESS NOTES
I have received your lab work and all of your lab work is essentially normal but your vitamin D is low.    Ideally vitamin-D level should be above 40.  I recommend vitamin-D 2000 IUs daily.  You can get this at your local pharmacy/supermarket.    After taking vitamin-D supplements for 6 months, we can recheck this level.  Most people with low vitamin-D have no symptoms, however vitamin-D has been associated with a host of condition ssuch as osteoporosis, heart disease, certain cancers, autoimmune diseases and neurological diseases such as multiple sclerosis, depression and fatigue.  For these reasons it is important to take a Vit D supplement daily.  Let me know if you have any questions or concerns,  Dr Shields

## 2020-07-13 ENCOUNTER — TELEPHONE (OUTPATIENT)
Dept: OBSTETRICS AND GYNECOLOGY | Facility: CLINIC | Age: 30
End: 2020-07-13

## 2020-07-13 RX ORDER — VALACYCLOVIR HYDROCHLORIDE 500 MG/1
500 TABLET, FILM COATED ORAL 2 TIMES DAILY
Qty: 30 TABLET | Refills: 3 | Status: SHIPPED | OUTPATIENT
Start: 2020-07-13 | End: 2021-04-30

## 2021-01-27 ENCOUNTER — TELEPHONE (OUTPATIENT)
Dept: OBSTETRICS AND GYNECOLOGY | Facility: CLINIC | Age: 31
End: 2021-01-27

## 2021-01-28 ENCOUNTER — OFFICE VISIT (OUTPATIENT)
Dept: OBSTETRICS AND GYNECOLOGY | Facility: CLINIC | Age: 31
End: 2021-01-28
Payer: COMMERCIAL

## 2021-01-28 VITALS
BODY MASS INDEX: 33.21 KG/M2 | HEIGHT: 67 IN | WEIGHT: 211.63 LBS | DIASTOLIC BLOOD PRESSURE: 70 MMHG | SYSTOLIC BLOOD PRESSURE: 114 MMHG

## 2021-01-28 DIAGNOSIS — N30.01 ACUTE CYSTITIS WITH HEMATURIA: Primary | ICD-10-CM

## 2021-01-28 DIAGNOSIS — R30.0 DYSURIA: ICD-10-CM

## 2021-01-28 LAB
BILIRUB SERPL-MCNC: NORMAL MG/DL
BLOOD URINE, POC: NORMAL
CLARITY, POC UA: NORMAL
COLOR, POC UA: YELLOW
GLUCOSE UR QL STRIP: NORMAL
KETONES UR QL STRIP: NORMAL
LEUKOCYTE ESTERASE URINE, POC: NORMAL
NITRITE, POC UA: NORMAL
PH, POC UA: 7
PROTEIN, POC: NORMAL
SPECIFIC GRAVITY, POC UA: NORMAL
UROBILINOGEN, POC UA: NORMAL

## 2021-01-28 PROCEDURE — 87077 CULTURE AEROBIC IDENTIFY: CPT

## 2021-01-28 PROCEDURE — 1126F PR PAIN SEVERITY QUANTIFIED, NO PAIN PRESENT: ICD-10-PCS | Mod: S$GLB,,, | Performed by: NURSE PRACTITIONER

## 2021-01-28 PROCEDURE — 87086 URINE CULTURE/COLONY COUNT: CPT

## 2021-01-28 PROCEDURE — 99999 PR PBB SHADOW E&M-EST. PATIENT-LVL II: ICD-10-PCS | Mod: PBBFAC,,, | Performed by: NURSE PRACTITIONER

## 2021-01-28 PROCEDURE — 87186 SC STD MICRODIL/AGAR DIL: CPT

## 2021-01-28 PROCEDURE — 99999 PR PBB SHADOW E&M-EST. PATIENT-LVL II: CPT | Mod: PBBFAC,,, | Performed by: NURSE PRACTITIONER

## 2021-01-28 PROCEDURE — 3008F PR BODY MASS INDEX (BMI) DOCUMENTED: ICD-10-PCS | Mod: CPTII,S$GLB,, | Performed by: NURSE PRACTITIONER

## 2021-01-28 PROCEDURE — 3008F BODY MASS INDEX DOCD: CPT | Mod: CPTII,S$GLB,, | Performed by: NURSE PRACTITIONER

## 2021-01-28 PROCEDURE — 1126F AMNT PAIN NOTED NONE PRSNT: CPT | Mod: S$GLB,,, | Performed by: NURSE PRACTITIONER

## 2021-01-28 PROCEDURE — 81002 URINALYSIS NONAUTO W/O SCOPE: CPT | Mod: S$GLB,,, | Performed by: NURSE PRACTITIONER

## 2021-01-28 PROCEDURE — 99213 PR OFFICE/OUTPT VISIT, EST, LEVL III, 20-29 MIN: ICD-10-PCS | Mod: 25,S$GLB,, | Performed by: NURSE PRACTITIONER

## 2021-01-28 PROCEDURE — 87088 URINE BACTERIA CULTURE: CPT

## 2021-01-28 PROCEDURE — 99213 OFFICE O/P EST LOW 20 MIN: CPT | Mod: 25,S$GLB,, | Performed by: NURSE PRACTITIONER

## 2021-01-28 PROCEDURE — 81002 POCT URINE DIPSTICK WITHOUT MICROSCOPE: ICD-10-PCS | Mod: S$GLB,,, | Performed by: NURSE PRACTITIONER

## 2021-01-28 RX ORDER — NITROFURANTOIN 25; 75 MG/1; MG/1
100 CAPSULE ORAL 2 TIMES DAILY
Qty: 10 CAPSULE | Refills: 0 | Status: SHIPPED | OUTPATIENT
Start: 2021-01-28 | End: 2021-02-02

## 2021-01-28 RX ORDER — PHENAZOPYRIDINE HYDROCHLORIDE 200 MG/1
200 TABLET, FILM COATED ORAL 3 TIMES DAILY PRN
Qty: 20 TABLET | Refills: 0 | Status: SHIPPED | OUTPATIENT
Start: 2021-01-28 | End: 2021-04-30

## 2021-02-01 LAB — BACTERIA UR CULT: ABNORMAL

## 2021-02-25 ENCOUNTER — TELEPHONE (OUTPATIENT)
Dept: OBSTETRICS AND GYNECOLOGY | Facility: CLINIC | Age: 31
End: 2021-02-25

## 2021-04-15 ENCOUNTER — PATIENT MESSAGE (OUTPATIENT)
Dept: RESEARCH | Facility: HOSPITAL | Age: 31
End: 2021-04-15

## 2021-04-22 ENCOUNTER — TELEPHONE (OUTPATIENT)
Dept: OBSTETRICS AND GYNECOLOGY | Facility: CLINIC | Age: 31
End: 2021-04-22

## 2021-04-22 DIAGNOSIS — Z32.01 POSITIVE URINE PREGNANCY TEST: Primary | ICD-10-CM

## 2021-04-24 ENCOUNTER — PATIENT MESSAGE (OUTPATIENT)
Dept: OBSTETRICS AND GYNECOLOGY | Facility: CLINIC | Age: 31
End: 2021-04-24

## 2021-04-24 ENCOUNTER — LAB VISIT (OUTPATIENT)
Dept: LAB | Facility: HOSPITAL | Age: 31
End: 2021-04-24
Attending: OBSTETRICS & GYNECOLOGY

## 2021-04-24 DIAGNOSIS — Z32.01 POSITIVE URINE PREGNANCY TEST: ICD-10-CM

## 2021-04-24 DIAGNOSIS — Z32.01 POSITIVE URINE PREGNANCY TEST: Primary | ICD-10-CM

## 2021-04-24 LAB
HCG INTACT+B SERPL-ACNC: 182 MIU/ML
PROGEST SERPL-MCNC: 0.3 NG/ML

## 2021-04-24 PROCEDURE — 84144 ASSAY OF PROGESTERONE: CPT | Performed by: OBSTETRICS & GYNECOLOGY

## 2021-04-24 PROCEDURE — 36415 COLL VENOUS BLD VENIPUNCTURE: CPT | Mod: PO | Performed by: OBSTETRICS & GYNECOLOGY

## 2021-04-24 PROCEDURE — 84702 CHORIONIC GONADOTROPIN TEST: CPT | Performed by: OBSTETRICS & GYNECOLOGY

## 2021-04-26 ENCOUNTER — LAB VISIT (OUTPATIENT)
Dept: LAB | Facility: HOSPITAL | Age: 31
End: 2021-04-26
Attending: OBSTETRICS & GYNECOLOGY
Payer: COMMERCIAL

## 2021-04-26 DIAGNOSIS — Z32.01 POSITIVE URINE PREGNANCY TEST: ICD-10-CM

## 2021-04-26 PROCEDURE — 36415 COLL VENOUS BLD VENIPUNCTURE: CPT | Mod: PO | Performed by: OBSTETRICS & GYNECOLOGY

## 2021-04-26 PROCEDURE — 84702 CHORIONIC GONADOTROPIN TEST: CPT | Performed by: OBSTETRICS & GYNECOLOGY

## 2021-04-27 LAB — HCG INTACT+B SERPL-ACNC: 156 MIU/ML

## 2021-04-28 ENCOUNTER — PATIENT MESSAGE (OUTPATIENT)
Dept: OBSTETRICS AND GYNECOLOGY | Facility: CLINIC | Age: 31
End: 2021-04-28

## 2021-04-28 DIAGNOSIS — O20.0 THREATENED ABORTION: Primary | ICD-10-CM

## 2021-04-29 ENCOUNTER — LAB VISIT (OUTPATIENT)
Dept: LAB | Facility: HOSPITAL | Age: 31
End: 2021-04-29
Attending: OBSTETRICS & GYNECOLOGY
Payer: COMMERCIAL

## 2021-04-29 DIAGNOSIS — O20.0 THREATENED ABORTION: ICD-10-CM

## 2021-04-29 LAB — HCG INTACT+B SERPL-ACNC: 162 MIU/ML

## 2021-04-29 PROCEDURE — 84702 CHORIONIC GONADOTROPIN TEST: CPT | Performed by: OBSTETRICS & GYNECOLOGY

## 2021-04-30 ENCOUNTER — PROCEDURE VISIT (OUTPATIENT)
Dept: OBSTETRICS AND GYNECOLOGY | Facility: CLINIC | Age: 31
End: 2021-04-30
Attending: OBSTETRICS & GYNECOLOGY

## 2021-04-30 ENCOUNTER — OFFICE VISIT (OUTPATIENT)
Dept: OBSTETRICS AND GYNECOLOGY | Facility: CLINIC | Age: 31
End: 2021-04-30
Attending: OBSTETRICS & GYNECOLOGY
Payer: COMMERCIAL

## 2021-04-30 VITALS
BODY MASS INDEX: 33.91 KG/M2 | SYSTOLIC BLOOD PRESSURE: 130 MMHG | DIASTOLIC BLOOD PRESSURE: 84 MMHG | HEIGHT: 67 IN | WEIGHT: 216.06 LBS

## 2021-04-30 DIAGNOSIS — O03.9 SAB (SPONTANEOUS ABORTION): Primary | ICD-10-CM

## 2021-04-30 DIAGNOSIS — Z32.01 POSITIVE PREGNANCY TEST: ICD-10-CM

## 2021-04-30 DIAGNOSIS — Z32.01 POSITIVE PREGNANCY TEST: Primary | ICD-10-CM

## 2021-04-30 PROCEDURE — 76856 PR  ECHO,PELVIC (NONOBSTETRIC): ICD-10-PCS | Mod: S$GLB,,, | Performed by: OBSTETRICS & GYNECOLOGY

## 2021-04-30 PROCEDURE — 99214 OFFICE O/P EST MOD 30 MIN: CPT | Mod: S$PBB,,, | Performed by: OBSTETRICS & GYNECOLOGY

## 2021-04-30 PROCEDURE — 99999 PR PBB SHADOW E&M-EST. PATIENT-LVL III: ICD-10-PCS | Mod: PBBFAC,,, | Performed by: OBSTETRICS & GYNECOLOGY

## 2021-04-30 PROCEDURE — 99999 PR PBB SHADOW E&M-EST. PATIENT-LVL III: CPT | Mod: PBBFAC,,, | Performed by: OBSTETRICS & GYNECOLOGY

## 2021-04-30 PROCEDURE — 99214 PR OFFICE/OUTPT VISIT, EST, LEVL IV, 30-39 MIN: ICD-10-PCS | Mod: S$PBB,,, | Performed by: OBSTETRICS & GYNECOLOGY

## 2021-04-30 PROCEDURE — 76856 US EXAM PELVIC COMPLETE: CPT | Mod: S$GLB,,, | Performed by: OBSTETRICS & GYNECOLOGY

## 2021-04-30 RX ORDER — MISOPROSTOL 200 UG/1
200 TABLET ORAL EVERY 6 HOURS
Qty: 6 TABLET | Refills: 0 | Status: SHIPPED | OUTPATIENT
Start: 2021-04-30 | End: 2022-04-26

## 2021-05-03 ENCOUNTER — LAB VISIT (OUTPATIENT)
Dept: LAB | Facility: HOSPITAL | Age: 31
End: 2021-05-03
Attending: OBSTETRICS & GYNECOLOGY
Payer: COMMERCIAL

## 2021-05-03 DIAGNOSIS — O03.9 SAB (SPONTANEOUS ABORTION): ICD-10-CM

## 2021-05-03 PROCEDURE — 84702 CHORIONIC GONADOTROPIN TEST: CPT | Performed by: OBSTETRICS & GYNECOLOGY

## 2021-05-04 LAB — HCG INTACT+B SERPL-ACNC: 87 MIU/ML

## 2021-09-23 ENCOUNTER — IMMUNIZATION (OUTPATIENT)
Dept: PRIMARY CARE CLINIC | Facility: CLINIC | Age: 31
End: 2021-09-23
Payer: COMMERCIAL

## 2021-09-23 DIAGNOSIS — Z23 NEED FOR VACCINATION: Primary | ICD-10-CM

## 2021-09-23 PROCEDURE — 0012A COVID-19, MRNA, LNP-S, PF, 100 MCG/0.5 ML DOSE VACCINE: CPT | Mod: CV19,PBBFAC | Performed by: INTERNAL MEDICINE

## 2021-11-02 ENCOUNTER — LAB VISIT (OUTPATIENT)
Dept: LAB | Facility: HOSPITAL | Age: 31
End: 2021-11-02
Attending: OBSTETRICS & GYNECOLOGY
Payer: COMMERCIAL

## 2021-11-02 DIAGNOSIS — R39.89 SUSPECTED UTI: ICD-10-CM

## 2021-11-02 DIAGNOSIS — R39.89 SUSPECTED UTI: Primary | ICD-10-CM

## 2021-11-02 PROCEDURE — 87186 SC STD MICRODIL/AGAR DIL: CPT | Performed by: OBSTETRICS & GYNECOLOGY

## 2021-11-02 PROCEDURE — 87077 CULTURE AEROBIC IDENTIFY: CPT | Performed by: OBSTETRICS & GYNECOLOGY

## 2021-11-02 PROCEDURE — 87088 URINE BACTERIA CULTURE: CPT | Performed by: OBSTETRICS & GYNECOLOGY

## 2021-11-02 PROCEDURE — 87086 URINE CULTURE/COLONY COUNT: CPT | Performed by: OBSTETRICS & GYNECOLOGY

## 2021-11-02 RX ORDER — NITROFURANTOIN 25; 75 MG/1; MG/1
100 CAPSULE ORAL 2 TIMES DAILY
Qty: 14 CAPSULE | Refills: 0 | Status: SHIPPED | OUTPATIENT
Start: 2021-11-02 | End: 2021-11-09

## 2021-11-04 LAB — BACTERIA UR CULT: ABNORMAL

## 2021-12-03 ENCOUNTER — OFFICE VISIT (OUTPATIENT)
Dept: URGENT CARE | Facility: CLINIC | Age: 31
End: 2021-12-03
Payer: COMMERCIAL

## 2021-12-03 VITALS
HEIGHT: 67 IN | BODY MASS INDEX: 33.12 KG/M2 | DIASTOLIC BLOOD PRESSURE: 78 MMHG | TEMPERATURE: 99 F | HEART RATE: 110 BPM | SYSTOLIC BLOOD PRESSURE: 110 MMHG | RESPIRATION RATE: 20 BRPM | WEIGHT: 211 LBS | OXYGEN SATURATION: 98 %

## 2021-12-03 DIAGNOSIS — B34.9 VIRAL SYNDROME: Primary | ICD-10-CM

## 2021-12-03 LAB
BILIRUB UR QL STRIP: NEGATIVE
CTP QC/QA: YES
GLUCOSE UR QL STRIP: NEGATIVE
KETONES UR QL STRIP: NEGATIVE
LEUKOCYTE ESTERASE UR QL STRIP: NEGATIVE
PH, POC UA: 6 (ref 5–8)
POC BLOOD, URINE: NEGATIVE
POC MOLECULAR INFLUENZA A AGN: NEGATIVE
POC MOLECULAR INFLUENZA B AGN: NEGATIVE
POC NITRATES, URINE: NEGATIVE
PROT UR QL STRIP: POSITIVE
SP GR UR STRIP: 1.02 (ref 1–1.03)
UROBILINOGEN UR STRIP-ACNC: NORMAL (ref 0.1–1.1)

## 2021-12-03 PROCEDURE — 87502 INFLUENZA DNA AMP PROBE: CPT | Mod: QW,S$GLB,, | Performed by: PHYSICIAN ASSISTANT

## 2021-12-03 PROCEDURE — 81003 URINALYSIS AUTO W/O SCOPE: CPT | Mod: QW,S$GLB,, | Performed by: PHYSICIAN ASSISTANT

## 2021-12-03 PROCEDURE — 87502 POCT INFLUENZA A/B MOLECULAR: ICD-10-PCS | Mod: QW,S$GLB,, | Performed by: PHYSICIAN ASSISTANT

## 2021-12-03 PROCEDURE — 99213 PR OFFICE/OUTPT VISIT, EST, LEVL III, 20-29 MIN: ICD-10-PCS | Mod: 25,S$GLB,, | Performed by: PHYSICIAN ASSISTANT

## 2021-12-03 PROCEDURE — 99213 OFFICE O/P EST LOW 20 MIN: CPT | Mod: 25,S$GLB,, | Performed by: PHYSICIAN ASSISTANT

## 2021-12-03 PROCEDURE — 87086 URINE CULTURE/COLONY COUNT: CPT | Performed by: PHYSICIAN ASSISTANT

## 2021-12-03 PROCEDURE — 81003 POCT URINALYSIS, DIPSTICK, AUTOMATED, W/O SCOPE: ICD-10-PCS | Mod: QW,S$GLB,, | Performed by: PHYSICIAN ASSISTANT

## 2021-12-05 LAB — BACTERIA UR CULT: NORMAL

## 2022-04-26 ENCOUNTER — TELEPHONE (OUTPATIENT)
Dept: OBSTETRICS AND GYNECOLOGY | Facility: CLINIC | Age: 32
End: 2022-04-26
Payer: COMMERCIAL

## 2022-04-26 NOTE — TELEPHONE ENCOUNTER
Emilia Jones B Staff 55 minutes ago (3:32 PM)     JR Dr Shields pt calling, got a positive pregnant  test unsure of lmp doesn't want to see Dianna she knows her personally. Please help with scheduling . Pt # [phone]    Routing comment      Leydi Matias [phone]  Emilia Jones 57 minutes ago (3:30 PM)

## 2022-04-26 NOTE — TELEPHONE ENCOUNTER
Spoke with pt, unsure of lmp. Positive upt yesterday  , miscarriage last yr.  Does not want to see Dianna for confirmation since she knows her.  Scheduled 22 at 1pm with  in [place] for confirmation visit/ possible labs

## 2022-04-28 ENCOUNTER — LAB VISIT (OUTPATIENT)
Dept: LAB | Facility: HOSPITAL | Age: 32
End: 2022-04-28
Attending: OBSTETRICS & GYNECOLOGY
Payer: COMMERCIAL

## 2022-04-28 ENCOUNTER — OFFICE VISIT (OUTPATIENT)
Dept: OBSTETRICS AND GYNECOLOGY | Facility: CLINIC | Age: 32
End: 2022-04-28
Attending: OBSTETRICS & GYNECOLOGY
Payer: COMMERCIAL

## 2022-04-28 VITALS
DIASTOLIC BLOOD PRESSURE: 76 MMHG | WEIGHT: 216.06 LBS | SYSTOLIC BLOOD PRESSURE: 122 MMHG | BODY MASS INDEX: 33.91 KG/M2 | HEIGHT: 67 IN

## 2022-04-28 DIAGNOSIS — Z32.01 POSITIVE URINE PREGNANCY TEST: ICD-10-CM

## 2022-04-28 DIAGNOSIS — N92.6 MISSED MENSES: Primary | ICD-10-CM

## 2022-04-28 DIAGNOSIS — N92.6 MISSED MENSES: ICD-10-CM

## 2022-04-28 LAB
B-HCG UR QL: POSITIVE
CTP QC/QA: YES

## 2022-04-28 PROCEDURE — 99213 OFFICE O/P EST LOW 20 MIN: CPT | Mod: S$GLB,,, | Performed by: OBSTETRICS & GYNECOLOGY

## 2022-04-28 PROCEDURE — 1159F PR MEDICATION LIST DOCUMENTED IN MEDICAL RECORD: ICD-10-PCS | Mod: CPTII,S$GLB,, | Performed by: OBSTETRICS & GYNECOLOGY

## 2022-04-28 PROCEDURE — 3008F BODY MASS INDEX DOCD: CPT | Mod: CPTII,S$GLB,, | Performed by: OBSTETRICS & GYNECOLOGY

## 2022-04-28 PROCEDURE — 81025 POCT URINE PREGNANCY: ICD-10-PCS | Mod: S$GLB,,, | Performed by: OBSTETRICS & GYNECOLOGY

## 2022-04-28 PROCEDURE — 84702 CHORIONIC GONADOTROPIN TEST: CPT | Performed by: OBSTETRICS & GYNECOLOGY

## 2022-04-28 PROCEDURE — 3078F PR MOST RECENT DIASTOLIC BLOOD PRESSURE < 80 MM HG: ICD-10-PCS | Mod: CPTII,S$GLB,, | Performed by: OBSTETRICS & GYNECOLOGY

## 2022-04-28 PROCEDURE — 81025 URINE PREGNANCY TEST: CPT | Mod: S$GLB,,, | Performed by: OBSTETRICS & GYNECOLOGY

## 2022-04-28 PROCEDURE — 3008F PR BODY MASS INDEX (BMI) DOCUMENTED: ICD-10-PCS | Mod: CPTII,S$GLB,, | Performed by: OBSTETRICS & GYNECOLOGY

## 2022-04-28 PROCEDURE — 3074F PR MOST RECENT SYSTOLIC BLOOD PRESSURE < 130 MM HG: ICD-10-PCS | Mod: CPTII,S$GLB,, | Performed by: OBSTETRICS & GYNECOLOGY

## 2022-04-28 PROCEDURE — 84144 ASSAY OF PROGESTERONE: CPT | Performed by: OBSTETRICS & GYNECOLOGY

## 2022-04-28 PROCEDURE — 1159F MED LIST DOCD IN RCRD: CPT | Mod: CPTII,S$GLB,, | Performed by: OBSTETRICS & GYNECOLOGY

## 2022-04-28 PROCEDURE — 99213 PR OFFICE/OUTPT VISIT, EST, LEVL III, 20-29 MIN: ICD-10-PCS | Mod: S$GLB,,, | Performed by: OBSTETRICS & GYNECOLOGY

## 2022-04-28 PROCEDURE — 99999 PR PBB SHADOW E&M-EST. PATIENT-LVL III: ICD-10-PCS | Mod: PBBFAC,,, | Performed by: OBSTETRICS & GYNECOLOGY

## 2022-04-28 PROCEDURE — 3078F DIAST BP <80 MM HG: CPT | Mod: CPTII,S$GLB,, | Performed by: OBSTETRICS & GYNECOLOGY

## 2022-04-28 PROCEDURE — 99999 PR PBB SHADOW E&M-EST. PATIENT-LVL III: CPT | Mod: PBBFAC,,, | Performed by: OBSTETRICS & GYNECOLOGY

## 2022-04-28 PROCEDURE — 3074F SYST BP LT 130 MM HG: CPT | Mod: CPTII,S$GLB,, | Performed by: OBSTETRICS & GYNECOLOGY

## 2022-04-28 NOTE — PROGRESS NOTES
"CC: Absence of menses      No LMP recorded. Patient is pregnant.,    UPT pos today      Leydi Matias is a 32 y.o. female  presents with complaint of absence of menstruation.  Her first positive home UPT was  2 days ago    States her menstrual cycle was early March and ?? None since     She reports nausea,   No bleeding, or abdominal pain.  Last preg had biochem preg  Prev c/s 2 for repeat    UPT is: positive.       Past Medical History:   Diagnosis Date    Acid reflux     Family history of breast cancer in mother     Herpes simplex virus (HSV) infection     Previous  delivery affecting pregnancy 2018     Past Surgical History:   Procedure Laterality Date    BREAST SURGERY      AUGMENTATION     SECTION N/A 2018    Procedure:  SECTION;  Surgeon: Robert Shields MD;  Location: [facility];  Service: OB/GYN;  Laterality: N/A;     SECTION  2017, 18    BREECH, female    TONSILLECTOMY       Social History     Tobacco Use    Smoking status: Never Smoker    Smokeless tobacco: Never Used   Substance Use Topics    Alcohol use: No     Alcohol/week: 0.0 standard drinks    Drug use: No     Family History   Problem Relation Age of Onset    No Known Problems Father     Breast cancer Mother 45    Hyperlipidemia Mother     No Known Problems Daughter     No Known Problems Son     Colon cancer Neg Hx     Ovarian cancer Neg Hx      OB History    Para Term  AB Living   4 2 2 0 1 2   SAB IAB Ectopic Multiple Live Births   1 0 0 0 2      # Outcome Date GA Lbr Rocco/2nd Weight Sex Delivery Anes PTL Lv   4 Current            3 2021              Complications: Biochemical pregnancy   2 Term 18 39w0d  3.827 kg (8 lb 7 oz) M CS-LTranv Spinal N DEBRA   1 Term 17 39w0d  3.56 kg (7 lb 13.6 oz) F CS-LTranv Spinal N DEBRA      Complications: Breech presentation       /76   Ht 5' 7" (1.702 m)   Wt 98 kg (216 lb 0.8 oz)   BMI 33.84 kg/m² "       PE:   APPEARANCE: Well nourished, well developed, in no acute distress.  AFFECT: WNL, alert and oriented x 3.    COUNSELING:    New OB packet given to pt:  Info in packet include:    gave prenatal handout and answered questions.    - continue PNV daily.  - gave spotting/cramping precautions.    *  Connected Moms-- register at  Initial OB visit     ASSESSMENT and PLAN:  Missed menses  -     POCT urine pregnancy  -     HCG, Quantitative; Future; Expected date: 04/28/2022  -     Progesterone; Future; Expected date: 04/28/2022  -     US OB/GYN Procedure ([facility]) - Extended List; Future    Positive urine pregnancy test  -     US OB/GYN Procedure ([facility]) - Extended List; Future        ~15 minutes spent with pt Face to Face with >50% of visit spent on education/counseling.

## 2022-04-29 ENCOUNTER — PATIENT MESSAGE (OUTPATIENT)
Dept: OBSTETRICS AND GYNECOLOGY | Facility: CLINIC | Age: 32
End: 2022-04-29
Payer: COMMERCIAL

## 2022-04-29 DIAGNOSIS — Z34.90 EARLY STAGE OF PREGNANCY: Primary | ICD-10-CM

## 2022-04-29 LAB
HCG INTACT+B SERPL-ACNC: 2819 MIU/ML
PROGEST SERPL-MCNC: 9.5 NG/ML

## 2022-04-29 RX ORDER — PROGESTERONE 200 MG/1
200 CAPSULE ORAL 2 TIMES DAILY
Qty: 60 CAPSULE | Refills: 2 | Status: SHIPPED | OUTPATIENT
Start: 2022-04-29 | End: 2022-07-14

## 2022-05-05 ENCOUNTER — PATIENT MESSAGE (OUTPATIENT)
Dept: OBSTETRICS AND GYNECOLOGY | Facility: CLINIC | Age: 32
End: 2022-05-05
Payer: COMMERCIAL

## 2022-05-05 ENCOUNTER — LAB VISIT (OUTPATIENT)
Dept: LAB | Facility: HOSPITAL | Age: 32
End: 2022-05-05
Attending: OBSTETRICS & GYNECOLOGY
Payer: COMMERCIAL

## 2022-05-05 DIAGNOSIS — Z34.90 EARLY STAGE OF PREGNANCY: ICD-10-CM

## 2022-05-05 LAB — HCG INTACT+B SERPL-ACNC: NORMAL MIU/ML

## 2022-05-05 PROCEDURE — 84702 CHORIONIC GONADOTROPIN TEST: CPT | Performed by: OBSTETRICS & GYNECOLOGY

## 2022-05-13 ENCOUNTER — PROCEDURE VISIT (OUTPATIENT)
Dept: OBSTETRICS AND GYNECOLOGY | Facility: CLINIC | Age: 32
End: 2022-05-13
Payer: COMMERCIAL

## 2022-05-13 ENCOUNTER — LAB VISIT (OUTPATIENT)
Dept: LAB | Facility: OTHER | Age: 32
End: 2022-05-13
Attending: OBSTETRICS & GYNECOLOGY
Payer: COMMERCIAL

## 2022-05-13 ENCOUNTER — OFFICE VISIT (OUTPATIENT)
Dept: OBSTETRICS AND GYNECOLOGY | Facility: CLINIC | Age: 32
End: 2022-05-13
Attending: OBSTETRICS & GYNECOLOGY
Payer: COMMERCIAL

## 2022-05-13 VITALS
HEIGHT: 67 IN | SYSTOLIC BLOOD PRESSURE: 118 MMHG | BODY MASS INDEX: 34.52 KG/M2 | DIASTOLIC BLOOD PRESSURE: 78 MMHG | WEIGHT: 219.94 LBS

## 2022-05-13 DIAGNOSIS — N92.6 MISSED MENSES: ICD-10-CM

## 2022-05-13 DIAGNOSIS — Z34.90 PREGNANCY, UNSPECIFIED GESTATIONAL AGE: ICD-10-CM

## 2022-05-13 DIAGNOSIS — Z32.01 POSITIVE URINE PREGNANCY TEST: ICD-10-CM

## 2022-05-13 DIAGNOSIS — N92.6 MISSED PERIOD: Primary | ICD-10-CM

## 2022-05-13 LAB
ABO + RH BLD: NORMAL
B-HCG UR QL: POSITIVE
BASOPHILS # BLD AUTO: 0.03 K/UL (ref 0–0.2)
BASOPHILS NFR BLD: 0.6 % (ref 0–1.9)
BLD GP AB SCN CELLS X3 SERPL QL: NORMAL
CTP QC/QA: YES
DIFFERENTIAL METHOD: ABNORMAL
EOSINOPHIL # BLD AUTO: 0.2 K/UL (ref 0–0.5)
EOSINOPHIL NFR BLD: 3.8 % (ref 0–8)
ERYTHROCYTE [DISTWIDTH] IN BLOOD BY AUTOMATED COUNT: 16.1 % (ref 11.5–14.5)
HCT VFR BLD AUTO: 37.5 % (ref 37–48.5)
HGB BLD-MCNC: 11.7 G/DL (ref 12–16)
IMM GRANULOCYTES # BLD AUTO: 0.02 K/UL (ref 0–0.04)
IMM GRANULOCYTES NFR BLD AUTO: 0.4 % (ref 0–0.5)
LYMPHOCYTES # BLD AUTO: 1.5 K/UL (ref 1–4.8)
LYMPHOCYTES NFR BLD: 28.5 % (ref 18–48)
MCH RBC QN AUTO: 25.2 PG (ref 27–31)
MCHC RBC AUTO-ENTMCNC: 31.2 G/DL (ref 32–36)
MCV RBC AUTO: 81 FL (ref 82–98)
MONOCYTES # BLD AUTO: 0.2 K/UL (ref 0.3–1)
MONOCYTES NFR BLD: 4.4 % (ref 4–15)
NEUTROPHILS # BLD AUTO: 3.2 K/UL (ref 1.8–7.7)
NEUTROPHILS NFR BLD: 62.3 % (ref 38–73)
NRBC BLD-RTO: 0 /100 WBC
PLATELET # BLD AUTO: 175 K/UL (ref 150–450)
PMV BLD AUTO: 11.1 FL (ref 9.2–12.9)
RBC # BLD AUTO: 4.65 M/UL (ref 4–5.4)
TSH SERPL DL<=0.005 MIU/L-ACNC: 1.49 UIU/ML (ref 0.4–4)
WBC # BLD AUTO: 5.2 K/UL (ref 3.9–12.7)

## 2022-05-13 PROCEDURE — 3008F PR BODY MASS INDEX (BMI) DOCUMENTED: ICD-10-PCS | Mod: CPTII,S$GLB,, | Performed by: OBSTETRICS & GYNECOLOGY

## 2022-05-13 PROCEDURE — 81025 POCT URINE PREGNANCY: ICD-10-PCS | Mod: S$GLB,,, | Performed by: OBSTETRICS & GYNECOLOGY

## 2022-05-13 PROCEDURE — 87086 URINE CULTURE/COLONY COUNT: CPT | Performed by: OBSTETRICS & GYNECOLOGY

## 2022-05-13 PROCEDURE — 76817 TRANSVAGINAL US OBSTETRIC: CPT | Mod: S$GLB,,, | Performed by: OBSTETRICS & GYNECOLOGY

## 2022-05-13 PROCEDURE — 99999 PR PBB SHADOW E&M-EST. PATIENT-LVL III: ICD-10-PCS | Mod: PBBFAC,,, | Performed by: OBSTETRICS & GYNECOLOGY

## 2022-05-13 PROCEDURE — 84443 ASSAY THYROID STIM HORMONE: CPT | Performed by: OBSTETRICS & GYNECOLOGY

## 2022-05-13 PROCEDURE — 3008F BODY MASS INDEX DOCD: CPT | Mod: CPTII,S$GLB,, | Performed by: OBSTETRICS & GYNECOLOGY

## 2022-05-13 PROCEDURE — 87491 CHLMYD TRACH DNA AMP PROBE: CPT | Performed by: OBSTETRICS & GYNECOLOGY

## 2022-05-13 PROCEDURE — 86592 SYPHILIS TEST NON-TREP QUAL: CPT | Performed by: OBSTETRICS & GYNECOLOGY

## 2022-05-13 PROCEDURE — 87389 HIV-1 AG W/HIV-1&-2 AB AG IA: CPT | Performed by: OBSTETRICS & GYNECOLOGY

## 2022-05-13 PROCEDURE — 76801 OB US < 14 WKS SINGLE FETUS: CPT | Mod: S$GLB,,, | Performed by: OBSTETRICS & GYNECOLOGY

## 2022-05-13 PROCEDURE — 87340 HEPATITIS B SURFACE AG IA: CPT | Performed by: OBSTETRICS & GYNECOLOGY

## 2022-05-13 PROCEDURE — 3078F DIAST BP <80 MM HG: CPT | Mod: CPTII,S$GLB,, | Performed by: OBSTETRICS & GYNECOLOGY

## 2022-05-13 PROCEDURE — 86762 RUBELLA ANTIBODY: CPT | Performed by: OBSTETRICS & GYNECOLOGY

## 2022-05-13 PROCEDURE — 99999 PR PBB SHADOW E&M-EST. PATIENT-LVL III: CPT | Mod: PBBFAC,,, | Performed by: OBSTETRICS & GYNECOLOGY

## 2022-05-13 PROCEDURE — 99214 OFFICE O/P EST MOD 30 MIN: CPT | Mod: 25,S$GLB,, | Performed by: OBSTETRICS & GYNECOLOGY

## 2022-05-13 PROCEDURE — 87591 N.GONORRHOEAE DNA AMP PROB: CPT | Performed by: OBSTETRICS & GYNECOLOGY

## 2022-05-13 PROCEDURE — 76801 US OB/GYN EXTENDED PROCEDURE (VIEWPOINT): ICD-10-PCS | Mod: S$GLB,,, | Performed by: OBSTETRICS & GYNECOLOGY

## 2022-05-13 PROCEDURE — 1159F MED LIST DOCD IN RCRD: CPT | Mod: CPTII,S$GLB,, | Performed by: OBSTETRICS & GYNECOLOGY

## 2022-05-13 PROCEDURE — 3074F PR MOST RECENT SYSTOLIC BLOOD PRESSURE < 130 MM HG: ICD-10-PCS | Mod: CPTII,S$GLB,, | Performed by: OBSTETRICS & GYNECOLOGY

## 2022-05-13 PROCEDURE — 1159F PR MEDICATION LIST DOCUMENTED IN MEDICAL RECORD: ICD-10-PCS | Mod: CPTII,S$GLB,, | Performed by: OBSTETRICS & GYNECOLOGY

## 2022-05-13 PROCEDURE — 99214 PR OFFICE/OUTPT VISIT, EST, LEVL IV, 30-39 MIN: ICD-10-PCS | Mod: 25,S$GLB,, | Performed by: OBSTETRICS & GYNECOLOGY

## 2022-05-13 PROCEDURE — 76817 US OB/GYN EXTENDED PROCEDURE (VIEWPOINT): ICD-10-PCS | Mod: S$GLB,,, | Performed by: OBSTETRICS & GYNECOLOGY

## 2022-05-13 PROCEDURE — 81025 URINE PREGNANCY TEST: CPT | Mod: S$GLB,,, | Performed by: OBSTETRICS & GYNECOLOGY

## 2022-05-13 PROCEDURE — 86803 HEPATITIS C AB TEST: CPT | Performed by: OBSTETRICS & GYNECOLOGY

## 2022-05-13 PROCEDURE — 85025 COMPLETE CBC W/AUTO DIFF WBC: CPT | Performed by: OBSTETRICS & GYNECOLOGY

## 2022-05-13 PROCEDURE — 3074F SYST BP LT 130 MM HG: CPT | Mod: CPTII,S$GLB,, | Performed by: OBSTETRICS & GYNECOLOGY

## 2022-05-13 PROCEDURE — 3078F PR MOST RECENT DIASTOLIC BLOOD PRESSURE < 80 MM HG: ICD-10-PCS | Mod: CPTII,S$GLB,, | Performed by: OBSTETRICS & GYNECOLOGY

## 2022-05-13 PROCEDURE — 86901 BLOOD TYPING SEROLOGIC RH(D): CPT | Performed by: OBSTETRICS & GYNECOLOGY

## 2022-05-13 NOTE — PROGRESS NOTES
CC: Absence of menses    (Dr. Shields patient)  No LMP recorded (lmp unknown). Patient is pregnant.,   EDC: 22,   GA:  7w1d      Leydi Matias is a 32 y.o. female  presents with complaint of absence of menstruation.  Unsure LMP  Prev c/s 2   She denies nausea/vomiting, bleeding, or abdominal pain.  Can take Allegra for allergies  On PNV and to Finish Prometrium and can stop at 10 weeks  H/o Biochem preg    U/s today looks great       UPT is: positive.     Last Pap:   Normal,  HPV negative    Past Medical History:   Diagnosis Date    Acid reflux     Family history of breast cancer in mother     Herpes simplex virus (HSV) infection     Previous  delivery affecting pregnancy 2018     Past Surgical History:   Procedure Laterality Date    BREAST SURGERY      AUGMENTATION     SECTION N/A 2018    Procedure:  SECTION;  Surgeon: Robert Shields MD;  [facility];  Service: OB/GYN;  Laterality: N/A;     SECTION  2017, 18    BREECH, female    TONSILLECTOMY       Social History     Tobacco Use    Smoking status: Never Smoker    Smokeless tobacco: Never Used   Substance Use Topics    Alcohol use: No     Alcohol/week: 0.0 standard drinks    Drug use: No     Family History   Problem Relation Age of Onset    No Known Problems Father     Breast cancer Mother 45    Hyperlipidemia Mother     No Known Problems Daughter     No Known Problems Son     Colon cancer Neg Hx     Ovarian cancer Neg Hx      OB History    Para Term  AB Living   4 2 2 0 1 2   SAB IAB Ectopic Multiple Live Births   1 0 0 0 2      # Outcome Date GA Lbr Rocco/2nd Weight Sex Delivery Anes PTL Lv   4 Current            3 2021              Complications: Biochemical pregnancy   2 Term 18 39w0d  3.827 kg (8 lb 7 oz) M CS-LTranv Spinal N DEBRA   1 Term 17 39w0d  3.56 kg (7 lb 13.6 oz) F CS-LTranv Spinal N DEBRA      Complications: Breech presentation  "      /78   Ht 5' 7" (1.702 m)   Wt 99.7 kg (219 lb 14.5 oz)   LMP  (LMP Unknown)   BMI 34.44 kg/m²         PE:   APPEARANCE: Well nourished, well developed, in no acute distress.  AFFECT: WNL, alert and oriented x 3.    COUNSELING:    New OB packet given to pt:  Info in packet include:    gave prenatal handout and answered questions.  - discussed CirfalqR85 and Inheritest; gave pamphlets and answered questions.   Optional genetic testing in packet for review.  Patient   - continue PNV daily.  - gave spotting/cramping precautions.    - discussed CDC guidelines re: COVID-19 Discussed prenatal vitamin options (i.e. stool softener, DHA).   *  Connected Moms-- register at  Initial OB visit     ASSESSMENT and PLAN:  Missed period  -     POCT urine pregnancy    Pregnancy, unspecified gestational age  -     Hepatitis C Antibody; Future; Expected date: 05/13/2022  -     HIV 1/2 Ag/Ab (4th Gen); Future; Expected date: 05/13/2022  -     RPR; Future; Expected date: 05/13/2022  -     Hepatitis B surface antigen; Future; Expected date: 05/13/2022  -     Type & Screen; Future; Expected date: 05/13/2022  -     Rubella antibody, IgG; Future; Expected date: 05/13/2022  -     Urine culture  -     CBC auto differential; Future; Expected date: 05/13/2022  -     US MFM Procedure ([facility]); Future  -     TSH; Future; Expected date: 05/13/2022  -     C. trachomatis/N. gonorrhoeae by AMP DNA [facility]; Urine        Follow up in about 4 weeks (around 6/10/2022) for PAP next visit.    ~25 minutes spent with pt Face to Face with >50% of visit spent on education/counseling.     "

## 2022-05-14 LAB — RPR SER QL: NORMAL

## 2022-05-15 LAB
BACTERIA UR CULT: NORMAL
BACTERIA UR CULT: NORMAL
C TRACH DNA SPEC QL NAA+PROBE: NOT DETECTED
N GONORRHOEA DNA SPEC QL NAA+PROBE: NOT DETECTED

## 2022-05-16 LAB
RUBV IGG SER-ACNC: 24.7 IU/ML
RUBV IGG SER-IMP: REACTIVE

## 2022-05-18 LAB
HBV SURFACE AG SERPL QL IA: NEGATIVE
HCV AB SERPL QL IA: NEGATIVE
HIV 1+2 AB+HIV1 P24 AG SERPL QL IA: NEGATIVE

## 2022-06-15 ENCOUNTER — ROUTINE PRENATAL (OUTPATIENT)
Dept: OBSTETRICS AND GYNECOLOGY | Facility: CLINIC | Age: 32
End: 2022-06-15
Payer: COMMERCIAL

## 2022-06-15 VITALS
SYSTOLIC BLOOD PRESSURE: 114 MMHG | BODY MASS INDEX: 34.25 KG/M2 | DIASTOLIC BLOOD PRESSURE: 64 MMHG | WEIGHT: 218.69 LBS

## 2022-06-15 DIAGNOSIS — Z3A.11 11 WEEKS GESTATION OF PREGNANCY: Primary | ICD-10-CM

## 2022-06-15 PROCEDURE — 99999 PR PBB SHADOW E&M-EST. PATIENT-LVL II: CPT | Mod: PBBFAC,,, | Performed by: NURSE PRACTITIONER

## 2022-06-15 PROCEDURE — 0502F SUBSEQUENT PRENATAL CARE: CPT | Mod: CPTII,S$GLB,, | Performed by: NURSE PRACTITIONER

## 2022-06-15 PROCEDURE — 99999 PR PBB SHADOW E&M-EST. PATIENT-LVL II: ICD-10-PCS | Mod: PBBFAC,,, | Performed by: NURSE PRACTITIONER

## 2022-06-15 PROCEDURE — 0502F PR SUBSEQUENT PRENATAL CARE: ICD-10-PCS | Mod: CPTII,S$GLB,, | Performed by: NURSE PRACTITIONER

## 2022-06-15 NOTE — PROGRESS NOTES
Presents at 11w6d for MEGAN. Doing well. Connected MOM initiated. Bedside US: +FHM/FM. Declines genetics. FU in 4 weeks for MEGAN.

## 2022-06-16 ENCOUNTER — PATIENT MESSAGE (OUTPATIENT)
Dept: ADMINISTRATIVE | Facility: OTHER | Age: 32
End: 2022-06-16
Payer: COMMERCIAL

## 2022-06-23 ENCOUNTER — PATIENT MESSAGE (OUTPATIENT)
Dept: ADMINISTRATIVE | Facility: OTHER | Age: 32
End: 2022-06-23
Payer: COMMERCIAL

## 2022-07-14 ENCOUNTER — ROUTINE PRENATAL (OUTPATIENT)
Dept: OBSTETRICS AND GYNECOLOGY | Facility: CLINIC | Age: 32
End: 2022-07-14
Attending: OBSTETRICS & GYNECOLOGY
Payer: COMMERCIAL

## 2022-07-14 VITALS
DIASTOLIC BLOOD PRESSURE: 72 MMHG | WEIGHT: 218.25 LBS | BODY MASS INDEX: 34.18 KG/M2 | SYSTOLIC BLOOD PRESSURE: 118 MMHG

## 2022-07-14 DIAGNOSIS — Z3A.16 16 WEEKS GESTATION OF PREGNANCY: Primary | ICD-10-CM

## 2022-07-14 PROCEDURE — 0502F PR SUBSEQUENT PRENATAL CARE: ICD-10-PCS | Mod: CPTII,S$GLB,, | Performed by: OBSTETRICS & GYNECOLOGY

## 2022-07-14 PROCEDURE — 0502F SUBSEQUENT PRENATAL CARE: CPT | Mod: CPTII,S$GLB,, | Performed by: OBSTETRICS & GYNECOLOGY

## 2022-07-14 PROCEDURE — 99999 PR PBB SHADOW E&M-EST. PATIENT-LVL III: ICD-10-PCS | Mod: PBBFAC,,, | Performed by: OBSTETRICS & GYNECOLOGY

## 2022-07-14 PROCEDURE — 99999 PR PBB SHADOW E&M-EST. PATIENT-LVL III: CPT | Mod: PBBFAC,,, | Performed by: OBSTETRICS & GYNECOLOGY

## 2022-07-25 ENCOUNTER — PATIENT MESSAGE (OUTPATIENT)
Dept: RESEARCH | Facility: OTHER | Age: 32
End: 2022-07-25
Payer: COMMERCIAL

## 2022-08-03 ENCOUNTER — PATIENT MESSAGE (OUTPATIENT)
Dept: MATERNAL FETAL MEDICINE | Facility: CLINIC | Age: 32
End: 2022-08-03
Payer: COMMERCIAL

## 2022-08-04 ENCOUNTER — PROCEDURE VISIT (OUTPATIENT)
Dept: MATERNAL FETAL MEDICINE | Facility: CLINIC | Age: 32
End: 2022-08-04
Attending: OBSTETRICS & GYNECOLOGY
Payer: COMMERCIAL

## 2022-08-04 DIAGNOSIS — Z34.90 PREGNANCY, UNSPECIFIED GESTATIONAL AGE: ICD-10-CM

## 2022-08-04 PROCEDURE — 76805 OB US >/= 14 WKS SNGL FETUS: CPT | Mod: S$GLB,,, | Performed by: OBSTETRICS & GYNECOLOGY

## 2022-08-04 PROCEDURE — 76805 US MFM PROCEDURE (VIEWPOINT): ICD-10-PCS | Mod: S$GLB,,, | Performed by: OBSTETRICS & GYNECOLOGY

## 2022-08-08 ENCOUNTER — ROUTINE PRENATAL (OUTPATIENT)
Dept: OBSTETRICS AND GYNECOLOGY | Facility: CLINIC | Age: 32
End: 2022-08-08
Attending: OBSTETRICS & GYNECOLOGY
Payer: COMMERCIAL

## 2022-08-08 VITALS
SYSTOLIC BLOOD PRESSURE: 118 MMHG | DIASTOLIC BLOOD PRESSURE: 70 MMHG | WEIGHT: 221.31 LBS | BODY MASS INDEX: 34.67 KG/M2

## 2022-08-08 DIAGNOSIS — Z3A.20 20 WEEKS GESTATION OF PREGNANCY: Primary | ICD-10-CM

## 2022-08-08 PROCEDURE — 99999 PR PBB SHADOW E&M-EST. PATIENT-LVL II: CPT | Mod: PBBFAC,,, | Performed by: OBSTETRICS & GYNECOLOGY

## 2022-08-08 PROCEDURE — 0502F PR SUBSEQUENT PRENATAL CARE: ICD-10-PCS | Mod: CPTII,S$GLB,, | Performed by: OBSTETRICS & GYNECOLOGY

## 2022-08-08 PROCEDURE — 99999 PR PBB SHADOW E&M-EST. PATIENT-LVL II: ICD-10-PCS | Mod: PBBFAC,,, | Performed by: OBSTETRICS & GYNECOLOGY

## 2022-08-08 PROCEDURE — 0502F SUBSEQUENT PRENATAL CARE: CPT | Mod: CPTII,S$GLB,, | Performed by: OBSTETRICS & GYNECOLOGY

## 2022-09-08 ENCOUNTER — PROCEDURE VISIT (OUTPATIENT)
Dept: MATERNAL FETAL MEDICINE | Facility: CLINIC | Age: 32
End: 2022-09-08
Attending: OBSTETRICS & GYNECOLOGY
Payer: COMMERCIAL

## 2022-09-08 DIAGNOSIS — Z36.89 ENCOUNTER FOR ULTRASOUND TO ASSESS FETAL GROWTH: ICD-10-CM

## 2022-09-08 DIAGNOSIS — Z36.2 ENCOUNTER FOR FOLLOW-UP ULTRASOUND OF FETAL ANATOMY: ICD-10-CM

## 2022-09-08 PROCEDURE — 76816 US MFM PROCEDURE (VIEWPOINT): ICD-10-PCS | Mod: S$GLB,,, | Performed by: OBSTETRICS & GYNECOLOGY

## 2022-09-08 PROCEDURE — 76816 OB US FOLLOW-UP PER FETUS: CPT | Mod: S$GLB,,, | Performed by: OBSTETRICS & GYNECOLOGY

## 2022-09-12 ENCOUNTER — ROUTINE PRENATAL (OUTPATIENT)
Dept: OBSTETRICS AND GYNECOLOGY | Facility: CLINIC | Age: 32
End: 2022-09-12
Attending: OBSTETRICS & GYNECOLOGY
Payer: COMMERCIAL

## 2022-09-12 ENCOUNTER — TELEPHONE (OUTPATIENT)
Dept: OBSTETRICS AND GYNECOLOGY | Facility: CLINIC | Age: 32
End: 2022-09-12
Payer: COMMERCIAL

## 2022-09-12 VITALS
DIASTOLIC BLOOD PRESSURE: 72 MMHG | BODY MASS INDEX: 34.87 KG/M2 | SYSTOLIC BLOOD PRESSURE: 120 MMHG | WEIGHT: 222.69 LBS

## 2022-09-12 DIAGNOSIS — Z98.891 HISTORY OF CESAREAN DELIVERY: Primary | ICD-10-CM

## 2022-09-12 DIAGNOSIS — Z3A.24 24 WEEKS GESTATION OF PREGNANCY: Primary | ICD-10-CM

## 2022-09-12 PROCEDURE — 0502F PR SUBSEQUENT PRENATAL CARE: ICD-10-PCS | Mod: CPTII,S$GLB,, | Performed by: OBSTETRICS & GYNECOLOGY

## 2022-09-12 PROCEDURE — 99999 PR PBB SHADOW E&M-EST. PATIENT-LVL III: CPT | Mod: PBBFAC,,, | Performed by: OBSTETRICS & GYNECOLOGY

## 2022-09-12 PROCEDURE — 99999 PR PBB SHADOW E&M-EST. PATIENT-LVL III: ICD-10-PCS | Mod: PBBFAC,,, | Performed by: OBSTETRICS & GYNECOLOGY

## 2022-09-12 PROCEDURE — 0502F SUBSEQUENT PRENATAL CARE: CPT | Mod: CPTII,S$GLB,, | Performed by: OBSTETRICS & GYNECOLOGY

## 2022-09-12 NOTE — TELEPHONE ENCOUNTER
SAJI AND ASHA:  PLEASE SEND DATE AND TIME TO RAMAN TO PUT ON GOOGLE AND EPIC AND TO FELICE TO PUT ON OB LIST  DON'T FORGET TO CALL PT AND LET HER KNOW ALSO#####          Procedure: answer Y where needed       Induction     Pitocin_____     Cytotec____     Balloon____     Other______         Primary____      Repeat__x__    BTL _____________    Cerclage _________       Indication (elective/other)  Prev c/s x 2   39 weeks     Date desired   Time desired 730     Due Date     Cervical exam- (inductions only)   Dilation:   Effacement:   Station:   Consistency:   Position:      HOLGUIN SCORE____________

## 2022-09-12 NOTE — PROGRESS NOTES
No c/o Doing well   U/s at 24 weeks good  Active FM  F/u 4 weeks and do labs   Will sched c/s for Dec

## 2022-09-12 NOTE — TELEPHONE ENCOUNTER
C/s order set requested for 12/22/2022 at 0830    Message sent to pt regarding vaccination status and PP appt.

## 2022-09-15 ENCOUNTER — PATIENT MESSAGE (OUTPATIENT)
Dept: ADMINISTRATIVE | Facility: OTHER | Age: 32
End: 2022-09-15
Payer: COMMERCIAL

## 2022-10-06 ENCOUNTER — PATIENT MESSAGE (OUTPATIENT)
Dept: ADMINISTRATIVE | Facility: OTHER | Age: 32
End: 2022-10-06
Payer: COMMERCIAL

## 2022-10-10 ENCOUNTER — ROUTINE PRENATAL (OUTPATIENT)
Dept: OBSTETRICS AND GYNECOLOGY | Facility: CLINIC | Age: 32
End: 2022-10-10
Attending: OBSTETRICS & GYNECOLOGY
Payer: COMMERCIAL

## 2022-10-10 ENCOUNTER — LAB VISIT (OUTPATIENT)
Dept: LAB | Facility: HOSPITAL | Age: 32
End: 2022-10-10
Attending: OBSTETRICS & GYNECOLOGY
Payer: COMMERCIAL

## 2022-10-10 VITALS — WEIGHT: 226.5 LBS | BODY MASS INDEX: 35.48 KG/M2 | SYSTOLIC BLOOD PRESSURE: 120 MMHG | DIASTOLIC BLOOD PRESSURE: 70 MMHG

## 2022-10-10 DIAGNOSIS — Z3A.24 24 WEEKS GESTATION OF PREGNANCY: ICD-10-CM

## 2022-10-10 DIAGNOSIS — Z3A.28 28 WEEKS GESTATION OF PREGNANCY: Primary | ICD-10-CM

## 2022-10-10 LAB
BASOPHILS # BLD AUTO: 0.01 K/UL (ref 0–0.2)
BASOPHILS NFR BLD: 0.2 % (ref 0–1.9)
DIFFERENTIAL METHOD: ABNORMAL
EOSINOPHIL # BLD AUTO: 0.1 K/UL (ref 0–0.5)
EOSINOPHIL NFR BLD: 1.4 % (ref 0–8)
ERYTHROCYTE [DISTWIDTH] IN BLOOD BY AUTOMATED COUNT: 15.9 % (ref 11.5–14.5)
GLUCOSE SERPL-MCNC: 155 MG/DL (ref 70–140)
HCT VFR BLD AUTO: 32.7 % (ref 37–48.5)
HGB BLD-MCNC: 10 G/DL (ref 12–16)
IMM GRANULOCYTES # BLD AUTO: 0.02 K/UL (ref 0–0.04)
IMM GRANULOCYTES NFR BLD AUTO: 0.3 % (ref 0–0.5)
LYMPHOCYTES # BLD AUTO: 1.2 K/UL (ref 1–4.8)
LYMPHOCYTES NFR BLD: 19 % (ref 18–48)
MCH RBC QN AUTO: 24.9 PG (ref 27–31)
MCHC RBC AUTO-ENTMCNC: 30.6 G/DL (ref 32–36)
MCV RBC AUTO: 81 FL (ref 82–98)
MONOCYTES # BLD AUTO: 0.2 K/UL (ref 0.3–1)
MONOCYTES NFR BLD: 3 % (ref 4–15)
NEUTROPHILS # BLD AUTO: 4.8 K/UL (ref 1.8–7.7)
NEUTROPHILS NFR BLD: 76.1 % (ref 38–73)
NRBC BLD-RTO: 0 /100 WBC
PLATELET # BLD AUTO: 166 K/UL (ref 150–450)
PMV BLD AUTO: 11.8 FL (ref 9.2–12.9)
RBC # BLD AUTO: 4.02 M/UL (ref 4–5.4)
WBC # BLD AUTO: 6.26 K/UL (ref 3.9–12.7)

## 2022-10-10 PROCEDURE — 0502F PR SUBSEQUENT PRENATAL CARE: ICD-10-PCS | Mod: CPTII,S$GLB,, | Performed by: OBSTETRICS & GYNECOLOGY

## 2022-10-10 PROCEDURE — 85025 COMPLETE CBC W/AUTO DIFF WBC: CPT | Performed by: OBSTETRICS & GYNECOLOGY

## 2022-10-10 PROCEDURE — 82950 GLUCOSE TEST: CPT | Performed by: OBSTETRICS & GYNECOLOGY

## 2022-10-10 PROCEDURE — 99999 PR PBB SHADOW E&M-EST. PATIENT-LVL II: CPT | Mod: PBBFAC,,, | Performed by: OBSTETRICS & GYNECOLOGY

## 2022-10-10 PROCEDURE — 99999 PR PBB SHADOW E&M-EST. PATIENT-LVL II: ICD-10-PCS | Mod: PBBFAC,,, | Performed by: OBSTETRICS & GYNECOLOGY

## 2022-10-10 PROCEDURE — 0502F SUBSEQUENT PRENATAL CARE: CPT | Mod: CPTII,S$GLB,, | Performed by: OBSTETRICS & GYNECOLOGY

## 2022-10-10 NOTE — PROGRESS NOTES
No c/o Doing well   GTT and CBC today  Has growth scan sched with MFM11/3 and appt with me in 4 wks  Active FM - going to get a belly band due to increased pressure

## 2022-10-11 ENCOUNTER — TELEPHONE (OUTPATIENT)
Dept: OBSTETRICS AND GYNECOLOGY | Facility: CLINIC | Age: 32
End: 2022-10-11
Payer: COMMERCIAL

## 2022-10-11 NOTE — TELEPHONE ENCOUNTER
----- Message from Robert Shields MD sent at 10/10/2022  9:12 PM CDT -----  Please order 3 hr GTT and call and schedule her

## 2022-10-12 NOTE — TELEPHONE ENCOUNTER
Robert Shields MD routed conversation to You 15 hours ago (6:14 PM)     Robert Shields MD 15 hours ago (6:13 PM)     lease call pt to get her 3 GTT scheduled   Thank you         Note      Robert Shields MD 15 hours ago (6:13 PM)     ---- Message from Robert Shields MD sent at 10/10/2022  9:12 PM CDT -----  Please order 3 hr GTT and call and schedule her

## 2022-10-19 ENCOUNTER — TELEPHONE (OUTPATIENT)
Dept: OBSTETRICS AND GYNECOLOGY | Facility: CLINIC | Age: 32
End: 2022-10-19
Payer: COMMERCIAL

## 2022-10-19 DIAGNOSIS — O99.810 ABNORMAL GLUCOSE AFFECTING PREGNANCY: Primary | ICD-10-CM

## 2022-10-19 NOTE — TELEPHONE ENCOUNTER
Briseyda GAVIN Staff 2 hours ago (8:43 AM)     CM  Pt called in requesting 3 hour GTT orders to be schedule. Pt would like a call to set up

## 2022-10-24 ENCOUNTER — LAB VISIT (OUTPATIENT)
Dept: LAB | Facility: HOSPITAL | Age: 32
End: 2022-10-24
Attending: OBSTETRICS & GYNECOLOGY
Payer: COMMERCIAL

## 2022-10-24 DIAGNOSIS — O99.810 ABNORMAL GLUCOSE AFFECTING PREGNANCY: ICD-10-CM

## 2022-10-24 LAB
GLUCOSE SERPL-MCNC: 114 MG/DL
GLUCOSE SERPL-MCNC: 132 MG/DL
GLUCOSE SERPL-MCNC: 81 MG/DL (ref 70–110)
GLUCOSE SERPL-MCNC: 90 MG/DL

## 2022-10-24 PROCEDURE — 82951 GLUCOSE TOLERANCE TEST (GTT): CPT | Performed by: OBSTETRICS & GYNECOLOGY

## 2022-11-02 ENCOUNTER — PATIENT MESSAGE (OUTPATIENT)
Dept: MATERNAL FETAL MEDICINE | Facility: CLINIC | Age: 32
End: 2022-11-02
Payer: COMMERCIAL

## 2022-11-03 ENCOUNTER — PROCEDURE VISIT (OUTPATIENT)
Dept: MATERNAL FETAL MEDICINE | Facility: CLINIC | Age: 32
End: 2022-11-03
Payer: COMMERCIAL

## 2022-11-03 ENCOUNTER — PATIENT MESSAGE (OUTPATIENT)
Dept: ADMINISTRATIVE | Facility: OTHER | Age: 32
End: 2022-11-03
Payer: COMMERCIAL

## 2022-11-03 DIAGNOSIS — Z36.2 ENCOUNTER FOR FOLLOW-UP ULTRASOUND OF FETAL ANATOMY: ICD-10-CM

## 2022-11-03 PROCEDURE — 76816 OB US FOLLOW-UP PER FETUS: CPT | Mod: S$GLB,,, | Performed by: OBSTETRICS & GYNECOLOGY

## 2022-11-03 PROCEDURE — 76816 US MFM PROCEDURE (VIEWPOINT): ICD-10-PCS | Mod: S$GLB,,, | Performed by: OBSTETRICS & GYNECOLOGY

## 2022-11-06 ENCOUNTER — HOSPITAL ENCOUNTER (EMERGENCY)
Facility: OTHER | Age: 32
Discharge: HOME | End: 2022-11-06
Attending: OBSTETRICS & GYNECOLOGY
Payer: COMMERCIAL

## 2022-11-06 VITALS
TEMPERATURE: 98 F | SYSTOLIC BLOOD PRESSURE: 105 MMHG | DIASTOLIC BLOOD PRESSURE: 63 MMHG | BODY MASS INDEX: 35.47 KG/M2 | HEIGHT: 67 IN | RESPIRATION RATE: 18 BRPM | WEIGHT: 226 LBS | HEART RATE: 103 BPM | OXYGEN SATURATION: 99 %

## 2022-11-06 DIAGNOSIS — Z3A.32 32 WEEKS GESTATION OF PREGNANCY: ICD-10-CM

## 2022-11-06 DIAGNOSIS — J11.1 INFLUENZA: Primary | ICD-10-CM

## 2022-11-06 LAB
BILIRUB SERPL-MCNC: NORMAL MG/DL
BLOOD URINE, POC: NEGATIVE
COLOR, POC UA: NORMAL
GLUCOSE UR QL STRIP: NORMAL
KETONES UR QL STRIP: NORMAL
LEUKOCYTE ESTERASE URINE, POC: NEGATIVE
NITRITE, POC UA: NORMAL
PH, POC UA: 6
PROTEIN, POC: NORMAL
SPECIFIC GRAVITY, POC UA: 1.02
UROBILINOGEN, POC UA: NORMAL

## 2022-11-06 PROCEDURE — 99284 EMERGENCY DEPT VISIT MOD MDM: CPT | Mod: 25

## 2022-11-06 PROCEDURE — 59025 FETAL NON-STRESS TEST: CPT

## 2022-11-06 PROCEDURE — 81002 URINALYSIS NONAUTO W/O SCOPE: CPT

## 2022-11-06 PROCEDURE — 59025 FETAL NON-STRESS TEST: CPT | Mod: 26,,, | Performed by: OBSTETRICS & GYNECOLOGY

## 2022-11-06 PROCEDURE — 99284 PR EMERGENCY DEPT VISIT,LEVEL IV: ICD-10-PCS | Mod: 25,,, | Performed by: OBSTETRICS & GYNECOLOGY

## 2022-11-06 PROCEDURE — 59025 PR FETAL 2N-STRESS TEST: ICD-10-PCS | Mod: 26,,, | Performed by: OBSTETRICS & GYNECOLOGY

## 2022-11-06 PROCEDURE — 99284 EMERGENCY DEPT VISIT MOD MDM: CPT | Mod: 25,,, | Performed by: OBSTETRICS & GYNECOLOGY

## 2022-11-06 NOTE — Clinical Note
Pt recently went to urgent care for flu like symptoms. While there pt stated to have one mild elevated blood pressure.

## 2022-11-06 NOTE — ED PROVIDER NOTES
Encounter Date: 2022       History     Chief Complaint   Patient presents with    Hypertension     HPI  Leydi Matias is a 32 y.o.  at 32w3d presents complaining of mild range BP at urgent care.     Recently tested positive for influenza at urgent care. Denies chest pain, dyspnea. Endorses cough. Denies HA VC RUQ pain SOB. Has a prescription from urgent care for Tamiflu.     This IUP is complicated by GERD, h/o LTCS x2, HSV.  Patient denies contractions, denies vaginal bleeding, denies LOF.   Fetal Movement: normal.     Review of patient's allergies indicates:  No Known Allergies  Past Medical History:   Diagnosis Date    Acid reflux     Family history of breast cancer in mother     Herpes simplex virus (HSV) infection     Previous  delivery affecting pregnancy 2018     Past Surgical History:   Procedure Laterality Date    BREAST SURGERY      AUGMENTATION     SECTION N/A 2018    Procedure:  SECTION;  Surgeon: Robert Shields MD;  Location: [facility];  Service: OB/GYN;  Laterality: N/A;     SECTION  2017, 18    BREECH, female    TONSILLECTOMY       Family History   Problem Relation Age of Onset    No Known Problems Father     Breast cancer Mother 45    Hyperlipidemia Mother     No Known Problems Daughter     No Known Problems Son     Colon cancer Neg Hx     Ovarian cancer Neg Hx      Social History     Tobacco Use    Smoking status: Never    Smokeless tobacco: Never   Substance Use Topics    Alcohol use: No     Alcohol/week: 0.0 standard drinks    Drug use: No     Review of Systems   Constitutional:  Negative for fever.   HENT:  Negative for sore throat.    Respiratory:  Negative for shortness of breath.    Cardiovascular:  Negative for chest pain.   Gastrointestinal:  Negative for nausea.   Genitourinary:  Negative for dysuria.   Musculoskeletal:  Negative for back pain.   Skin:  Negative for rash.   Neurological:  Negative for weakness.    Hematological:  Does not bruise/bleed easily.     Physical Exam     Initial Vitals   BP Pulse Resp Temp SpO2   11/06/22 1310 11/06/22 1310 11/06/22 1310 11/06/22 1310 11/06/22 1311   (!) 108/56 (!) 115 16 98.2 °F (36.8 °C) 98 %      MAP       --                Physical Exam    Constitutional: She appears well-developed and well-nourished. No distress.   HENT:   Head: Normocephalic and atraumatic.   Neck:   Normal range of motion.  Cardiovascular:  Normal rate and intact distal pulses.           Pulmonary/Chest: Breath sounds normal. No respiratory distress. She has no wheezes.   Abdominal: Abdomen is soft. She exhibits no distension. There is no abdominal tenderness. There is no rebound.   Musculoskeletal:         General: No edema. Normal range of motion.      Cervical back: Normal range of motion.     Neurological: She is alert and oriented to person, place, and time.   Skin: Skin is warm and dry. No rash noted. No erythema.   Psychiatric: She has a normal mood and affect. Her behavior is normal. Judgment and thought content normal.       ED Course   Obtain Fetal nonstress test (NST)    Date/Time: 11/6/2022 1:30 PM  Performed by: Mckayla Chamberlain MD  Authorized by: Mckayla Chamberlain MD     Nonstress Test:     Variability:  6-25 BPM    Decelerations:  None    Accelerations:  15 bpm    Baseline:  140    Contractions:  Not present  Biophysical Profile:     Nonstress Test Interpretation: reactive      Overall Impression:  Reassuring  Labs Reviewed   POCT URINALYSIS, DIPSTICK OR TABLET REAGENT, AUTOMATED, WITH MICROSCOP          Imaging Results    None          Medications - No data to display  Medical Decision Making:   ED Management:  VSS: serial BP measurements, all WNL   NST RR     Next appointment with Dr Cornelius zhang Thursday 11/10.      Patient counseled on third trimester return precautions, flu and BP return precautions.     Sanjana Chamberlain MD  PGY 2  Obstetrics and Gynecology           Attending Attestation:   Physician  Attestation Statement for Resident:  As the supervising MD   Physician Attestation Statement: I have personally seen and examined this patient.   I agree with the above history.  -:   As the supervising MD I agree with the above PE.     As the supervising MD I agree with the above treatment, course, plan, and disposition.   -: Pt given tamiflu for influenza treatment from urgent care but came here after urgent care expressed concerned over her tachycardia and BP.  Pt unsure what those vitals were but mother in a RN and had a mild range BP at home.  Normal BP here and normal pulse range in setting of influenza and pregnancy.  Reviewed taking tamiflu and precautions given.    NST  I independently reviewed the fetal non-stress test with the following interpretation:  140 BPM baseline  Variability: moderate  Accelerations: present  Decelerations: absent  Contractions: none  Category 1    Clinical Interpretation:reactive    Patient evaluated and found to be stable, agree with resident's assessment and plan.    I was personally present during the critical portions of the procedure(s) performed by the resident and was immediately available in the ED to provide services and assistance as needed during the entire procedure.  I have reviewed and agree with the residents interpretation of the following: lab data.  I have reviewed the following: old records at this facility.                           Clinical Impression:   Final diagnoses:  [R03.0] Elevated blood pressure reading (Primary)  [J11.1] Influenza      ED Disposition Condition    Discharge Stable          ED Prescriptions    None       Follow-up Information    None          Mckayla Chamberlain MD  Resident  11/06/22 1409       Mckayla Chamberlain MD  Resident  11/06/22 1409       Roxi Dahl MD  11/06/22 1507

## 2022-11-08 ENCOUNTER — TELEPHONE (OUTPATIENT)
Dept: OBSTETRICS AND GYNECOLOGY | Facility: CLINIC | Age: 32
End: 2022-11-08
Payer: COMMERCIAL

## 2022-11-08 NOTE — TELEPHONE ENCOUNTER
32 and 5 OB pt reports she was diagnosed with the flu on 11/6 in the [facility].  +FM, denies VB or LOF.  Taking Tamiflu.  Denies fever.  Inquiring if ok to come to appt tomorrow or if it's even needed since she was just seen in the [facility].    I advised her that if she wasn't feeling terrible, she could keep her appt with a mask on at her visit.  But if you don't think she needs to come, let me know.      Next OB appts 11/9 and 11/23

## 2022-11-14 ENCOUNTER — TELEPHONE (OUTPATIENT)
Dept: OBSTETRICS AND GYNECOLOGY | Facility: CLINIC | Age: 32
End: 2022-11-14
Payer: COMMERCIAL

## 2022-11-23 ENCOUNTER — CLINICAL SUPPORT (OUTPATIENT)
Dept: OBSTETRICS AND GYNECOLOGY | Facility: CLINIC | Age: 32
End: 2022-11-23
Attending: OBSTETRICS & GYNECOLOGY
Payer: COMMERCIAL

## 2022-11-23 VITALS
SYSTOLIC BLOOD PRESSURE: 122 MMHG | DIASTOLIC BLOOD PRESSURE: 70 MMHG | BODY MASS INDEX: 35.55 KG/M2 | WEIGHT: 226.94 LBS

## 2022-11-23 DIAGNOSIS — O26.849 FETAL SIZE INCONSISTENT WITH DATES: ICD-10-CM

## 2022-11-23 DIAGNOSIS — Z23 NEED FOR TDAP VACCINATION: ICD-10-CM

## 2022-11-23 DIAGNOSIS — Z3A.35 35 WEEKS GESTATION OF PREGNANCY: Primary | ICD-10-CM

## 2022-11-23 DIAGNOSIS — Z23 NEED FOR TDAP VACCINATION: Primary | ICD-10-CM

## 2022-11-23 PROCEDURE — 0502F SUBSEQUENT PRENATAL CARE: CPT | Mod: CPTII,S$GLB,, | Performed by: OBSTETRICS & GYNECOLOGY

## 2022-11-23 PROCEDURE — 99999 PR PBB SHADOW E&M-EST. PATIENT-LVL I: ICD-10-PCS | Mod: PBBFAC,,,

## 2022-11-23 PROCEDURE — 90715 TDAP VACCINE 7 YRS/> IM: CPT | Mod: S$GLB,,, | Performed by: OBSTETRICS & GYNECOLOGY

## 2022-11-23 PROCEDURE — 99999 PR PBB SHADOW E&M-EST. PATIENT-LVL III: ICD-10-PCS | Mod: PBBFAC,,, | Performed by: OBSTETRICS & GYNECOLOGY

## 2022-11-23 PROCEDURE — 0502F PR SUBSEQUENT PRENATAL CARE: ICD-10-PCS | Mod: CPTII,S$GLB,, | Performed by: OBSTETRICS & GYNECOLOGY

## 2022-11-23 PROCEDURE — 99999 PR PBB SHADOW E&M-EST. PATIENT-LVL I: CPT | Mod: PBBFAC,,,

## 2022-11-23 PROCEDURE — 90471 IMMUNIZATION ADMIN: CPT | Mod: S$GLB,,, | Performed by: OBSTETRICS & GYNECOLOGY

## 2022-11-23 PROCEDURE — 99999 PR PBB SHADOW E&M-EST. PATIENT-LVL III: CPT | Mod: PBBFAC,,, | Performed by: OBSTETRICS & GYNECOLOGY

## 2022-11-23 PROCEDURE — 90715 TDAP VACCINE GREATER THAN OR EQUAL TO 7YO IM: ICD-10-PCS | Mod: S$GLB,,, | Performed by: OBSTETRICS & GYNECOLOGY

## 2022-11-23 PROCEDURE — 90471 TDAP VACCINE GREATER THAN OR EQUAL TO 7YO IM: ICD-10-PCS | Mod: S$GLB,,, | Performed by: OBSTETRICS & GYNECOLOGY

## 2022-11-23 NOTE — PROGRESS NOTES
11/23/22 Pt provided Tdap VIS,  Pt administered Tdap to the left  Deltoid. Pt tolerated well. Pt instructed to wait 15 minutes in the waiting room following injection in case of reaction. Pt verbalizes understanding.     Ordering Provider:Dr Shields    Pain before: None    Pain after: None     Patient complaints:none

## 2022-11-23 NOTE — PROGRESS NOTES
Just got over flu  Active FM No c/o   tdap today and flu shot next visit   GBBS labs and consents next visit  Sched for c/s on 12/22  U/s for size on 12/5 or 12/12

## 2022-11-29 ENCOUNTER — TELEPHONE (OUTPATIENT)
Dept: OBSTETRICS AND GYNECOLOGY | Facility: CLINIC | Age: 32
End: 2022-11-29
Payer: COMMERCIAL

## 2022-12-01 ENCOUNTER — TELEPHONE (OUTPATIENT)
Dept: OBSTETRICS AND GYNECOLOGY | Facility: CLINIC | Age: 32
End: 2022-12-01
Payer: COMMERCIAL

## 2022-12-02 NOTE — TELEPHONE ENCOUNTER
Lisa GAVIN Staff 4 hours ago (9:21 AM)     MA  Patient said if she gets a note written by dr stout that she needs BRCA  test, they might pay for this, please call pt when you can,no santiago      Leydi Matias [phone]  Lisa Bearden 4 hours ago (9:19 AM)     WU Mathews Staff Yesterday (11:07 AM)     LJ  Pt returned katarzyna call .      Leydi Matias [phone]  Susanne Roland MA Yesterday (11:06 AM

## 2022-12-05 ENCOUNTER — LAB VISIT (OUTPATIENT)
Dept: LAB | Facility: HOSPITAL | Age: 32
End: 2022-12-05
Attending: OBSTETRICS & GYNECOLOGY
Payer: COMMERCIAL

## 2022-12-05 ENCOUNTER — ROUTINE PRENATAL (OUTPATIENT)
Dept: OBSTETRICS AND GYNECOLOGY | Facility: CLINIC | Age: 32
End: 2022-12-05
Attending: OBSTETRICS & GYNECOLOGY
Payer: COMMERCIAL

## 2022-12-05 VITALS — BODY MASS INDEX: 36.26 KG/M2 | SYSTOLIC BLOOD PRESSURE: 130 MMHG | DIASTOLIC BLOOD PRESSURE: 72 MMHG | WEIGHT: 231.5 LBS

## 2022-12-05 DIAGNOSIS — Z36.85 ANTENATAL SCREENING FOR STREPTOCOCCUS B: ICD-10-CM

## 2022-12-05 DIAGNOSIS — Z3A.36 36 WEEKS GESTATION OF PREGNANCY: ICD-10-CM

## 2022-12-05 DIAGNOSIS — Z3A.36 36 WEEKS GESTATION OF PREGNANCY: Primary | ICD-10-CM

## 2022-12-05 LAB — HIV 1+2 AB+HIV1 P24 AG SERPL QL IA: NORMAL

## 2022-12-05 PROCEDURE — 87081 CULTURE SCREEN ONLY: CPT | Performed by: OBSTETRICS & GYNECOLOGY

## 2022-12-05 PROCEDURE — 0502F PR SUBSEQUENT PRENATAL CARE: ICD-10-PCS | Mod: CPTII,S$GLB,, | Performed by: OBSTETRICS & GYNECOLOGY

## 2022-12-05 PROCEDURE — 90686 IIV4 VACC NO PRSV 0.5 ML IM: CPT | Mod: S$GLB,,, | Performed by: OBSTETRICS & GYNECOLOGY

## 2022-12-05 PROCEDURE — 87389 HIV-1 AG W/HIV-1&-2 AB AG IA: CPT | Performed by: OBSTETRICS & GYNECOLOGY

## 2022-12-05 PROCEDURE — 86592 SYPHILIS TEST NON-TREP QUAL: CPT | Performed by: OBSTETRICS & GYNECOLOGY

## 2022-12-05 PROCEDURE — 99999 PR PBB SHADOW E&M-EST. PATIENT-LVL II: CPT | Mod: PBBFAC,,, | Performed by: OBSTETRICS & GYNECOLOGY

## 2022-12-05 PROCEDURE — 90471 FLU VACCINE (QUAD) GREATER THAN OR EQUAL TO 3YO PRESERVATIVE FREE IM: ICD-10-PCS | Mod: S$GLB,,, | Performed by: OBSTETRICS & GYNECOLOGY

## 2022-12-05 PROCEDURE — 99999 PR PBB SHADOW E&M-EST. PATIENT-LVL II: ICD-10-PCS | Mod: PBBFAC,,, | Performed by: OBSTETRICS & GYNECOLOGY

## 2022-12-05 PROCEDURE — 90471 IMMUNIZATION ADMIN: CPT | Mod: S$GLB,,, | Performed by: OBSTETRICS & GYNECOLOGY

## 2022-12-05 PROCEDURE — 90686 FLU VACCINE (QUAD) GREATER THAN OR EQUAL TO 3YO PRESERVATIVE FREE IM: ICD-10-PCS | Mod: S$GLB,,, | Performed by: OBSTETRICS & GYNECOLOGY

## 2022-12-05 PROCEDURE — 0502F SUBSEQUENT PRENATAL CARE: CPT | Mod: CPTII,S$GLB,, | Performed by: OBSTETRICS & GYNECOLOGY

## 2022-12-05 NOTE — PROGRESS NOTES
GBBS & labs today  
No c/o Doing well   pt desires BTL with LTCS   Feeling pressure No ctx  Labs and GBBS today  Consents for c/s and BTL next visit  U/s next visit  Flu shot today  
Detail Level: Detailed
Plan: Patient has responded well to current treatment regimen, will continue at this time
Continue Regimen: Seysara 150mg once daily \\nAczone 7.5% once daily to full face

## 2022-12-06 LAB — RPR SER QL: NORMAL

## 2022-12-07 LAB — BACTERIA SPEC AEROBE CULT: NORMAL

## 2022-12-12 ENCOUNTER — PROCEDURE VISIT (OUTPATIENT)
Dept: OBSTETRICS AND GYNECOLOGY | Facility: CLINIC | Age: 32
End: 2022-12-12
Attending: OBSTETRICS & GYNECOLOGY
Payer: COMMERCIAL

## 2022-12-12 VITALS — DIASTOLIC BLOOD PRESSURE: 74 MMHG | BODY MASS INDEX: 36.26 KG/M2 | WEIGHT: 231.5 LBS | SYSTOLIC BLOOD PRESSURE: 134 MMHG

## 2022-12-12 DIAGNOSIS — O26.849 FETAL SIZE INCONSISTENT WITH DATES: ICD-10-CM

## 2022-12-12 DIAGNOSIS — Z3A.37 37 WEEKS GESTATION OF PREGNANCY: Primary | ICD-10-CM

## 2022-12-12 DIAGNOSIS — B00.9 HSV INFECTION: ICD-10-CM

## 2022-12-12 PROCEDURE — 0502F PR SUBSEQUENT PRENATAL CARE: ICD-10-PCS | Mod: CPTII,S$GLB,, | Performed by: OBSTETRICS & GYNECOLOGY

## 2022-12-12 PROCEDURE — 99999 PR PBB SHADOW E&M-EST. PATIENT-LVL II: CPT | Mod: PBBFAC,,, | Performed by: OBSTETRICS & GYNECOLOGY

## 2022-12-12 PROCEDURE — 76816 US OB/GYN EXTENDED PROCEDURE (VIEWPOINT): ICD-10-PCS | Mod: S$GLB,,, | Performed by: OBSTETRICS & GYNECOLOGY

## 2022-12-12 PROCEDURE — 99999 PR PBB SHADOW E&M-EST. PATIENT-LVL II: ICD-10-PCS | Mod: PBBFAC,,, | Performed by: OBSTETRICS & GYNECOLOGY

## 2022-12-12 PROCEDURE — 0502F SUBSEQUENT PRENATAL CARE: CPT | Mod: CPTII,S$GLB,, | Performed by: OBSTETRICS & GYNECOLOGY

## 2022-12-12 PROCEDURE — 76816 OB US FOLLOW-UP PER FETUS: CPT | Mod: S$GLB,,, | Performed by: OBSTETRICS & GYNECOLOGY

## 2022-12-12 RX ORDER — VALACYCLOVIR HYDROCHLORIDE 500 MG/1
500 TABLET, FILM COATED ORAL DAILY
Qty: 30 TABLET | Refills: 6 | Status: ON HOLD | OUTPATIENT
Start: 2022-12-12 | End: 2022-12-22 | Stop reason: HOSPADM

## 2022-12-12 NOTE — PROGRESS NOTES
No c/o Doing well    section and BTL for next week   Start Valtrex just for prevention u/s today Vtx MVP 4.8 8#2 oa 85%  Placenta anterior

## 2022-12-21 NOTE — H&P
HISTORY AND PHYSICAL                                                OBSTETRICS          Subjective:       Leydi Matias is a 32 y.o.  female with IUP at 39w0d for repeat LTCS and BTL  Patient denies vaginal bleeding, contractions, LOF.   Fetal Movement: normal.    This IUP is complicated by prev c/s x 2   H/o HSV on prophy valtrex   Declined genetic testing     PMHx:   Past Medical History:   Diagnosis Date    Acid reflux     Family history of breast cancer in mother     Herpes simplex virus (HSV) infection     Previous  delivery affecting pregnancy 2018       PSHx:   Past Surgical History:   Procedure Laterality Date    BREAST SURGERY      AUGMENTATION     SECTION N/A 2018    Procedure:  SECTION;  Surgeon: Robert Shields MD;  Location: [facility] L&D;  Service: OB/GYN;  Laterality: N/A;     SECTION  2017, 18    BREECH, female    TONSILLECTOMY         All: Review of patient's allergies indicates:  No Known Allergies    Meds: PNV and Valtrex    SH:   Social History     Socioeconomic History    Marital status:    Tobacco Use    Smoking status: Never    Smokeless tobacco: Never   Substance and Sexual Activity    Alcohol use: No     Alcohol/week: 0.0 standard drinks    Drug use: No    Sexual activity: Not Currently     Partners: Male     Birth control/protection: None     Comment: :  Ha       FH:   Family History   Problem Relation Age of Onset    No Known Problems Father     Breast cancer Mother 45    Hyperlipidemia Mother     No Known Problems Daughter     No Known Problems Son     Colon cancer Neg Hx     Ovarian cancer Neg Hx        OBHx:   OB History    Para Term  AB Living   4 2 2 0 1 2   SAB IAB Ectopic Multiple Live Births   1 0 0 0 2      # Outcome Date GA Lbr Rocco/2nd Weight Sex Delivery Anes PTL Lv   4 Current            3 SAB               Complications: Biochemical pregnancy   2 Term 18 39w0d  3.827 kg  (8 lb 7 oz) M CS-LTranv Spinal N DEBRA      Name: Wesley      Apgar1: 9  Apgar5: 9   1 Term 04/11/17 39w0d  3.56 kg (7 lb 13.6 oz) F CS-LTranv Spinal N DEBRA      Complications: Breech presentation      Name: Lisa Soler      Apgar1: 8  Apgar5: 9       Objective:       LMP  (LMP Unknown)     There were no vitals filed for this visit.    General:   alert, appears stated age and cooperative, no apparent distress   HENT:  normocephalic, atraumatic   Eyes:  extraocular movements and conjunctivae normal   Neck:  supple, range of motion normal, no thyromegaly   Lungs:   no respiratory distress   Heart:   regular rate   Abdomen:  Nontender, gravid   Extremities positive edema, negative erythema   FHT: Verified in clinic and NST to be done upon L&D admit   Presentations: cephalic by ultrasound   Cervix: 0/50/-3     Recent Growth Scan: 12/12 Vtx MVP 4.8 EFW 8#2 85%    Lab Review  Blood Type A POS  GBBS: negative  Rubella: Immune  RPR: nonreactive  HIV: negative  HepB: negative  Hep C neg        Assessment:       39w0d weeks gestation   Prev LTCS x 2 sdesires repeat  UF desires BTL  Hx hsv on valtrex after 35 weeks        Plan:      Risks, benefits, alternatives and possible complications have been discussed in detail with the patient.   - Consents signed and in Media  -For repeat LTCS and BTL  - pt aware of the role of hospitalists and residents as a part of our hospital care team    Post-Partum Hemorrhage risk - low

## 2022-12-22 ENCOUNTER — ANESTHESIA EVENT (OUTPATIENT)
Dept: OBSTETRICS AND GYNECOLOGY | Facility: OTHER | Age: 32
End: 2022-12-22
Payer: COMMERCIAL

## 2022-12-22 ENCOUNTER — HOSPITAL ENCOUNTER (INPATIENT)
Facility: OTHER | Age: 32
LOS: 2 days | Discharge: HOME | End: 2022-12-24
Attending: OBSTETRICS & GYNECOLOGY | Admitting: OBSTETRICS & GYNECOLOGY
Payer: COMMERCIAL

## 2022-12-22 ENCOUNTER — ANESTHESIA (OUTPATIENT)
Dept: OBSTETRICS AND GYNECOLOGY | Facility: OTHER | Age: 32
End: 2022-12-22
Payer: COMMERCIAL

## 2022-12-22 DIAGNOSIS — Z98.891 STATUS POST CESAREAN SECTION: Primary | ICD-10-CM

## 2022-12-22 DIAGNOSIS — Z98.891 HISTORY OF CESAREAN DELIVERY: ICD-10-CM

## 2022-12-22 PROBLEM — Z98.51 HISTORY OF BILATERAL TUBAL LIGATION: Status: ACTIVE | Noted: 2022-12-22

## 2022-12-22 LAB
ABO + RH BLD: NORMAL
BASOPHILS # BLD AUTO: 0.02 K/UL (ref 0–0.2)
BASOPHILS NFR BLD: 0.3 % (ref 0–1.9)
BLD GP AB SCN CELLS X3 SERPL QL: NORMAL
DIFFERENTIAL METHOD: ABNORMAL
EOSINOPHIL # BLD AUTO: 0.1 K/UL (ref 0–0.5)
EOSINOPHIL NFR BLD: 2.2 % (ref 0–8)
ERYTHROCYTE [DISTWIDTH] IN BLOOD BY AUTOMATED COUNT: 16.8 % (ref 11.5–14.5)
HCT VFR BLD AUTO: 31 % (ref 37–48.5)
HGB BLD-MCNC: 9.8 G/DL (ref 12–16)
IMM GRANULOCYTES # BLD AUTO: 0.02 K/UL (ref 0–0.04)
IMM GRANULOCYTES NFR BLD AUTO: 0.3 % (ref 0–0.5)
LYMPHOCYTES # BLD AUTO: 1.6 K/UL (ref 1–4.8)
LYMPHOCYTES NFR BLD: 25 % (ref 18–48)
MCH RBC QN AUTO: 23.7 PG (ref 27–31)
MCHC RBC AUTO-ENTMCNC: 31.6 G/DL (ref 32–36)
MCV RBC AUTO: 75 FL (ref 82–98)
MONOCYTES # BLD AUTO: 0.3 K/UL (ref 0.3–1)
MONOCYTES NFR BLD: 4.5 % (ref 4–15)
NEUTROPHILS # BLD AUTO: 4.3 K/UL (ref 1.8–7.7)
NEUTROPHILS NFR BLD: 67.7 % (ref 38–73)
NRBC BLD-RTO: 0 /100 WBC
PLATELET # BLD AUTO: 174 K/UL (ref 150–450)
PMV BLD AUTO: 10.8 FL (ref 9.2–12.9)
RBC # BLD AUTO: 4.14 M/UL (ref 4–5.4)
WBC # BLD AUTO: 6.4 K/UL (ref 3.9–12.7)

## 2022-12-22 PROCEDURE — 36004724 HC OB OR TIME LEV III - 1ST 15 MIN: Performed by: OBSTETRICS & GYNECOLOGY

## 2022-12-22 PROCEDURE — 25000003 PHARM REV CODE 250: Performed by: STUDENT IN AN ORGANIZED HEALTH CARE EDUCATION/TRAINING PROGRAM

## 2022-12-22 PROCEDURE — C1751 CATH, INF, PER/CENT/MIDLINE: HCPCS | Performed by: ANESTHESIOLOGY

## 2022-12-22 PROCEDURE — 27200688 HC TRAY, SPINAL-HYPER/ ISOBARIC: Performed by: ANESTHESIOLOGY

## 2022-12-22 PROCEDURE — 59510 PR FULL ROUT OBSTE CARE,CESAREAN DELIV: ICD-10-PCS | Mod: ,,, | Performed by: OBSTETRICS & GYNECOLOGY

## 2022-12-22 PROCEDURE — 25000003 PHARM REV CODE 250: Performed by: OBSTETRICS & GYNECOLOGY

## 2022-12-22 PROCEDURE — 63600175 PHARM REV CODE 636 W HCPCS: Performed by: STUDENT IN AN ORGANIZED HEALTH CARE EDUCATION/TRAINING PROGRAM

## 2022-12-22 PROCEDURE — 58611 PR LIGATION,FALLOPIAN TUBE W/C-SECTION: ICD-10-PCS | Mod: ,,, | Performed by: OBSTETRICS & GYNECOLOGY

## 2022-12-22 PROCEDURE — 88302 TISSUE EXAM BY PATHOLOGIST: CPT | Mod: 59 | Performed by: PATHOLOGY

## 2022-12-22 PROCEDURE — 36004725 HC OB OR TIME LEV III - EA ADD 15 MIN: Performed by: OBSTETRICS & GYNECOLOGY

## 2022-12-22 PROCEDURE — 51798 US URINE CAPACITY MEASURE: CPT

## 2022-12-22 PROCEDURE — 59510 CESAREAN DELIVERY: CPT | Mod: ,,, | Performed by: OBSTETRICS & GYNECOLOGY

## 2022-12-22 PROCEDURE — 88302 PR  SURG PATH,LEVEL II: ICD-10-PCS | Mod: 26,,, | Performed by: PATHOLOGY

## 2022-12-22 PROCEDURE — 11000001 HC ACUTE MED/SURG PRIVATE ROOM

## 2022-12-22 PROCEDURE — 37000008 HC ANESTHESIA 1ST 15 MINUTES: Performed by: OBSTETRICS & GYNECOLOGY

## 2022-12-22 PROCEDURE — 71000033 HC RECOVERY, INTIAL HOUR: Performed by: OBSTETRICS & GYNECOLOGY

## 2022-12-22 PROCEDURE — 25000003 PHARM REV CODE 250: Performed by: ANESTHESIOLOGY

## 2022-12-22 PROCEDURE — 59514 CESAREAN DELIVERY ONLY: CPT | Mod: QY,,, | Performed by: ANESTHESIOLOGY

## 2022-12-22 PROCEDURE — 51702 INSERT TEMP BLADDER CATH: CPT

## 2022-12-22 PROCEDURE — 88302 TISSUE EXAM BY PATHOLOGIST: CPT | Mod: 26,,, | Performed by: PATHOLOGY

## 2022-12-22 PROCEDURE — 85025 COMPLETE CBC W/AUTO DIFF WBC: CPT | Performed by: OBSTETRICS & GYNECOLOGY

## 2022-12-22 PROCEDURE — 86901 BLOOD TYPING SEROLOGIC RH(D): CPT | Performed by: OBSTETRICS & GYNECOLOGY

## 2022-12-22 PROCEDURE — 71000039 HC RECOVERY, EACH ADD'L HOUR: Performed by: OBSTETRICS & GYNECOLOGY

## 2022-12-22 PROCEDURE — 59514 PRA REAN DELIVERY ONLY: ICD-10-PCS | Mod: QY,,, | Performed by: ANESTHESIOLOGY

## 2022-12-22 PROCEDURE — 37000009 HC ANESTHESIA EA ADD 15 MINS: Performed by: OBSTETRICS & GYNECOLOGY

## 2022-12-22 PROCEDURE — 51701 INSERT BLADDER CATHETER: CPT

## 2022-12-22 PROCEDURE — 58611 LIGATE OVIDUCT(S) ADD-ON: CPT | Mod: ,,, | Performed by: OBSTETRICS & GYNECOLOGY

## 2022-12-22 RX ORDER — OXYCODONE HYDROCHLORIDE 5 MG/1
5 TABLET ORAL EVERY 4 HOURS PRN
Status: CANCELLED | OUTPATIENT
Start: 2022-12-22

## 2022-12-22 RX ORDER — KETOROLAC TROMETHAMINE 30 MG/ML
30 INJECTION, SOLUTION INTRAMUSCULAR; INTRAVENOUS EVERY 6 HOURS
Status: DISCONTINUED | OUTPATIENT
Start: 2022-12-22 | End: 2022-12-22

## 2022-12-22 RX ORDER — ONDANSETRON 2 MG/ML
4 INJECTION INTRAMUSCULAR; INTRAVENOUS EVERY 6 HOURS PRN
Status: DISCONTINUED | OUTPATIENT
Start: 2022-12-22 | End: 2022-12-22

## 2022-12-22 RX ORDER — DIPHENHYDRAMINE HYDROCHLORIDE 50 MG/ML
12.5 INJECTION INTRAMUSCULAR; INTRAVENOUS
Status: DISCONTINUED | OUTPATIENT
Start: 2022-12-22 | End: 2022-12-24 | Stop reason: HOSPADM

## 2022-12-22 RX ORDER — ONDANSETRON HYDROCHLORIDE 2 MG/ML
INJECTION, SOLUTION INTRAMUSCULAR; INTRAVENOUS
Status: DISCONTINUED | OUTPATIENT
Start: 2022-12-22 | End: 2022-12-22

## 2022-12-22 RX ORDER — AMOXICILLIN 250 MG
1 CAPSULE ORAL NIGHTLY PRN
Status: CANCELLED | OUTPATIENT
Start: 2022-12-22

## 2022-12-22 RX ORDER — HYDROCORTISONE 25 MG/G
CREAM TOPICAL 3 TIMES DAILY PRN
Status: CANCELLED | OUTPATIENT
Start: 2022-12-22

## 2022-12-22 RX ORDER — VALACYCLOVIR HYDROCHLORIDE 500 MG/1
500 TABLET, FILM COATED ORAL DAILY
Status: DISCONTINUED | OUTPATIENT
Start: 2022-12-22 | End: 2022-12-24 | Stop reason: HOSPADM

## 2022-12-22 RX ORDER — METHYLERGONOVINE MALEATE 0.2 MG/ML
200 INJECTION INTRAVENOUS ONCE AS NEEDED
Status: DISCONTINUED | OUTPATIENT
Start: 2022-12-22 | End: 2022-12-24 | Stop reason: HOSPADM

## 2022-12-22 RX ORDER — SODIUM CHLORIDE, SODIUM LACTATE, POTASSIUM CHLORIDE, CALCIUM CHLORIDE 600; 310; 30; 20 MG/100ML; MG/100ML; MG/100ML; MG/100ML
INJECTION, SOLUTION INTRAVENOUS CONTINUOUS PRN
Status: DISCONTINUED | OUTPATIENT
Start: 2022-12-22 | End: 2022-12-22

## 2022-12-22 RX ORDER — FENTANYL CITRATE 50 UG/ML
INJECTION, SOLUTION INTRAMUSCULAR; INTRAVENOUS
Status: DISCONTINUED | OUTPATIENT
Start: 2022-12-22 | End: 2022-12-22

## 2022-12-22 RX ORDER — ONDANSETRON 2 MG/ML
4 INJECTION INTRAMUSCULAR; INTRAVENOUS EVERY 6 HOURS PRN
Status: DISCONTINUED | OUTPATIENT
Start: 2022-12-22 | End: 2022-12-24 | Stop reason: HOSPADM

## 2022-12-22 RX ORDER — DOCUSATE SODIUM 100 MG/1
200 CAPSULE, LIQUID FILLED ORAL 2 TIMES DAILY
Status: CANCELLED | OUTPATIENT
Start: 2022-12-22

## 2022-12-22 RX ORDER — DOCUSATE SODIUM 100 MG/1
200 CAPSULE, LIQUID FILLED ORAL 2 TIMES DAILY
Status: DISCONTINUED | OUTPATIENT
Start: 2022-12-22 | End: 2022-12-24 | Stop reason: HOSPADM

## 2022-12-22 RX ORDER — EPHEDRINE SULFATE 50 MG/ML
INJECTION, SOLUTION INTRAVENOUS
Status: DISCONTINUED | OUTPATIENT
Start: 2022-12-22 | End: 2022-12-22

## 2022-12-22 RX ORDER — PRENATAL WITH FERROUS FUM AND FOLIC ACID 3080; 920; 120; 400; 22; 1.84; 3; 20; 10; 1; 12; 200; 27; 25; 2 [IU]/1; [IU]/1; MG/1; [IU]/1; MG/1; MG/1; MG/1; MG/1; MG/1; MG/1; UG/1; MG/1; MG/1; MG/1; MG/1
1 TABLET ORAL DAILY
Status: CANCELLED | OUTPATIENT
Start: 2022-12-22

## 2022-12-22 RX ORDER — ACETAMINOPHEN 325 MG/1
650 TABLET ORAL
Status: DISCONTINUED | OUTPATIENT
Start: 2022-12-22 | End: 2022-12-24 | Stop reason: HOSPADM

## 2022-12-22 RX ORDER — OXYCODONE HYDROCHLORIDE 5 MG/1
10 TABLET ORAL EVERY 4 HOURS PRN
Status: DISPENSED | OUTPATIENT
Start: 2022-12-22 | End: 2022-12-23

## 2022-12-22 RX ORDER — SIMETHICONE 80 MG
1 TABLET,CHEWABLE ORAL EVERY 6 HOURS PRN
Status: CANCELLED | OUTPATIENT
Start: 2022-12-22

## 2022-12-22 RX ORDER — PROCHLORPERAZINE EDISYLATE 5 MG/ML
5 INJECTION INTRAMUSCULAR; INTRAVENOUS EVERY 6 HOURS PRN
Status: DISCONTINUED | OUTPATIENT
Start: 2022-12-22 | End: 2022-12-22

## 2022-12-22 RX ORDER — OXYCODONE HYDROCHLORIDE 5 MG/1
10 TABLET ORAL EVERY 4 HOURS PRN
Status: CANCELLED | OUTPATIENT
Start: 2022-12-22

## 2022-12-22 RX ORDER — DIPHENHYDRAMINE HCL 25 MG
25 CAPSULE ORAL EVERY 6 HOURS PRN
Status: DISCONTINUED | OUTPATIENT
Start: 2022-12-22 | End: 2022-12-24 | Stop reason: HOSPADM

## 2022-12-22 RX ORDER — BUPIVACAINE HYDROCHLORIDE 7.5 MG/ML
INJECTION, SOLUTION EPIDURAL; RETROBULBAR
Status: DISCONTINUED | OUTPATIENT
Start: 2022-12-22 | End: 2022-12-22

## 2022-12-22 RX ORDER — TRANEXAMIC ACID 100 MG/ML
1000 INJECTION, SOLUTION INTRAVENOUS ONCE AS NEEDED
Status: DISCONTINUED | OUTPATIENT
Start: 2022-12-22 | End: 2022-12-24 | Stop reason: HOSPADM

## 2022-12-22 RX ORDER — CEFAZOLIN SODIUM 2 G/50ML
2 SOLUTION INTRAVENOUS
Status: DISCONTINUED | OUTPATIENT
Start: 2022-12-22 | End: 2022-12-24 | Stop reason: HOSPADM

## 2022-12-22 RX ORDER — MISOPROSTOL 200 UG/1
800 TABLET ORAL ONCE AS NEEDED
Status: DISCONTINUED | OUTPATIENT
Start: 2022-12-22 | End: 2022-12-22

## 2022-12-22 RX ORDER — SIMETHICONE 80 MG
2 TABLET,CHEWABLE ORAL 3 TIMES DAILY
Status: DISCONTINUED | OUTPATIENT
Start: 2022-12-22 | End: 2022-12-24 | Stop reason: HOSPADM

## 2022-12-22 RX ORDER — PHENYLEPHRINE HYDROCHLORIDE 10 MG/ML
INJECTION INTRAVENOUS
Status: DISCONTINUED | OUTPATIENT
Start: 2022-12-22 | End: 2022-12-22

## 2022-12-22 RX ORDER — DOCUSATE SODIUM 100 MG/1
100 CAPSULE, LIQUID FILLED ORAL 2 TIMES DAILY
Qty: 60 CAPSULE | Refills: 0 | Status: SHIPPED | OUTPATIENT
Start: 2022-12-22 | End: 2023-03-14

## 2022-12-22 RX ORDER — NALBUPHINE HYDROCHLORIDE 10 MG/ML
5 INJECTION, SOLUTION INTRAMUSCULAR; INTRAVENOUS; SUBCUTANEOUS ONCE AS NEEDED
Status: DISCONTINUED | OUTPATIENT
Start: 2022-12-22 | End: 2022-12-24 | Stop reason: HOSPADM

## 2022-12-22 RX ORDER — ACETAMINOPHEN 500 MG
1000 TABLET ORAL
Status: COMPLETED | OUTPATIENT
Start: 2022-12-22 | End: 2022-12-22

## 2022-12-22 RX ORDER — MORPHINE SULFATE 0.5 MG/ML
INJECTION, SOLUTION EPIDURAL; INTRATHECAL; INTRAVENOUS
Status: DISCONTINUED | OUTPATIENT
Start: 2022-12-22 | End: 2022-12-22

## 2022-12-22 RX ORDER — MISOPROSTOL 200 UG/1
800 TABLET ORAL ONCE AS NEEDED
Status: DISCONTINUED | OUTPATIENT
Start: 2022-12-22 | End: 2022-12-24 | Stop reason: HOSPADM

## 2022-12-22 RX ORDER — OXYCODONE HYDROCHLORIDE 5 MG/1
5 TABLET ORAL EVERY 4 HOURS PRN
Status: ACTIVE | OUTPATIENT
Start: 2022-12-22 | End: 2022-12-23

## 2022-12-22 RX ORDER — IBUPROFEN 400 MG/1
800 TABLET ORAL
Status: DISCONTINUED | OUTPATIENT
Start: 2022-12-23 | End: 2022-12-24 | Stop reason: HOSPADM

## 2022-12-22 RX ORDER — SODIUM CHLORIDE, SODIUM LACTATE, POTASSIUM CHLORIDE, CALCIUM CHLORIDE 600; 310; 30; 20 MG/100ML; MG/100ML; MG/100ML; MG/100ML
INJECTION, SOLUTION INTRAVENOUS CONTINUOUS
Status: DISCONTINUED | OUTPATIENT
Start: 2022-12-22 | End: 2022-12-22

## 2022-12-22 RX ORDER — ONDANSETRON 8 MG/1
8 TABLET, ORALLY DISINTEGRATING ORAL EVERY 8 HOURS PRN
Status: DISCONTINUED | OUTPATIENT
Start: 2022-12-22 | End: 2022-12-24 | Stop reason: HOSPADM

## 2022-12-22 RX ORDER — PROCHLORPERAZINE EDISYLATE 5 MG/ML
5 INJECTION INTRAMUSCULAR; INTRAVENOUS EVERY 6 HOURS PRN
Status: DISCONTINUED | OUTPATIENT
Start: 2022-12-22 | End: 2022-12-24 | Stop reason: HOSPADM

## 2022-12-22 RX ORDER — MIDAZOLAM HYDROCHLORIDE 1 MG/ML
INJECTION INTRAMUSCULAR; INTRAVENOUS
Status: DISCONTINUED | OUTPATIENT
Start: 2022-12-22 | End: 2022-12-22

## 2022-12-22 RX ORDER — OXYCODONE HYDROCHLORIDE 5 MG/1
5 TABLET ORAL EVERY 6 HOURS PRN
Qty: 28 TABLET | Refills: 0 | Status: SHIPPED | OUTPATIENT
Start: 2022-12-22 | End: 2023-03-14

## 2022-12-22 RX ORDER — KETOROLAC TROMETHAMINE 30 MG/ML
30 INJECTION, SOLUTION INTRAMUSCULAR; INTRAVENOUS
Status: COMPLETED | OUTPATIENT
Start: 2022-12-22 | End: 2022-12-23

## 2022-12-22 RX ORDER — DEXAMETHASONE SODIUM PHOSPHATE 4 MG/ML
INJECTION, SOLUTION INTRA-ARTICULAR; INTRALESIONAL; INTRAMUSCULAR; INTRAVENOUS; SOFT TISSUE
Status: DISCONTINUED | OUTPATIENT
Start: 2022-12-22 | End: 2022-12-22

## 2022-12-22 RX ORDER — IBUPROFEN 800 MG/1
800 TABLET ORAL EVERY 8 HOURS
Qty: 60 TABLET | Refills: 1 | Status: SHIPPED | OUTPATIENT
Start: 2022-12-23 | End: 2023-03-14

## 2022-12-22 RX ORDER — FAMOTIDINE 10 MG/ML
20 INJECTION INTRAVENOUS
Status: COMPLETED | OUTPATIENT
Start: 2022-12-22 | End: 2022-12-22

## 2022-12-22 RX ORDER — CLONIDINE 100 UG/ML
INJECTION, SOLUTION EPIDURAL
Status: DISCONTINUED | OUTPATIENT
Start: 2022-12-22 | End: 2022-12-22

## 2022-12-22 RX ORDER — SODIUM CHLORIDE, SODIUM LACTATE, POTASSIUM CHLORIDE, CALCIUM CHLORIDE 600; 310; 30; 20 MG/100ML; MG/100ML; MG/100ML; MG/100ML
INJECTION, SOLUTION INTRAVENOUS CONTINUOUS
Status: DISCONTINUED | OUTPATIENT
Start: 2022-12-22 | End: 2022-12-24 | Stop reason: HOSPADM

## 2022-12-22 RX ORDER — CARBOPROST TROMETHAMINE 250 UG/ML
250 INJECTION, SOLUTION INTRAMUSCULAR
Status: DISCONTINUED | OUTPATIENT
Start: 2022-12-22 | End: 2022-12-24 | Stop reason: HOSPADM

## 2022-12-22 RX ORDER — OXYTOCIN/RINGER'S LACTATE 30/500 ML
95 PLASTIC BAG, INJECTION (ML) INTRAVENOUS CONTINUOUS
Status: DISCONTINUED | OUTPATIENT
Start: 2022-12-22 | End: 2022-12-24 | Stop reason: HOSPADM

## 2022-12-22 RX ORDER — ACETAMINOPHEN 325 MG/1
650 TABLET ORAL
Status: DISCONTINUED | OUTPATIENT
Start: 2022-12-22 | End: 2022-12-22

## 2022-12-22 RX ORDER — SODIUM CITRATE AND CITRIC ACID MONOHYDRATE 334; 500 MG/5ML; MG/5ML
30 SOLUTION ORAL
Status: COMPLETED | OUTPATIENT
Start: 2022-12-22 | End: 2022-12-22

## 2022-12-22 RX ADMIN — BUPIVACAINE HYDROCHLORIDE 1.6 ML: 7.5 INJECTION, SOLUTION EPIDURAL; RETROBULBAR at 07:12

## 2022-12-22 RX ADMIN — FAMOTIDINE 20 MG: 10 INJECTION, SOLUTION INTRAVENOUS at 06:12

## 2022-12-22 RX ADMIN — Medication 50 ML: at 07:12

## 2022-12-22 RX ADMIN — CLONIDINE HYDROCHLORIDE 50 MCG: 100 INJECTION, SOLUTION INTRAVENOUS at 07:12

## 2022-12-22 RX ADMIN — ACETAMINOPHEN 1000 MG: 500 TABLET ORAL at 06:12

## 2022-12-22 RX ADMIN — PHENYLEPHRINE HYDROCHLORIDE 15 MCG/KG/MIN: 10 INJECTION INTRAVENOUS at 07:12

## 2022-12-22 RX ADMIN — ONDANSETRON 4 MG: 2 INJECTION INTRAMUSCULAR; INTRAVENOUS at 05:12

## 2022-12-22 RX ADMIN — ACETAMINOPHEN 650 MG: 325 TABLET, FILM COATED ORAL at 03:12

## 2022-12-22 RX ADMIN — SIMETHICONE 160 MG: 80 TABLET, CHEWABLE ORAL at 10:12

## 2022-12-22 RX ADMIN — KETOROLAC TROMETHAMINE 30 MG: 30 INJECTION, SOLUTION INTRAMUSCULAR; INTRAVENOUS at 09:12

## 2022-12-22 RX ADMIN — OXYCODONE 10 MG: 5 TABLET ORAL at 10:12

## 2022-12-22 RX ADMIN — MIDAZOLAM HYDROCHLORIDE 1 MG: 1 INJECTION, SOLUTION INTRAMUSCULAR; INTRAVENOUS at 07:12

## 2022-12-22 RX ADMIN — ONDANSETRON 4 MG: 2 INJECTION INTRAMUSCULAR; INTRAVENOUS at 11:12

## 2022-12-22 RX ADMIN — KETOROLAC TROMETHAMINE 30 MG: 30 INJECTION, SOLUTION INTRAMUSCULAR; INTRAVENOUS at 08:12

## 2022-12-22 RX ADMIN — OXYCODONE 10 MG: 5 TABLET ORAL at 11:12

## 2022-12-22 RX ADMIN — DEXAMETHASONE SODIUM PHOSPHATE 4 MG: 4 INJECTION INTRA-ARTICULAR; INTRALESIONAL; INTRAMUSCULAR; INTRAVENOUS; SOFT TISSUE at 07:12

## 2022-12-22 RX ADMIN — SIMETHICONE 160 MG: 80 TABLET, CHEWABLE ORAL at 03:12

## 2022-12-22 RX ADMIN — EPHEDRINE SULFATE 50 MG: 50 INJECTION INTRAVENOUS at 10:12

## 2022-12-22 RX ADMIN — PHENYLEPHRINE HYDROCHLORIDE 150 MCG: 10 INJECTION INTRAVENOUS at 07:12

## 2022-12-22 RX ADMIN — ONDANSETRON 4 MG: 2 INJECTION INTRAMUSCULAR; INTRAVENOUS at 07:12

## 2022-12-22 RX ADMIN — ACETAMINOPHEN 650 MG: 325 TABLET, FILM COATED ORAL at 08:12

## 2022-12-22 RX ADMIN — OXYCODONE 10 MG: 5 TABLET ORAL at 03:12

## 2022-12-22 RX ADMIN — SODIUM CITRATE AND CITRIC ACID MONOHYDRATE 30 ML: 500; 334 SOLUTION ORAL at 06:12

## 2022-12-22 RX ADMIN — KETOROLAC TROMETHAMINE 30 MG: 30 INJECTION, SOLUTION INTRAMUSCULAR; INTRAVENOUS at 03:12

## 2022-12-22 RX ADMIN — Medication 0.1 MG: at 07:12

## 2022-12-22 RX ADMIN — DEXTROSE 2 G: 50 INJECTION, SOLUTION INTRAVENOUS at 07:12

## 2022-12-22 RX ADMIN — FENTANYL CITRATE 10 MCG: 0.05 INJECTION, SOLUTION INTRAMUSCULAR; INTRAVENOUS at 07:12

## 2022-12-22 RX ADMIN — Medication 95 MILLI-UNITS/MIN: at 09:12

## 2022-12-22 RX ADMIN — SODIUM CHLORIDE, SODIUM LACTATE, POTASSIUM CHLORIDE, AND CALCIUM CHLORIDE: 600; 310; 30; 20 INJECTION, SOLUTION INTRAVENOUS at 07:12

## 2022-12-22 RX ADMIN — PHENYLEPHRINE HYDROCHLORIDE 100 MCG: 10 INJECTION INTRAVENOUS at 07:12

## 2022-12-22 RX ADMIN — DOCUSATE SODIUM 200 MG: 100 CAPSULE, LIQUID FILLED ORAL at 08:12

## 2022-12-22 NOTE — ANESTHESIA PREPROCEDURE EVALUATION
[facility]  Anesthesia Pre-Operative Evaluation         Patient Name: Leydi Matias  YOB: 1990  MRN: [id number]    2022      Leydi Matias is a 32 y.o. female  @ 39w0d who presents for repeat LTCS and BTL. This IUP is complicated by prev c/s x 2. H/o HSV on prophy valtrex. Denies cardiopulmonary, bleeding, or spine disorders. No previous back surgeries or anticoagulant use. No problems with past neuraxial anesthesia.      OB History    Para Term  AB Living   4 2 2 0 1 2   SAB IAB Ectopic Multiple Live Births   1 0 0 0 2      # Outcome Date GA Lbr Rocco/2nd Weight Sex Delivery Anes PTL Lv   4 Current            3 SAB               Complications: Biochemical pregnancy   2 Term 18 39w0d  3.827 kg (8 lb 7 oz) M CS-LTranv Spinal N DEBRA   1 Term 17 39w0d  3.56 kg (7 lb 13.6 oz) F CS-LTranv Spinal N DEBRA      Complications: Breech presentation       Review of patient's allergies indicates:  No Known Allergies    Wt Readings from Last 1 Encounters:   22 1054 105 kg (231 lb 7.7 oz)       BP Readings from Last 3 Encounters:   22 134/74   22 130/72   22 122/70       Patient Active Problem List   Diagnosis    Herpes simplex infection of genitourinary system     delivery delivered     delivery, delivered, current hospitalization    History of  delivery       Past Surgical History:   Procedure Laterality Date    BREAST SURGERY      AUGMENTATION     SECTION N/A 2018    Procedure:  SECTION;  Surgeon: Robert Shields MD;  Location: [facility] [place] L&D;  Service: OB/GYN;  Laterality: N/A;     SECTION  2017, 18    BREECH, female    TONSILLECTOMY         Social History     Socioeconomic History    Marital status:    Tobacco Use    Smoking status: Never    Smokeless tobacco: Never   Substance and Sexual Activity    Alcohol use: No     Alcohol/week: 0.0 standard  drinks    Drug use: No    Sexual activity: Not Currently     Partners: Male     Birth control/protection: None     Comment: :  Ha         Chemistry        Component Value Date/Time     11/05/2018 1511    K 3.5 11/05/2018 1511     11/05/2018 1511    CO2 23 11/05/2018 1511    BUN 9 11/05/2018 1511    CREATININE 0.7 11/05/2018 1511    GLU 72 11/05/2018 1511        Component Value Date/Time    CALCIUM 8.6 (L) 11/05/2018 1511    ALKPHOS 129 11/05/2018 1511    AST 22 11/05/2018 1511    ALT 13 11/05/2018 1511    BILITOT 0.3 11/05/2018 1511    ESTGFRAFRICA >60.0 11/05/2018 1511    EGFRNONAA >60.0 11/05/2018 1511            Lab Results   Component Value Date    WBC 6.26 10/10/2022    HGB 10.0 (L) 10/10/2022    HCT 32.7 (L) 10/10/2022    MCV 81 (L) 10/10/2022     10/10/2022       No results for input(s): PT, INR, PROTIME, APTT in the last 72 hours.                                                                                                                 12/22/2022  Leydi Matias is a 32 y.o., female.      Pre-op Assessment    I have reviewed the Patient Summary Reports.     I have reviewed the Nursing Notes. I have reviewed the NPO Status.   I have reviewed the Medications.     Review of Systems  Anesthesia Hx:  No previous Anesthesia Denies Hx of Anesthetic complications  History of prior surgery of interest to airway management or planning: Denies Family Hx of Anesthesia complications.   Denies Personal Hx of Anesthesia complications.   Cardiovascular:   Exercise tolerance: good Denies Hypertension.  Denies CAD.    Denies CABG/stent.   Denies CHF. no hyperlipidemia    Pulmonary:   Denies COPD.  Denies Asthma.  Denies Sleep Apnea.    Renal/:   Denies Chronic Renal Disease.     Hepatic/GI:   Denies GERD.    Neurological:   Denies CVA.  Denies Headaches. Denies Seizures.    Endocrine:   Denies Diabetes.  Obesity / BMI > 30  Psych:   Denies Psychiatric History.          Physical  Exam  General: Well nourished, Cooperative, Alert and Oriented    Airway:  Mallampati: II / II  Mouth Opening: Normal  TM Distance: Normal  Tongue: Normal  Neck ROM: Normal ROM    Dental:  Intact        Anesthesia Plan  Type of Anesthesia, risks & benefits discussed:    Anesthesia Type: Epidural, Spinal, CSE, Regional, Gen ETT  Intra-op Monitoring Plan: Standard ASA Monitors  Post Op Pain Control Plan: multimodal analgesia, IV/PO Opioids PRN, epidural analgesia and intrathecal opioid  Induction:  IV  Airway Plan: Video and Direct, Post-Induction  Informed Consent: Informed consent signed with the Patient and all parties understand the risks and agree with anesthesia plan.  All questions answered. Patient consented to blood products? Yes  ASA Score: 3  Day of Surgery Review of History & Physical: H&P Update referred to the surgeon/provider.    Ready For Surgery From Anesthesia Perspective.     .

## 2022-12-22 NOTE — ANESTHESIA PROCEDURE NOTES
CSE    Patient location during procedure: OR  Start time: 12/22/2022 7:17 AM  Timeout: 12/22/2022 7:16 AM  End time: 12/22/2022 7:30 AM      Staffing  Authorizing Provider: Bryan Tam MD  Performing Provider: Bryan Tam MD    Preanesthetic Checklist  Completed: patient identified, IV checked, site marked, risks and benefits discussed, surgical consent, monitors and equipment checked, pre-op evaluation and timeout performed  CSE  Patient position: sitting  Prep: ChloraPrep  Patient monitoring: heart rate, continuous pulse ox and frequent blood pressure checks  Approach: midline  Spinal Needle  Needle type: pencil-tip   Needle gauge: 25 G  Needle length: 5 in  Epidural Needle  Injection technique: DELROY air  Needle type: Tuohy   Needle gauge: 17 G  Needle length: 3.5 in  Needle insertion depth: 7 cm  Location: L4-5  Needle localization: anatomical landmarks   Catheter  Catheter type: springwound  Catheter size: 20 G  Catheter at skin depth: 11 cm  Additional Documentation: incremental injection, negative aspiration for heme and no paresthesia on injection  Assessment  Sensory level: T4   Dermatomal levels determined by pinch or prick  Intrathecal Medications:   administered: primary anesthetic mcg of    Additional Notes  Moderately difficult, patient didn't have great lumbar landmarks and lots of bony areas. Spaces weren't necessarily tight, but I would think there is at least some degree of deviation

## 2022-12-22 NOTE — TRANSFER OF CARE
Anesthesia Transfer of Care Note    Patient: Leydi Matias    Procedure(s) Performed: Procedure(s) (LRB):   SECTION, WITH TUBAL LIGATION (N/A)    Patient location: PACU    Anesthesia Type: CSE    Transport from OR: Transported from OR on room air with adequate spontaneous ventilation    Post pain: adequate analgesia    Post assessment: no apparent anesthetic complications and tolerated procedure well    Post vital signs: stable    Level of consciousness: awake and alert    Nausea/Vomiting: no nausea/vomiting    Complications: none    Transfer of care protocol was followed      Last vitals:   Visit Vitals  BP (!) 101/52   Pulse 85   Temp 36.4 °C (97.6 °F)   Resp 16   LMP  (LMP Unknown)   SpO2 100%   Breastfeeding No

## 2022-12-22 NOTE — INTERVAL H&P NOTE
Leydi Matias is 32 y.o.  at 39w0d wga presenting for rLTCS and BTL.     FHT: 130 bpm, moderate variability, +accels, -decels; Cat 1 (reassuring)  [place]: irregular  Presentation: cephalic by ultrasound    SVE: Deferred     1) rLTCS  - Plan for repeat  section     2) Undesired Fertility  - BTL consents signed     3) HSV   - Will perform speculum exam in OR     Contraception: BTL       Active Hospital Problems    Diagnosis  POA    *History of  delivery [Z98.891]  Not Applicable    Status post  section [Z98.891]  Not Applicable    History of bilateral tubal ligation [Z98.51]  Not Applicable    Herpes simplex infection of genitourinary system [A60.00]  Yes      Resolved Hospital Problems   No resolved problems to display.

## 2022-12-22 NOTE — PROGRESS NOTES
Baby Led Bottle Feeding education   Wash your hands.  Feed Baby on cue, not on a schedule. Babies give cues when ready to feed. Cues are soft sounds like grunts, moving arms and leg, licking lips, turning head to the side with an open mouth, and sucking hands/fingers.  Hold baby skin to skin during feedings. Look into babys eyes, talk to baby, and stroke baby while baby suckles.  Baby should be fed 8 or more times a day depending on babys cues. Some babies may be sleepy and may need to be awakened for their feeds to get the 8 feeds a day needed.  Hold the baby close while feeding and never prop a bottle.  Hold baby upright supporting head and neck.  Place the tip of nipple below babys nose, rubbing top lip and allow baby to open mouth and accept the nipple.  Hold the bottle horizontally, (level with the ground), tilt the bottle just enough to get milk in the nipple.  Watch for stress cues during feeding. Be alert for baby wrinkling eyebrow, baby turning head away from bottle, or getting fussy. Baby may need a break.  Once baby releases nipple or pulls away, do not force baby to finish bottle. Baby is full when sucks slow or stops, arms relax, turns away from nipple, pushes away or falls asleep.  Pace the feeding, feed slowly so that baby is given 15-20 minutes with breaks to burp every 10-15mls.  Alternate arms part way through feeding to allow stimulation to both babys eyes.  Use formula within one hour of starting feeding. Throw away left over formula.    Mother able to demonstrate baby led bottlefeeding

## 2022-12-22 NOTE — PROGRESS NOTES
[employer] for [employer] pump, tubing, collections containers and labels brought to bedside.  Discussed proper pump setting of initiation phase.  Instructed on proper usage of pump and to adjust suction according to maximum comfort level.  Verified appropriate flange fit.  Educated on the frequency and duration of pumping in order to promote and maintain a full milk supply.  Hands on pumping technique reviewed.  Encouraged hand expression after pumping.  Instructed on cleaning of breast pump parts.  Written instructions also given.  Pt verbalized understanding and appropriate recall for proper milk handling, collection, labeling, storage and transportation.

## 2022-12-22 NOTE — L&D DELIVERY NOTE
[facility] - Labor & Delivery   Section   Operative Note    SUMMARY    Section Procedure Note    Procedure Date: 2022    Procedure:   1. Repeat  Section via pfannenstiel skin incision  2. Bilateral tubal ligation - modified [place]     Indications:   1. previous uterine incision low transverse    Pre-operative Diagnosis:   1. IUP at 39w0d pregnancy  2. Undesired fertility  3. HSV (negative speculum exam)   4. History of c/s x2    Post-operative Diagnosis:   2-3. Same as above.   4. Status post rLTCS and BTL     Surgeon: Robert Shields MD      Assistants: Kassandra Brown MD-PGY-2    Anesthesia: Spinal and Epidural Anesthesia    Findings:    1. Single viable  female infant, with APGARS 8/9, weight 3780g.   2. Normal appearing uterus, ovaries, and fallopian tubes.  3. Normal placenta.   4. Excellent hemostasis noted following closure of each tissue layer.    Estimated Blood Loss:  580 mL           Total IV Fluids: See anesthesia report.      UOP: 200 mL    Specimens:   Placenta, discarded.   Right and left fallopian tubes     PreOp CBC:   Lab Results   Component Value Date    WBC 6.40 2022    HGB 9.8 (L) 2022    HCT 31.0 (L) 2022    MCV 75 (L) 2022     2022                     Complications:  None; patient tolerated the procedure well.           Disposition: PACU - hemodynamically stable.           Condition: stable    Procedure Details:   The patient was seen in the Holding Room. The risks, benefits, complications, treatment options, and expected outcomes were discussed with the patient.  The patient concurred with the proposed plan, giving informed consent.  The site of surgery properly noted. The patient was taken to Operating Room, identified as Leydi Matias and the procedure verified as repeat  Delivery and bilateral tubal ligation. A Time Out was held and the above information confirmed.    After induction of anesthesia, the  patient was prepped and draped in the usual sterile manner while placed in a dorsal supine position with a left lateral tilt.  A speculum exam was performed and no evidence of HSV outbreak was noted. A rice catheter was also placed per nursing. Preoperative antibiotics, 2g ancef, were administered and an allis test was performed yielding adequate anesthesia.  A Pfannenstiel incision was made and carried down through the subcutaneous tissue to the fascia. Fascial incision was made and extended transversely. The fascia was grasped with [facility] clamps and  from the underlying rectus tissue superiorly and inferiorly. Sharp dissection using curved Funes to take down muscles and visualize peritoneum. The peritoneum was identified, found to be free of adherent bowel and entered bluntly. Peritoneal incision was extended longitudinally. The vesico-uterine peritoneum was identified and bladder blade was inserted. The vesico-uterine peritoneum was incised transversely and the bladder flap was bluntly freed from the lower uterine segment. The bladder blade was reinserted to keep the bladder out of the operative field. A low transverse uterine incision was made with knife and extended with finger fracture. The amniotic sac was ruptured with clear amniotic fluid and the infant was noted to be in vertex position. The vertex was brought to the incision and elevated out of the pelvis. The patient delivered a single viable female infant without difficulty.  Infant weighed 3780 grams with Apgar scores of 8/9 at one and five minutes respectively. After the umbilical cord was clamped and cut cord blood was obtained for evaluation. The placenta was removed intact and appeared normal and was discarded. The uterus was exteriorized. The uterine outline, tubes and ovaries appeared normal. The uterine incision was closed with running locked sutures of 0 PDS. Hemostasis was observed.     The right tube was identified followed out to  the fimbriated end.  The isthmic portion of the tube was grasped in an avascular portion with a kiera clamp and elevated with the kiera clamp and a 2 cm portion of fallopian tube was ligated with 2-0 plain gut using the parkland method. The tubal portion was then excised. The left tube was then identified followed out to the fimbriated end.  The isthmic portion of the tube was grasped in an avascular portion with a kiera clamp and elevated with the kiera clamp and a 2 cm portion of fallopian tube was ligated with 2-0 plain gut using the parkland method. The tubal portion was then excised. Hemostasis was verified. Both the right and left fallopian tube segments were sent to pathology.     The uterus was returned to the abdominal cavity. Incision was reinspected and good hemostasis was noted. The abdominal cavity was irrigated to remove clots. The ends of the fallopian tubes were visualized bilaterally, suture in place, hemostasis noted.  The peritoneum and muscle was reapproximated with 2-0 vicryl in running fashion. The fascia was then reapproximated with running sutures of 1 PDS . The subcutaneous fat and skin was reapproximated with 2-0 plain gut and 4-0 monocryl in running fashion respectively. .    Instrument, sponge, and needle counts were correct prior the abdominal closure and at the conclusion of the case.     Pt tolerated procedure well and was in stable condition after the procedure.    **NOTE: This patient IS NOT a candidate for trial of labor after  delivery**    Kassandra Brown MD/MPH  Ob/Gyn PGY-2  [facility]          Specimens:   Specimen (24h ago, onward)       Start     Ordered    22 0900  Specimen to Pathology, Surgery Gynecology and Obstetrics  Once        Comments: Pre-op Diagnosis: History of  delivery [Z98.891]Procedure(s): SECTION, WITH TUBAL LIGATION Number of specimens: 2Name of specimens: right and left fallopian tube     References:     Click here for ordering Quick Tip   Question Answer Comment   Procedure Type: Gynecology and Obstetrics    Specimen Class: Routine/Screening    Which provider would you like to cc? ROBERT ESTEBAN    Release to patient Immediate        22 0900                    Condition: Good    Disposition: PACU - hemodynamically stable.    Attestation: Good         Delivery Information for Yris Matias    Birth information:  YOB: 2024   Time of birth: 7:54 AM   Sex: female   Head Delivery Date/Time: 2022  7:54 AM   Delivery type: , Low Transverse   Gestational Age: 39w0d    Delivery Providers    Delivering clinician: Robert Esteban MD   Provider Role    MD Tania Chavez, RN     Lara Gibson               Measurements    Weight: 3780 g  Weight (lbs): 8 lb 5.3 oz  Length:          Apgars    Living status: Living  Apgars:  1 min.:  5 min.:  10 min.:  15 min.:  20 min.:    Skin color:  0  1       Heart rate:  2  2       Reflex irritability:  2  2       Muscle tone:  2  2       Respiratory effort:  2  2       Total:  8  9       Apgars assigned by: DEWAYNE RN         Operative Delivery    Forceps attempted?: No  Vacuum extractor attempted?: No         Shoulder Dystocia    Shoulder dystocia present?: No           Presentation    Presentation: Vertex  Position: Occiput           Interventions/Resuscitation    Method: Bulb Suctioning, Tactile Stimulation       Cord    Vessels: 3 vessels  Complications: Nuchal, Knot  Nuchal Intervention: reduced  Nuchal Cord Description: loose nuchal cord  Number of Loops: 1  Delayed Cord Clamping?: Yes  Cord Clamped Date/Time: 2022  7:55 AM  Cord Blood Disposition: Sent with Baby  Gases Sent?: No  Stem Cell Collection (by MD): No       Placenta    Placenta delivery date/time: 2022 0757  Placenta removal: Manual removal  Placenta appearance: Intact  Placenta disposition: discarded           Labor Events:       labor: No      Labor Onset Date/Time:         Dilation Complete Date/Time:         Start Pushing Date/Time:         Start Pushing Date/Time:       Rupture Date/Time: 22  0754         Rupture type:            Fluid Amount:         Fluid Color: Clear        Fluid Odor:         Membrane Status: ARM (Artificial Rupture)                 steroids: None     Antibiotics given for GBS: No     Induction:       Indications for induction:        Augmentation:       Indications for augmentation:       Labor complications: None     Additional complications:          Cervical ripening:                     Delivery:      Episiotomy:       Indication for Episiotomy:       Perineal Lacerations:   Repaired:      Periurethral Laceration:   Repaired:     Labial Laceration:   Repaired:     Sulcus Laceration:   Repaired:     Vaginal Laceration:   Repaired:     Cervical Laceration:   Repaired:     Repair suture:       Repair # of packets:       Last Value - EBL - Nursing (mL):       Sum - EBL - Nursing (mL): 0     Last Value - EBL - Anesthesia (mL):        Calculated QBL (mL): 580        Vaginal Sweep Performed: No     Surgicount Correct: Yes       Other providers:       Anesthesia    Method: Spinal, Epidural          Details (if applicable):  Trial of Labor No    Categorization: Repeat    Priority: Routine   Indications for : Repeat Section   Incision Type: low transverse     Additional  information:  Forceps:    Vacuum:    Breech:    Observed anomalies    Other (Comments):

## 2022-12-23 LAB
BASOPHILS # BLD AUTO: 0.02 K/UL (ref 0–0.2)
BASOPHILS NFR BLD: 0.3 % (ref 0–1.9)
DIFFERENTIAL METHOD: ABNORMAL
EOSINOPHIL # BLD AUTO: 0.1 K/UL (ref 0–0.5)
EOSINOPHIL NFR BLD: 1.5 % (ref 0–8)
ERYTHROCYTE [DISTWIDTH] IN BLOOD BY AUTOMATED COUNT: 17 % (ref 11.5–14.5)
HCT VFR BLD AUTO: 23.7 % (ref 37–48.5)
HGB BLD-MCNC: 7.3 G/DL (ref 12–16)
IMM GRANULOCYTES # BLD AUTO: 0.03 K/UL (ref 0–0.04)
IMM GRANULOCYTES NFR BLD AUTO: 0.5 % (ref 0–0.5)
LYMPHOCYTES # BLD AUTO: 1.6 K/UL (ref 1–4.8)
LYMPHOCYTES NFR BLD: 26.1 % (ref 18–48)
MCH RBC QN AUTO: 23.7 PG (ref 27–31)
MCHC RBC AUTO-ENTMCNC: 30.8 G/DL (ref 32–36)
MCV RBC AUTO: 77 FL (ref 82–98)
MONOCYTES # BLD AUTO: 0.3 K/UL (ref 0.3–1)
MONOCYTES NFR BLD: 4.1 % (ref 4–15)
NEUTROPHILS # BLD AUTO: 4.2 K/UL (ref 1.8–7.7)
NEUTROPHILS NFR BLD: 67.5 % (ref 38–73)
NRBC BLD-RTO: 0 /100 WBC
PLATELET # BLD AUTO: 144 K/UL (ref 150–450)
PMV BLD AUTO: 10.7 FL (ref 9.2–12.9)
RBC # BLD AUTO: 3.08 M/UL (ref 4–5.4)
WBC # BLD AUTO: 6.14 K/UL (ref 3.9–12.7)

## 2022-12-23 PROCEDURE — 36415 COLL VENOUS BLD VENIPUNCTURE: CPT | Performed by: OBSTETRICS & GYNECOLOGY

## 2022-12-23 PROCEDURE — 85025 COMPLETE CBC W/AUTO DIFF WBC: CPT | Performed by: OBSTETRICS & GYNECOLOGY

## 2022-12-23 PROCEDURE — 25000003 PHARM REV CODE 250: Performed by: STUDENT IN AN ORGANIZED HEALTH CARE EDUCATION/TRAINING PROGRAM

## 2022-12-23 PROCEDURE — 25000003 PHARM REV CODE 250: Performed by: OBSTETRICS & GYNECOLOGY

## 2022-12-23 PROCEDURE — 25000003 PHARM REV CODE 250

## 2022-12-23 PROCEDURE — 63600175 PHARM REV CODE 636 W HCPCS: Performed by: STUDENT IN AN ORGANIZED HEALTH CARE EDUCATION/TRAINING PROGRAM

## 2022-12-23 PROCEDURE — 11000001 HC ACUTE MED/SURG PRIVATE ROOM

## 2022-12-23 RX ORDER — OXYCODONE HYDROCHLORIDE 5 MG/1
5 TABLET ORAL EVERY 4 HOURS PRN
Status: DISCONTINUED | OUTPATIENT
Start: 2022-12-23 | End: 2022-12-24 | Stop reason: HOSPADM

## 2022-12-23 RX ORDER — OXYCODONE HYDROCHLORIDE 5 MG/1
10 TABLET ORAL EVERY 4 HOURS PRN
Status: DISCONTINUED | OUTPATIENT
Start: 2022-12-23 | End: 2022-12-24 | Stop reason: HOSPADM

## 2022-12-23 RX ORDER — ADHESIVE BANDAGE
30 BANDAGE TOPICAL DAILY PRN
Status: DISCONTINUED | OUTPATIENT
Start: 2022-12-23 | End: 2022-12-24 | Stop reason: HOSPADM

## 2022-12-23 RX ADMIN — SIMETHICONE 160 MG: 80 TABLET, CHEWABLE ORAL at 03:12

## 2022-12-23 RX ADMIN — IBUPROFEN 800 MG: 400 TABLET, FILM COATED ORAL at 09:12

## 2022-12-23 RX ADMIN — ACETAMINOPHEN 650 MG: 325 TABLET, FILM COATED ORAL at 09:12

## 2022-12-23 RX ADMIN — DOCUSATE SODIUM 200 MG: 100 CAPSULE, LIQUID FILLED ORAL at 09:12

## 2022-12-23 RX ADMIN — IBUPROFEN 800 MG: 400 TABLET, FILM COATED ORAL at 04:12

## 2022-12-23 RX ADMIN — OXYCODONE 10 MG: 5 TABLET ORAL at 07:12

## 2022-12-23 RX ADMIN — SIMETHICONE 160 MG: 80 TABLET, CHEWABLE ORAL at 09:12

## 2022-12-23 RX ADMIN — ACETAMINOPHEN 650 MG: 325 TABLET, FILM COATED ORAL at 03:12

## 2022-12-23 RX ADMIN — VALACYCLOVIR HYDROCHLORIDE 500 MG: 500 TABLET, FILM COATED ORAL at 09:12

## 2022-12-23 RX ADMIN — KETOROLAC TROMETHAMINE 30 MG: 30 INJECTION, SOLUTION INTRAMUSCULAR; INTRAVENOUS at 03:12

## 2022-12-23 RX ADMIN — OXYCODONE 10 MG: 5 TABLET ORAL at 04:12

## 2022-12-23 RX ADMIN — MAGNESIUM HYDROXIDE 2400 MG: 400 SUSPENSION ORAL at 09:12

## 2022-12-23 NOTE — PLAN OF CARE
Mother to breastfeed infant 8 or more times in 24hrs on infant's cue until content, frequent skin to skin.  Mother is to keep infant actively feeding by keeping infant stimulated and using breast compression. Mother ensure effective nursing by hearing infant swallows and feeling nice tugs and pulls. Latch should not be painful while nursing.  Mother to record all breastfeedings, voids and stools in breastfeeding guide. Mother to call LC for breastfeeding assistance or questions .  Refer breastfeeding guide for lactation education.       Attempt latch, nurse, pump, supplement.

## 2022-12-23 NOTE — LACTATION NOTE
12/23/22 1015   Maternal Infant Feeding   Latch Assistance no  (to call)   Equipment Type   Breast Pump Type double electric, hospital grade   Breast Pump Flange Type hard   Breast Pump Flange Size 24 mm   Breast Pumping   Breast Pumping Interventions early pumping promoted;frequent pumping encouraged;post-feed pumping encouraged   Community Referrals   Community Referrals support group;pediatric care provider;outpatient lactation program       1015- Basic lactation education reviewed with breastfeeding guide. Mother concerned baby not getting any milk at breast, baby always showing hunger cues, sucking hands, never satisfied, very spitty, worried about reflux. Ped NP to bedside for assessment, reassured mom normal for day 1-2 to spit up when passing meconium. Mother reports baby was suctioned overnight- 6mL fluid.     Reviewed hiccups in utero, baby may be flow dependent and may still require supplement after nursing until milk volume increases. RN set up Symphony pump. Plan to attempt latch, pump, supplement; using formula per preference. Mom has Motif Cyndy for home use.      number on board.     1225- LC to room. Mom asleep, grandma holding baby. Baby would not latch, got bottle, to call for another feeding.  number on board.

## 2022-12-23 NOTE — PROGRESS NOTES
POSTPARTUM PROGRESS NOTE    Subjective:     PPD/POD#: 1   Procedure: Repeat LTCS with BTL   EGA: 39w0d   N/V: No   F/C: No   Abd Pain: Mild, well-controlled with oral pain medication   Lochia: Mild   Voiding: Yes   Ambulating: Yes   Bowel fnc: Yes   Breastfeeding: Yes   Contraception: Per primary OB   Circumcision: N/A, female     Objective:      Temp:  [97.6 °F (36.4 °C)-98.6 °F (37 °C)] 97.6 °F (36.4 °C)  Pulse:  [] 108  Resp:  [16-18] 18  SpO2:  [97 %-100 %] 99 %  BP: ()/(53-82) 118/60    Lung: Normal respiratory effort   Abdomen: Soft, appropriately tender   Uterus: Firm, no fundal tenderness   Incision: Bandage in place without shadowing   : Deferred   Extremities: Bilateral trace edema     Lab Review    No results for input(s): NA, K, CL, CO2, BUN, CREATININE, GLU, PROT, BILITOT, ALKPHOS, ALT, AST, MG, PHOS in the last 168 hours.    Recent Labs   Lab 12/22/22  0614   WBC 6.40   HGB 9.8*   HCT 31.0*   MCV 75*            I/O    Intake/Output Summary (Last 24 hours) at 12/23/2022 1010  Last data filed at 12/23/2022 0120  Gross per 24 hour   Intake --   Output 1700 ml   Net -1700 ml        Assessment and Plan:   Postpartum care:  - Patient doing well.  - Continue routine management and advances.      Gunjan Barrera MD,MIC  Date and Time: 12/23/2022 10:10 AM  OB Hospitalist

## 2022-12-23 NOTE — PLAN OF CARE
Vitals stable, voiding, ambulating independently, pain controlled with ordered pain medication, fundus firm with light lochia, dressing in place with small dried drainage, will continue to monitor.   Problem:  Fall Injury Risk  Goal: Absence of Fall, Infant Drop and Related Injury  Outcome: Ongoing, Progressing     Problem: Adult Inpatient Plan of Care  Goal: Plan of Care Review  Outcome: Ongoing, Progressing  Goal: Patient-Specific Goal (Individualized)  Outcome: Ongoing, Progressing  Goal: Absence of Hospital-Acquired Illness or Injury  Outcome: Ongoing, Progressing  Goal: Optimal Comfort and Wellbeing  Outcome: Ongoing, Progressing  Goal: Readiness for Transition of Care  Outcome: Ongoing, Progressing     Problem: Bleeding ( Delivery)  Goal: Bleeding is Controlled  Outcome: Ongoing, Progressing     Problem: Change in Fetal Wellbeing ( Delivery)  Goal: Stable Fetal Wellbeing  Outcome: Ongoing, Progressing     Problem: Infection ( Delivery)  Goal: Absence of Infection Signs and Symptoms  Outcome: Ongoing, Progressing     Problem: Respiratory Compromise ( Delivery)  Goal: Effective Oxygenation and Ventilation  Outcome: Ongoing, Progressing     Problem: Breastfeeding  Goal: Effective Breastfeeding  Outcome: Ongoing, Progressing     Problem: Adjustment to Role Transition (Postpartum  Delivery)  Goal: Successful Maternal Role Transition  Outcome: Ongoing, Progressing     Problem: Bleeding (Postpartum  Delivery)  Goal: Hemostasis  Outcome: Ongoing, Progressing     Problem: Infection (Postpartum  Delivery)  Goal: Absence of Infection Signs and Symptoms  Outcome: Ongoing, Progressing     Problem: Pain (Postpartum  Delivery)  Goal: Acceptable Pain Control  Outcome: Ongoing, Progressing     Problem: Postoperative Nausea and Vomiting (Postpartum  Delivery)  Goal: Nausea and Vomiting Relief  Outcome: Ongoing, Progressing     Problem:  Postoperative Urinary Retention (Postpartum  Delivery)  Goal: Effective Urinary Elimination  Outcome: Ongoing, Progressing     Problem: Device-Related Complication Risk (Anesthesia/Analgesia, Neuraxial)  Goal: Safe Infusion Delivery Completion  Outcome: Ongoing, Progressing     Problem: Infection (Anesthesia/Analgesia, Neuraxial)  Goal: Absence of Infection Signs and Symptoms  Outcome: Ongoing, Progressing     Problem: Nausea and Vomiting (Anesthesia/Analgesia, Neuraxial)  Goal: Nausea and Vomiting Relief  Outcome: Ongoing, Progressing     Problem: Pain (Anesthesia/Analgesia, Neuraxial)  Goal: Effective Pain Control  Outcome: Ongoing, Progressing     Problem: Respiratory Compromise (Anesthesia/Analgesia, Neuraxial)  Goal: Effective Oxygenation and Ventilation  Outcome: Ongoing, Progressing     Problem: Sensorimotor Impairment (Anesthesia/Analgesia, Neuraxial)  Goal: Baseline Motor Function  Outcome: Ongoing, Progressing     Problem: Urinary Retention (Anesthesia/Analgesia, Neuraxial)  Goal: Effective Urinary Elimination  Outcome: Ongoing, Progressing     Problem: Infection  Goal: Absence of Infection Signs and Symptoms  Outcome: Ongoing, Progressing

## 2022-12-24 VITALS
RESPIRATION RATE: 16 BRPM | TEMPERATURE: 98 F | DIASTOLIC BLOOD PRESSURE: 64 MMHG | SYSTOLIC BLOOD PRESSURE: 115 MMHG | OXYGEN SATURATION: 99 % | HEART RATE: 94 BPM

## 2022-12-24 LAB
ERYTHROCYTE [DISTWIDTH] IN BLOOD BY AUTOMATED COUNT: 17 % (ref 11.5–14.5)
HCT VFR BLD AUTO: 25.8 % (ref 37–48.5)
HGB BLD-MCNC: 7.9 G/DL (ref 12–16)
MCH RBC QN AUTO: 23.7 PG (ref 27–31)
MCHC RBC AUTO-ENTMCNC: 30.6 G/DL (ref 32–36)
MCV RBC AUTO: 78 FL (ref 82–98)
PLATELET # BLD AUTO: 163 K/UL (ref 150–450)
PMV BLD AUTO: 11 FL (ref 9.2–12.9)
RBC # BLD AUTO: 3.33 M/UL (ref 4–5.4)
WBC # BLD AUTO: 6.21 K/UL (ref 3.9–12.7)

## 2022-12-24 PROCEDURE — 25000003 PHARM REV CODE 250: Performed by: STUDENT IN AN ORGANIZED HEALTH CARE EDUCATION/TRAINING PROGRAM

## 2022-12-24 PROCEDURE — 36415 COLL VENOUS BLD VENIPUNCTURE: CPT | Performed by: OBSTETRICS & GYNECOLOGY

## 2022-12-24 PROCEDURE — 25000003 PHARM REV CODE 250

## 2022-12-24 PROCEDURE — 25000003 PHARM REV CODE 250: Performed by: OBSTETRICS & GYNECOLOGY

## 2022-12-24 PROCEDURE — 85027 COMPLETE CBC AUTOMATED: CPT | Performed by: OBSTETRICS & GYNECOLOGY

## 2022-12-24 RX ORDER — FERROUS SULFATE 325(65) MG
325 TABLET, DELAYED RELEASE (ENTERIC COATED) ORAL DAILY
Qty: 30 TABLET | Refills: 1 | Status: SHIPPED | OUTPATIENT
Start: 2022-12-24 | End: 2023-02-22

## 2022-12-24 RX ORDER — LANOLIN ALCOHOL/MO/W.PET/CERES
1 CREAM (GRAM) TOPICAL DAILY
Status: DISCONTINUED | OUTPATIENT
Start: 2022-12-24 | End: 2022-12-24 | Stop reason: HOSPADM

## 2022-12-24 RX ADMIN — ACETAMINOPHEN 650 MG: 325 TABLET, FILM COATED ORAL at 10:12

## 2022-12-24 RX ADMIN — SIMETHICONE 160 MG: 80 TABLET, CHEWABLE ORAL at 09:12

## 2022-12-24 RX ADMIN — IBUPROFEN 800 MG: 400 TABLET, FILM COATED ORAL at 01:12

## 2022-12-24 RX ADMIN — DOCUSATE SODIUM 200 MG: 100 CAPSULE, LIQUID FILLED ORAL at 09:12

## 2022-12-24 RX ADMIN — OXYCODONE 5 MG: 5 TABLET ORAL at 07:12

## 2022-12-24 RX ADMIN — OXYCODONE 10 MG: 5 TABLET ORAL at 01:12

## 2022-12-24 RX ADMIN — VALACYCLOVIR HYDROCHLORIDE 500 MG: 500 TABLET, FILM COATED ORAL at 09:12

## 2022-12-24 RX ADMIN — ACETAMINOPHEN 650 MG: 325 TABLET, FILM COATED ORAL at 05:12

## 2022-12-24 RX ADMIN — IBUPROFEN 800 MG: 400 TABLET, FILM COATED ORAL at 09:12

## 2022-12-24 NOTE — LACTATION NOTE
Lactation discharge education completed. Plan of care is for pt to follow basic breastfeeding education, frequent feeding based on baby's cues, and to monitor baby's voids and stools. Breastfeeding guide, including First Alert survey, resource list, and lactation warmline phone number reviewed. Based on Pt reports of feeding, LC encouraged Pt to nurse, pump, and supplement. LC reminded Pt to use breast compression with stimulation to keep baby actively feeding at the breast. Pt to notify doctor for maternal or infant concerns, as reviewed with LC. Pt verbalizes understanding and questions answered.

## 2022-12-24 NOTE — DISCHARGE SUMMARY
Delivery Discharge Summary  Obstetrics      Primary OB Clinician: Robert Shields MD      Admission date: 2022  Discharge date: 2022    Disposition: To home, self care    Discharge Diagnosis List:      Patient Active Problem List   Diagnosis    Herpes simplex infection of genitourinary system     delivery delivered     delivery, delivered, current hospitalization    History of  delivery    Status post  section    History of bilateral tubal ligation       Procedure: , due to prior c/s x 2    Hospital Course:  Leydi Matias is a 32 y.o. now , POD #2 who was admitted on 2022 at 39w0d for rLTCS with BTL. Patient was subsequently admitted to labor and delivery unit with signed consents.     Delivery via  which was performed without complications.Please see delivery note for further details. Her postpartum course was complicated by post-op anemia, asymptomatic and H/H stable, will send home with daily iron. Reports vaginal bleeding minimal. On discharge day, patient's pain is controlled with oral pain medications. Pt is tolerating ambulation without SOB or CP, and regular diet without N/V. Reports lochia is mild. Denies any HA, vision changes, F/C. Minimal LE swelling, which is improving. Denies any breast pain/soreness.    Pt in stable condition and ready for discharge. She has been instructed to start and/or continue medications and follow up with her obstetrics provider as listed below.    Pertinent studies:  CBC  Recent Labs   Lab 22  0614 22  1021 22  0619   WBC 6.40 6.14 6.21   HGB 9.8* 7.3* 7.9*   HCT 31.0* 23.7* 25.8*   MCV 75* 77* 78*    144* 163          Immunization History   Administered Date(s) Administered    COVID-19, MRNA, LN-S, PF (MODERNA FULL 0.5 ML DOSE) 2021    Influenza - Quadrivalent - PF *Preferred* (6 months and older) 2022    Tdap 2017, 2022        Delivery:     Episiotomy:     Lacerations:     Repair suture:     Repair # of packets:     Blood loss (ml):       Birth information:  YOB: 2024   Time of birth: 7:54 AM   Sex: female   Delivery type: , Low Transverse   Gestational Age: 39w0d    Delivery Clinician:      Other providers:       Additional  information:  Forceps:    Vacuum:    Breech:    Observed anomalies      Living?:           APGARS  One minute Five minutes Ten minutes   Skin color:         Heart rate:         Grimace:         Muscle tone:         Breathing:         Totals: 8  9        Placenta: Delivered:       appearance    Patient Instructions:   Current Discharge Medication List        START taking these medications    Details   docusate sodium (COLACE) 100 MG capsule Take 1 capsule (100 mg total) by mouth 2 (two) times daily.  Qty: 60 capsule, Refills: 0      ferrous sulfate 325 (65 FE) MG EC tablet Take 1 tablet (325 mg total) by mouth once daily.  Qty: 30 tablet, Refills: 1      ibuprofen (ADVIL,MOTRIN) 800 MG tablet Take 1 tablet (800 mg total) by mouth every 8 (eight) hours.  Qty: 60 tablet, Refills: 1      oxyCODONE (ROXICODONE) 5 MG immediate release tablet Take 1 tablet (5 mg total) by mouth every 6 (six) hours as needed (Severe pain).  Qty: 28 tablet, Refills: 0    Comments: Quantity prescribed more than 7 day supply? No           CONTINUE these medications which have NOT CHANGED    Details   PNV no.95/ferrous fum/folic ac (PRENATAL ORAL) Take by mouth once daily at 6am.           STOP taking these medications       valACYclovir (VALTREX) 500 MG tablet Comments:   Reason for Stopping:               Discharge Procedure Orders   Leave dressing on - Keep it clean, dry, and intact until clinic visit     Pelvic Rest   Order Comments: No intercourse or tampons for 6 weeks     No driving until:   Order Comments: Do not drive until no longer taking narcotic medications and feel comfortable pressing on the brakes     Lifting  restrictions   Order Comments: Nothing over 15 lbs for 4-6 weeks     Notify your health care provider if you experience any of the following:  temperature >100.4     Notify your health care provider if you experience any of the following:  persistent nausea and vomiting or diarrhea     Notify your health care provider if you experience any of the following:  severe uncontrolled pain     Notify your health care provider if you experience any of the following:  difficulty breathing or increased cough     Notify your health care provider if you experience any of the following:  severe persistent headache     Notify your health care provider if you experience any of the following:  worsening rash     Notify your health care provider if you experience any of the following:  persistent dizziness, light-headedness, or visual disturbances     Notify your health care provider if you experience any of the following:  increased confusion or weakness     Notify your health care provider if you experience any of the following:   Order Comments: Vaginal bleeding more than 1 pad in 1 hour        Follow-up Information       Robert Shields MD Follow up in 2 week(s).    Specialties: Obstetrics, Gynecology, Obstetrics and Gynecology  Why: Incision check  Contact information:  [address]  [address] [place] [address] [id number] [phone]                              Sailaja Aponte MD  Obstetrics and Gynecology  [facility]

## 2022-12-24 NOTE — PLAN OF CARE
Vital stable, voiding, ambulating independently, pain controlled with ordered pain medication, fundus firm with light lochia, dressing to incision, patient cleared for discharge home with baby.   Problem:  Fall Injury Risk  Goal: Absence of Fall, Infant Drop and Related Injury  Outcome: Met     Problem: Adult Inpatient Plan of Care  Goal: Plan of Care Review  Outcome: Met  Goal: Patient-Specific Goal (Individualized)  Outcome: Met  Goal: Absence of Hospital-Acquired Illness or Injury  Outcome: Met  Goal: Optimal Comfort and Wellbeing  Outcome: Met  Goal: Readiness for Transition of Care  Outcome: Met     Problem: Bleeding ( Delivery)  Goal: Bleeding is Controlled  Outcome: Met     Problem: Change in Fetal Wellbeing ( Delivery)  Goal: Stable Fetal Wellbeing  Outcome: Met     Problem: Infection ( Delivery)  Goal: Absence of Infection Signs and Symptoms  Outcome: Met     Problem: Respiratory Compromise ( Delivery)  Goal: Effective Oxygenation and Ventilation  Outcome: Met     Problem: Breastfeeding  Goal: Effective Breastfeeding  Outcome: Met     Problem: Adjustment to Role Transition (Postpartum  Delivery)  Goal: Successful Maternal Role Transition  Outcome: Met     Problem: Bleeding (Postpartum  Delivery)  Goal: Hemostasis  Outcome: Met     Problem: Infection (Postpartum  Delivery)  Goal: Absence of Infection Signs and Symptoms  Outcome: Met     Problem: Pain (Postpartum  Delivery)  Goal: Acceptable Pain Control  Outcome: Met     Problem: Postoperative Nausea and Vomiting (Postpartum  Delivery)  Goal: Nausea and Vomiting Relief  Outcome: Met     Problem: Postoperative Urinary Retention (Postpartum  Delivery)  Goal: Effective Urinary Elimination  Outcome: Met     Problem: Device-Related Complication Risk (Anesthesia/Analgesia, Neuraxial)  Goal: Safe Infusion Delivery Completion  Outcome: Met     Problem: Infection  (Anesthesia/Analgesia, Neuraxial)  Goal: Absence of Infection Signs and Symptoms  Outcome: Met     Problem: Nausea and Vomiting (Anesthesia/Analgesia, Neuraxial)  Goal: Nausea and Vomiting Relief  Outcome: Met     Problem: Pain (Anesthesia/Analgesia, Neuraxial)  Goal: Effective Pain Control  Outcome: Met     Problem: Respiratory Compromise (Anesthesia/Analgesia, Neuraxial)  Goal: Effective Oxygenation and Ventilation  Outcome: Met     Problem: Sensorimotor Impairment (Anesthesia/Analgesia, Neuraxial)  Goal: Baseline Motor Function  Outcome: Met     Problem: Urinary Retention (Anesthesia/Analgesia, Neuraxial)  Goal: Effective Urinary Elimination  Outcome: Met     Problem: Infection  Goal: Absence of Infection Signs and Symptoms  Outcome: Met

## 2022-12-24 NOTE — PROGRESS NOTES
POSTPARTUM PROGRESS NOTE    Subjective:     PPD/POD#: 2   Procedure: Repeat LTCS with BTL   EGA: 39w0d   N/V: No   F/C: No   Abd Pain: Mild, well-controlled with oral pain medication   Lochia: Mild   Voiding: Yes   Ambulating: Yes   Bowel fnc: Yes   Breastfeeding: Yes   Contraception: Per primary OB   Circumcision: N/A, female     Objective:      Temp:  [98 °F (36.7 °C)-98.3 °F (36.8 °C)] 98.3 °F (36.8 °C)  Pulse:  [] 94  Resp:  [16-18] 16  SpO2:  [97 %-99 %] 99 %  BP: (102-115)/(50-64) 115/64    Lung: Normal respiratory effort   Abdomen: Soft, appropriately tender   Uterus: Firm, no fundal tenderness   Incision: Bandage in place without shadowing   : Deferred   Extremities: Bilateral trace edema     Lab Review    No results for input(s): NA, K, CL, CO2, BUN, CREATININE, GLU, PROT, BILITOT, ALKPHOS, ALT, AST, MG, PHOS in the last 168 hours.    Recent Labs   Lab 12/22/22  0614 12/23/22  1021 12/24/22  0619   WBC 6.40 6.14 6.21   HGB 9.8* 7.3* 7.9*   HCT 31.0* 23.7* 25.8*   MCV 75* 77* 78*    144* 163         I/O    Intake/Output Summary (Last 24 hours) at 12/24/2022 1111  Last data filed at 12/23/2022 1300  Gross per 24 hour   Intake --   Output 500 ml   Net -500 ml        Assessment and Plan:   Anemia (acute blood loss anemia post-operatively):  - H/H as above  - QBL: 580  - asymptomatic, VSS  - iron/colace    Postpartum care:  - Patient doing well.  - Continue routine management and advances.  - Stable for discharge home today.    Sailaja Apnote MD  Obstetrics and Gynecology  [facility]

## 2022-12-24 NOTE — ANESTHESIA POSTPROCEDURE EVALUATION
Anesthesia Post Evaluation    Patient: Leydi Matias    Procedure(s) Performed: Procedure(s) (LRB):   SECTION, WITH TUBAL LIGATION (N/A)    Final Anesthesia Type: CSE      Patient location during evaluation: labor & delivery  Patient participation: Yes- Able to Participate  Level of consciousness: awake and alert and oriented  Post-procedure vital signs: reviewed and stable  Pain management: adequate  Airway patency: patent    PONV status at discharge: No PONV  Anesthetic complications: no      Cardiovascular status: blood pressure returned to baseline and hemodynamically stable  Respiratory status: unassisted, spontaneous ventilation and room air  Hydration status: euvolemic  Follow-up not needed.          Vitals Value Taken Time   /64 22 0837   Temp 36.8 °C (98.3 °F) 22 0837   Pulse 94 22 0837   Resp 16 22 0837   SpO2 99 % 22         Event Time   Out of Recovery 11:30:00         Pain/Deedee Score: Pain Rating Prior to Med Admin: 6 (2022 10:43 AM)  Pain Rating Post Med Admin: 3 (2022  2:15 AM)

## 2022-12-27 ENCOUNTER — TELEPHONE (OUTPATIENT)
Dept: OBSTETRICS AND GYNECOLOGY | Facility: CLINIC | Age: 32
End: 2022-12-27
Payer: COMMERCIAL

## 2022-12-27 ENCOUNTER — PATIENT MESSAGE (OUTPATIENT)
Dept: OBSTETRICS AND GYNECOLOGY | Facility: CLINIC | Age: 32
End: 2022-12-27
Payer: COMMERCIAL

## 2022-12-27 NOTE — TELEPHONE ENCOUNTER
"Spoke with pt.     Post op c/s 12/22 c/o Generalized swelling but significant edema in lower extremities.  Cannot get compression sock on due to swelling, states it cuts off circulation.  Reassured her swelling is usually worse between day 4-7 postpartum but should improve within 2 weeks.      Also reports sharp pain above umbilicus to breasts.  Has been severely constipated.  Used an enema 2 days ago and has also been taking Colace and Dulcolax.  Has had 2 BMs, both were "miserable", one small and the next one was larger but not big.  No improvement in abd pain.  Abd is soft.  Still feels constipated.  Asking if she can take Mag Citrate.  Reassured her she can, if constipation has resolved and still having abd pain call back.  Go to [facility] if pain is severe.    "

## 2022-12-29 ENCOUNTER — PATIENT MESSAGE (OUTPATIENT)
Dept: OBSTETRICS AND GYNECOLOGY | Facility: OTHER | Age: 32
End: 2022-12-29
Payer: COMMERCIAL

## 2022-12-30 LAB
FINAL PATHOLOGIC DIAGNOSIS: NORMAL
Lab: NORMAL

## 2023-01-01 NOTE — PROGRESS NOTES
"  Subjective:     Leydi Matias is a 32 y.o.  who presents for a two week post-operative follow up after a  delivery. Today she denies nausea or vomiting. Pain has been well controlled. Denies redness or drainage from the incision. Breast and Formula Feeding. Reports baby is doing well overall. Denies symptoms of postpartum depression. Overall doing well.      Review the Delivery Report for details.     Objective:     Vitals:  /76   Ht 5' 7" (1.702 m)   Wt 97 kg (213 lb 13.5 oz)   LMP  (LMP Unknown)   Breastfeeding Yes Comment: trying to breast feed but milk barely producing  BMI 33.49 kg/m²     General:   Alert and cooperative   Abdomen:  Abdomen is soft, non distended, non-tender.    Incision:  Clean, healing well. No erythema or drainage.     Skin:  Warm and dry, bilateral trace edema.      Assessment:     Postpartum Care  - S/p  delivery with BTL - 2 weeks post-op (22)  - Baby overall doing well  - Patient endorses breast / bottle feeding  - Minimal bleeding  - Not taking iron  - LE edema resolving!  - Constipation improved now  - Uncomfortable sensation at end of voiding, no dysuria/hematuria, no fevers or back pain, started about 2 days ago; cystitis precautions; urine testing today  - Currently using BTL for contraception  - No s/sx postpartum depression, good support at home  - Will plan to follow-up in 4 weeks for a 6wk PP visit with pap smear with Dr. Shields    Plan:     1. Continue daily wound care.  2. Pelvic rest for 6 weeks postpartum.    3. Gradually resume normal activities.  4. Return to clinic in 4 weeks for final post partum follow up.    "

## 2023-01-04 ENCOUNTER — POSTPARTUM VISIT (OUTPATIENT)
Dept: OBSTETRICS AND GYNECOLOGY | Facility: CLINIC | Age: 33
End: 2023-01-04
Payer: COMMERCIAL

## 2023-01-04 VITALS
WEIGHT: 213.88 LBS | BODY MASS INDEX: 33.57 KG/M2 | HEIGHT: 67 IN | SYSTOLIC BLOOD PRESSURE: 118 MMHG | DIASTOLIC BLOOD PRESSURE: 76 MMHG

## 2023-01-04 PROCEDURE — 99999 PR PBB SHADOW E&M-EST. PATIENT-LVL III: CPT | Mod: PBBFAC,,, | Performed by: STUDENT IN AN ORGANIZED HEALTH CARE EDUCATION/TRAINING PROGRAM

## 2023-01-04 PROCEDURE — 0503F PR POSTPARTUM CARE VISIT: ICD-10-PCS | Mod: CPTII,S$GLB,, | Performed by: STUDENT IN AN ORGANIZED HEALTH CARE EDUCATION/TRAINING PROGRAM

## 2023-01-04 PROCEDURE — 99999 PR PBB SHADOW E&M-EST. PATIENT-LVL III: ICD-10-PCS | Mod: PBBFAC,,, | Performed by: STUDENT IN AN ORGANIZED HEALTH CARE EDUCATION/TRAINING PROGRAM

## 2023-01-04 PROCEDURE — 81001 URINALYSIS AUTO W/SCOPE: CPT | Performed by: STUDENT IN AN ORGANIZED HEALTH CARE EDUCATION/TRAINING PROGRAM

## 2023-01-04 PROCEDURE — 0503F POSTPARTUM CARE VISIT: CPT | Mod: CPTII,S$GLB,, | Performed by: STUDENT IN AN ORGANIZED HEALTH CARE EDUCATION/TRAINING PROGRAM

## 2023-01-04 PROCEDURE — 87086 URINE CULTURE/COLONY COUNT: CPT | Performed by: STUDENT IN AN ORGANIZED HEALTH CARE EDUCATION/TRAINING PROGRAM

## 2023-01-05 LAB
BACTERIA #/AREA URNS AUTO: NORMAL /HPF
BILIRUB UR QL STRIP: NEGATIVE
CLARITY UR REFRACT.AUTO: CLEAR
COLOR UR AUTO: YELLOW
GLUCOSE UR QL STRIP: NEGATIVE
HGB UR QL STRIP: NEGATIVE
KETONES UR QL STRIP: NEGATIVE
LEUKOCYTE ESTERASE UR QL STRIP: ABNORMAL
MICROSCOPIC COMMENT: NORMAL
NITRITE UR QL STRIP: NEGATIVE
PH UR STRIP: 6 [PH] (ref 5–8)
PROT UR QL STRIP: NEGATIVE
RBC #/AREA URNS AUTO: 0 /HPF (ref 0–4)
SP GR UR STRIP: 1.01 (ref 1–1.03)
SQUAMOUS #/AREA URNS AUTO: 7 /HPF
UNSPECIFIED CRY UR QL COMP ASSIST: <1
URN SPEC COLLECT METH UR: ABNORMAL
WBC #/AREA URNS AUTO: 5 /HPF (ref 0–5)

## 2023-01-06 LAB — BACTERIA UR CULT: NORMAL

## 2023-03-02 ENCOUNTER — OFFICE VISIT (OUTPATIENT)
Dept: PRIMARY CARE CLINIC | Facility: CLINIC | Age: 33
End: 2023-03-02
Payer: COMMERCIAL

## 2023-03-02 VITALS
WEIGHT: 225.75 LBS | BODY MASS INDEX: 35.43 KG/M2 | OXYGEN SATURATION: 98 % | SYSTOLIC BLOOD PRESSURE: 112 MMHG | HEIGHT: 67 IN | HEART RATE: 94 BPM | DIASTOLIC BLOOD PRESSURE: 78 MMHG

## 2023-03-02 DIAGNOSIS — K59.00 CONSTIPATION, UNSPECIFIED CONSTIPATION TYPE: ICD-10-CM

## 2023-03-02 DIAGNOSIS — E66.9 OBESITY (BMI 35.0-39.9 WITHOUT COMORBIDITY): ICD-10-CM

## 2023-03-02 DIAGNOSIS — Z00.00 ANNUAL PHYSICAL EXAM: Primary | ICD-10-CM

## 2023-03-02 PROCEDURE — 1160F RVW MEDS BY RX/DR IN RCRD: CPT | Mod: CPTII,S$GLB,, | Performed by: STUDENT IN AN ORGANIZED HEALTH CARE EDUCATION/TRAINING PROGRAM

## 2023-03-02 PROCEDURE — 1160F PR REVIEW ALL MEDS BY PRESCRIBER/CLIN PHARMACIST DOCUMENTED: ICD-10-PCS | Mod: CPTII,S$GLB,, | Performed by: STUDENT IN AN ORGANIZED HEALTH CARE EDUCATION/TRAINING PROGRAM

## 2023-03-02 PROCEDURE — 3078F PR MOST RECENT DIASTOLIC BLOOD PRESSURE < 80 MM HG: ICD-10-PCS | Mod: CPTII,S$GLB,, | Performed by: STUDENT IN AN ORGANIZED HEALTH CARE EDUCATION/TRAINING PROGRAM

## 2023-03-02 PROCEDURE — 3044F PR MOST RECENT HEMOGLOBIN A1C LEVEL <7.0%: ICD-10-PCS | Mod: CPTII,S$GLB,, | Performed by: STUDENT IN AN ORGANIZED HEALTH CARE EDUCATION/TRAINING PROGRAM

## 2023-03-02 PROCEDURE — 1159F MED LIST DOCD IN RCRD: CPT | Mod: CPTII,S$GLB,, | Performed by: STUDENT IN AN ORGANIZED HEALTH CARE EDUCATION/TRAINING PROGRAM

## 2023-03-02 PROCEDURE — 3044F HG A1C LEVEL LT 7.0%: CPT | Mod: CPTII,S$GLB,, | Performed by: STUDENT IN AN ORGANIZED HEALTH CARE EDUCATION/TRAINING PROGRAM

## 2023-03-02 PROCEDURE — 3074F PR MOST RECENT SYSTOLIC BLOOD PRESSURE < 130 MM HG: ICD-10-PCS | Mod: CPTII,S$GLB,, | Performed by: STUDENT IN AN ORGANIZED HEALTH CARE EDUCATION/TRAINING PROGRAM

## 2023-03-02 PROCEDURE — 99999 PR PBB SHADOW E&M-EST. PATIENT-LVL IV: CPT | Mod: PBBFAC,,, | Performed by: STUDENT IN AN ORGANIZED HEALTH CARE EDUCATION/TRAINING PROGRAM

## 2023-03-02 PROCEDURE — 1159F PR MEDICATION LIST DOCUMENTED IN MEDICAL RECORD: ICD-10-PCS | Mod: CPTII,S$GLB,, | Performed by: STUDENT IN AN ORGANIZED HEALTH CARE EDUCATION/TRAINING PROGRAM

## 2023-03-02 PROCEDURE — 99214 OFFICE O/P EST MOD 30 MIN: CPT | Mod: S$GLB,,, | Performed by: STUDENT IN AN ORGANIZED HEALTH CARE EDUCATION/TRAINING PROGRAM

## 2023-03-02 PROCEDURE — 99999 PR PBB SHADOW E&M-EST. PATIENT-LVL IV: ICD-10-PCS | Mod: PBBFAC,,, | Performed by: STUDENT IN AN ORGANIZED HEALTH CARE EDUCATION/TRAINING PROGRAM

## 2023-03-02 PROCEDURE — 3008F BODY MASS INDEX DOCD: CPT | Mod: CPTII,S$GLB,, | Performed by: STUDENT IN AN ORGANIZED HEALTH CARE EDUCATION/TRAINING PROGRAM

## 2023-03-02 PROCEDURE — 99214 PR OFFICE/OUTPT VISIT, EST, LEVL IV, 30-39 MIN: ICD-10-PCS | Mod: S$GLB,,, | Performed by: STUDENT IN AN ORGANIZED HEALTH CARE EDUCATION/TRAINING PROGRAM

## 2023-03-02 PROCEDURE — 3078F DIAST BP <80 MM HG: CPT | Mod: CPTII,S$GLB,, | Performed by: STUDENT IN AN ORGANIZED HEALTH CARE EDUCATION/TRAINING PROGRAM

## 2023-03-02 PROCEDURE — 3008F PR BODY MASS INDEX (BMI) DOCUMENTED: ICD-10-PCS | Mod: CPTII,S$GLB,, | Performed by: STUDENT IN AN ORGANIZED HEALTH CARE EDUCATION/TRAINING PROGRAM

## 2023-03-02 PROCEDURE — 3074F SYST BP LT 130 MM HG: CPT | Mod: CPTII,S$GLB,, | Performed by: STUDENT IN AN ORGANIZED HEALTH CARE EDUCATION/TRAINING PROGRAM

## 2023-03-02 RX ORDER — POLYETHYLENE GLYCOL 3350 17 G/17G
17 POWDER, FOR SOLUTION ORAL DAILY
Qty: 1700 G | Refills: 1 | Status: SHIPPED | OUTPATIENT
Start: 2023-03-02

## 2023-03-02 NOTE — PROGRESS NOTES
SUBJECTIVE     Chief Complaint   Patient presents with    Annual Exam    Establish Care       HPI  Leydi Matias is a very pleasant 33 y.o. female with medical diagnoses as listed in the medical history and problem list that presents for annual exam. Pt is establishing care with me today.    Pt is UTD on age appropriate CA screening.    Family, social, surgical Hx reviewed     Health Maintenance         Date Due Completion Date    Lipid Panel Never done ---    COVID-19 Vaccine (2 - Moderna series) 10/21/2021 2021    Hemoglobin A1c (Diabetic Prevention Screening) 2023    Cervical Cancer Screening 2023    TETANUS VACCINE 2032            Constipation: occasionally since giving birth 2 months ago. C/s delivery. Adequate diet and hydration.      PAST MEDICAL HISTORY:  Past Medical History:   Diagnosis Date    Acid reflux     Family history of breast cancer in mother     Herpes simplex virus (HSV) infection     Previous  delivery affecting pregnancy 2018       PAST SURGICAL HISTORY:  Past Surgical History:   Procedure Laterality Date    BREAST SURGERY      AUGMENTATION     SECTION N/A 2018    Procedure:  SECTION;  Surgeon: Robert Shields MD;  Location: [facility] L&D;  Service: OB/GYN;  Laterality: N/A;     SECTION  2017, 18    BREECH, female     SECTION WITH TUBAL LIGATION N/A 2022    Procedure:  SECTION, WITH TUBAL LIGATION;  Surgeon: Robert Shields MD;  [facility];  Service: OB/GYN;  Laterality: N/A;    TONSILLECTOMY         SOCIAL HISTORY:  Social History     Socioeconomic History    Marital status:    Tobacco Use    Smoking status: Never    Smokeless tobacco: Never   Substance and Sexual Activity    Alcohol use: No     Alcohol/week: 0.0 standard drinks    Drug use: No    Sexual activity: Not Currently     Partners: Male     Birth control/protection: None     Comment: :   Ha       FAMILY HISTORY:  Family History   Problem Relation Age of Onset    No Known Problems Father     Breast cancer Mother 45    Hyperlipidemia Mother     No Known Problems Daughter     No Known Problems Son     Colon cancer Neg Hx     Ovarian cancer Neg Hx        ALLERGIES AND MEDICATIONS: updated and reviewed.  Review of patient's allergies indicates:  No Known Allergies  Current Outpatient Medications   Medication Sig Dispense Refill    docusate sodium (COLACE) 100 MG capsule Take 1 capsule (100 mg total) by mouth 2 (two) times daily. (Patient not taking: Reported on 1/4/2023) 60 capsule 0    ibuprofen (ADVIL,MOTRIN) 800 MG tablet Take 1 tablet (800 mg total) by mouth every 8 (eight) hours. (Patient not taking: Reported on 3/2/2023) 60 tablet 1    oxyCODONE (ROXICODONE) 5 MG immediate release tablet Take 1 tablet (5 mg total) by mouth every 6 (six) hours as needed (Severe pain). (Patient not taking: Reported on 1/4/2023) 28 tablet 0    PNV no.95/ferrous fum/folic ac (PRENATAL ORAL) Take by mouth once daily at 6am.      polyethylene glycol (GLYCOLAX) 17 gram/dose powder Take 17 g by mouth once daily. 1700 g 1     No current facility-administered medications for this visit.       ROS  Review of Systems   Constitutional:  Negative for fever and weight loss.   Respiratory:  Negative for cough and shortness of breath.    Cardiovascular:  Negative for chest pain and palpitations.   Gastrointestinal:  Negative for abdominal pain, constipation, diarrhea, nausea and vomiting.   Genitourinary:  Negative for dysuria.   Musculoskeletal:  Negative for back pain and joint pain.   Skin:  Negative for rash.   Neurological:  Negative for dizziness, weakness and headaches.   Psychiatric/Behavioral:  Negative for depression. The patient is not nervous/anxious.        OBJECTIVE     Physical Exam  Vitals:    03/02/23 1308   BP: 112/78   Pulse: 94    Body mass index is 35.36 kg/m².  Weight: 102.4 kg (225 lb 12 oz)   Height:  "5' 7" (170.2 cm)     Physical Exam  HENT:      Head: Normocephalic and atraumatic.      Nose: Nose normal.      Mouth/Throat:      Mouth: Mucous membranes are moist.      Pharynx: Oropharynx is clear.   Eyes:      Extraocular Movements: Extraocular movements intact.      Conjunctiva/sclera: Conjunctivae normal.      Pupils: Pupils are equal, round, and reactive to light.   Pulmonary:      Effort: Pulmonary effort is normal.   Musculoskeletal:         General: No swelling. Normal range of motion.      Cervical back: Normal range of motion.      Right lower leg: No edema.      Left lower leg: No edema.   Skin:     General: Skin is warm.      Findings: No lesion or rash.   Neurological:      General: No focal deficit present.      Mental Status: She is alert and oriented to person, place, and time.      Motor: No weakness.   Psychiatric:         Mood and Affect: Mood normal.         Thought Content: Thought content normal.             ASSESSMENT     33 y.o. female with     1. Annual physical exam    2. Obesity (BMI 35.0-39.9 without comorbidity)    3. Constipation, unspecified constipation type        PLAN:     1. Annual physical exam  -     Hemoglobin A1C; Future; Expected date: 03/02/2023  -     T4, Free; Future; Expected date: 03/02/2023  -     TSH; Future; Expected date: 03/02/2023  -     Lipid Panel; Future; Expected date: 03/02/2023  -     Comprehensive Metabolic Panel; Future; Expected date: 03/02/2023  -     CBC Auto Differential; Future; Expected date: 03/02/2023    2. Obesity (BMI 35.0-39.9 without comorbidity)  Overview:  Weight loss counseling provided, including healthy diet rich in vegetables with lean meats and light on grains/red meat/snack foods. Also discussed staying hydrated with water and avoiding soft drinks, as well as exercising >5x/week with at least 30 minutes, including heavy walking. Patient verbalized understanding      Orders:  -     Hemoglobin A1C; Future; Expected date: 03/02/2023  -     " T4, Free; Future; Expected date: 03/02/2023  -     TSH; Future; Expected date: 03/02/2023  -     Lipid Panel; Future; Expected date: 03/02/2023  -     Comprehensive Metabolic Panel; Future; Expected date: 03/02/2023  -     CBC Auto Differential; Future; Expected date: 03/02/2023    3. Constipation, unspecified constipation type  -     polyethylene glycol (GLYCOLAX) 17 gram/dose powder; Take 17 g by mouth once daily.  Dispense: 1700 g; Refill: 1        Discussed age and gender appropriate screenings at this visit and encouraged a healthy diet low in simple carbohydrates, and increased physical activity.  Counseled on medically appropriate vaccines based on age and current health condition.  Screening test reviewed and discussed with patient.      RTC in 1 year     Abigial Cisneros MD  03/04/2023 1:18 PM

## 2023-03-04 ENCOUNTER — LAB VISIT (OUTPATIENT)
Dept: LAB | Facility: HOSPITAL | Age: 33
End: 2023-03-04
Attending: STUDENT IN AN ORGANIZED HEALTH CARE EDUCATION/TRAINING PROGRAM
Payer: COMMERCIAL

## 2023-03-04 ENCOUNTER — PATIENT MESSAGE (OUTPATIENT)
Dept: PRIMARY CARE CLINIC | Facility: CLINIC | Age: 33
End: 2023-03-04
Payer: COMMERCIAL

## 2023-03-04 DIAGNOSIS — E66.9 OBESITY (BMI 35.0-39.9 WITHOUT COMORBIDITY): ICD-10-CM

## 2023-03-04 DIAGNOSIS — Z00.00 ANNUAL PHYSICAL EXAM: ICD-10-CM

## 2023-03-04 LAB
ALBUMIN SERPL BCP-MCNC: 3.7 G/DL (ref 3.5–5.2)
ALP SERPL-CCNC: 66 U/L (ref 55–135)
ALT SERPL W/O P-5'-P-CCNC: 42 U/L (ref 10–44)
ANION GAP SERPL CALC-SCNC: 8 MMOL/L (ref 8–16)
AST SERPL-CCNC: 28 U/L (ref 10–40)
BASOPHILS # BLD AUTO: 0.04 K/UL (ref 0–0.2)
BASOPHILS NFR BLD: 0.7 % (ref 0–1.9)
BILIRUB SERPL-MCNC: 0.3 MG/DL (ref 0.1–1)
BUN SERPL-MCNC: 17 MG/DL (ref 6–20)
CALCIUM SERPL-MCNC: 9.1 MG/DL (ref 8.7–10.5)
CHLORIDE SERPL-SCNC: 105 MMOL/L (ref 95–110)
CHOLEST SERPL-MCNC: 219 MG/DL (ref 120–199)
CHOLEST/HDLC SERPL: 3.2 {RATIO} (ref 2–5)
CO2 SERPL-SCNC: 24 MMOL/L (ref 23–29)
CREAT SERPL-MCNC: 0.8 MG/DL (ref 0.5–1.4)
DIFFERENTIAL METHOD: ABNORMAL
EOSINOPHIL # BLD AUTO: 0.5 K/UL (ref 0–0.5)
EOSINOPHIL NFR BLD: 7.9 % (ref 0–8)
ERYTHROCYTE [DISTWIDTH] IN BLOOD BY AUTOMATED COUNT: 18.8 % (ref 11.5–14.5)
EST. GFR  (NO RACE VARIABLE): >60 ML/MIN/1.73 M^2
ESTIMATED AVG GLUCOSE: 97 MG/DL (ref 68–131)
GLUCOSE SERPL-MCNC: 81 MG/DL (ref 70–110)
HBA1C MFR BLD: 5 % (ref 4–5.6)
HCT VFR BLD AUTO: 38.7 % (ref 37–48.5)
HDLC SERPL-MCNC: 68 MG/DL (ref 40–75)
HDLC SERPL: 31.1 % (ref 20–50)
HGB BLD-MCNC: 11.3 G/DL (ref 12–16)
IMM GRANULOCYTES # BLD AUTO: 0.01 K/UL (ref 0–0.04)
IMM GRANULOCYTES NFR BLD AUTO: 0.2 % (ref 0–0.5)
LDLC SERPL CALC-MCNC: 132.6 MG/DL (ref 63–159)
LYMPHOCYTES # BLD AUTO: 2.1 K/UL (ref 1–4.8)
LYMPHOCYTES NFR BLD: 35.8 % (ref 18–48)
MCH RBC QN AUTO: 23.2 PG (ref 27–31)
MCHC RBC AUTO-ENTMCNC: 29.2 G/DL (ref 32–36)
MCV RBC AUTO: 79 FL (ref 82–98)
MONOCYTES # BLD AUTO: 0.2 K/UL (ref 0.3–1)
MONOCYTES NFR BLD: 3.9 % (ref 4–15)
NEUTROPHILS # BLD AUTO: 3.1 K/UL (ref 1.8–7.7)
NEUTROPHILS NFR BLD: 51.5 % (ref 38–73)
NONHDLC SERPL-MCNC: 151 MG/DL
NRBC BLD-RTO: 0 /100 WBC
PLATELET # BLD AUTO: 189 K/UL (ref 150–450)
PMV BLD AUTO: 10.4 FL (ref 9.2–12.9)
POTASSIUM SERPL-SCNC: 3.9 MMOL/L (ref 3.5–5.1)
PROT SERPL-MCNC: 6.8 G/DL (ref 6–8.4)
RBC # BLD AUTO: 4.88 M/UL (ref 4–5.4)
SODIUM SERPL-SCNC: 137 MMOL/L (ref 136–145)
T4 FREE SERPL-MCNC: 0.89 NG/DL (ref 0.71–1.51)
TRIGL SERPL-MCNC: 92 MG/DL (ref 30–150)
TSH SERPL DL<=0.005 MIU/L-ACNC: 1.59 UIU/ML (ref 0.4–4)
WBC # BLD AUTO: 5.95 K/UL (ref 3.9–12.7)

## 2023-03-04 PROCEDURE — 84443 ASSAY THYROID STIM HORMONE: CPT | Performed by: STUDENT IN AN ORGANIZED HEALTH CARE EDUCATION/TRAINING PROGRAM

## 2023-03-04 PROCEDURE — 80061 LIPID PANEL: CPT | Performed by: STUDENT IN AN ORGANIZED HEALTH CARE EDUCATION/TRAINING PROGRAM

## 2023-03-04 PROCEDURE — 84439 ASSAY OF FREE THYROXINE: CPT | Performed by: STUDENT IN AN ORGANIZED HEALTH CARE EDUCATION/TRAINING PROGRAM

## 2023-03-04 PROCEDURE — 85025 COMPLETE CBC W/AUTO DIFF WBC: CPT | Performed by: STUDENT IN AN ORGANIZED HEALTH CARE EDUCATION/TRAINING PROGRAM

## 2023-03-04 PROCEDURE — 83036 HEMOGLOBIN GLYCOSYLATED A1C: CPT | Performed by: STUDENT IN AN ORGANIZED HEALTH CARE EDUCATION/TRAINING PROGRAM

## 2023-03-04 PROCEDURE — 36415 COLL VENOUS BLD VENIPUNCTURE: CPT | Performed by: STUDENT IN AN ORGANIZED HEALTH CARE EDUCATION/TRAINING PROGRAM

## 2023-03-04 PROCEDURE — 80053 COMPREHEN METABOLIC PANEL: CPT | Performed by: STUDENT IN AN ORGANIZED HEALTH CARE EDUCATION/TRAINING PROGRAM

## 2023-03-06 ENCOUNTER — PATIENT MESSAGE (OUTPATIENT)
Dept: PRIMARY CARE CLINIC | Facility: CLINIC | Age: 33
End: 2023-03-06
Payer: COMMERCIAL

## 2023-03-06 DIAGNOSIS — E66.9 OBESITY (BMI 35.0-39.9 WITHOUT COMORBIDITY): Primary | ICD-10-CM

## 2023-03-06 DIAGNOSIS — E66.9 OBESITY (BMI 35.0-39.9 WITHOUT COMORBIDITY): ICD-10-CM

## 2023-03-06 RX ORDER — SEMAGLUTIDE 1.34 MG/ML
0.25 INJECTION, SOLUTION SUBCUTANEOUS
Qty: 1 PEN | Refills: 0 | Status: SHIPPED | OUTPATIENT
Start: 2023-03-06 | End: 2023-03-14

## 2023-03-06 RX ORDER — SEMAGLUTIDE 1.34 MG/ML
0.25 INJECTION, SOLUTION SUBCUTANEOUS
Qty: 4 PEN | Refills: 0 | Status: SHIPPED | OUTPATIENT
Start: 2023-03-06 | End: 2023-03-06 | Stop reason: SDUPTHER

## 2023-03-06 NOTE — TELEPHONE ENCOUNTER
Also, we will check her routine lab work in about 6 months.  I think her labs should get better just with changes to diet and exercise.

## 2023-03-06 NOTE — TELEPHONE ENCOUNTER
Please let patient know the following:    I have sent the Ozempic to your pharmacy to start.  We will started a dose of 0.25 weekly for 4 weeks.  Message me in 1 month to let me know how you are doing.  We will increase your dose to 0.5 weekly at that time.  You will continue on that dose for 2 months.     In 3 months, you will then follow-up with me in clinic.  At that time, we will take her vitals and check your weight.  We will look for you to have lost 5% of your total body weight in the 1st 3 months of being on the medication.

## 2023-03-10 ENCOUNTER — PATIENT MESSAGE (OUTPATIENT)
Dept: PRIMARY CARE CLINIC | Facility: CLINIC | Age: 33
End: 2023-03-10
Payer: COMMERCIAL

## 2023-03-10 NOTE — TELEPHONE ENCOUNTER
Please let her know we can have her see the Bariatric Medicine (weight loss) Department if she is interested.  They do medications, have nutritionists, and other resources and might be able to help get something that is approved through her insurance.  Please let me know if she is interested and I will place a referral.

## 2023-03-14 ENCOUNTER — POSTPARTUM VISIT (OUTPATIENT)
Dept: OBSTETRICS AND GYNECOLOGY | Facility: CLINIC | Age: 33
End: 2023-03-14
Attending: OBSTETRICS & GYNECOLOGY
Payer: COMMERCIAL

## 2023-03-14 ENCOUNTER — LAB VISIT (OUTPATIENT)
Dept: LAB | Facility: HOSPITAL | Age: 33
End: 2023-03-14
Attending: OBSTETRICS & GYNECOLOGY
Payer: COMMERCIAL

## 2023-03-14 VITALS — WEIGHT: 222.69 LBS | HEIGHT: 67 IN | BODY MASS INDEX: 34.95 KG/M2

## 2023-03-14 DIAGNOSIS — Z11.51 SCREENING FOR HPV (HUMAN PAPILLOMAVIRUS): ICD-10-CM

## 2023-03-14 DIAGNOSIS — R23.2 HOT FLASHES: ICD-10-CM

## 2023-03-14 DIAGNOSIS — Z12.4 SCREENING FOR CERVICAL CANCER: ICD-10-CM

## 2023-03-14 LAB
T4 FREE SERPL-MCNC: 0.9 NG/DL (ref 0.71–1.51)
TSH SERPL DL<=0.005 MIU/L-ACNC: 1.51 UIU/ML (ref 0.4–4)

## 2023-03-14 PROCEDURE — 84443 ASSAY THYROID STIM HORMONE: CPT | Performed by: OBSTETRICS & GYNECOLOGY

## 2023-03-14 PROCEDURE — 99999 PR PBB SHADOW E&M-EST. PATIENT-LVL III: CPT | Mod: PBBFAC,,, | Performed by: OBSTETRICS & GYNECOLOGY

## 2023-03-14 PROCEDURE — 0503F PR POSTPARTUM CARE VISIT: ICD-10-PCS | Mod: CPTII,S$GLB,, | Performed by: OBSTETRICS & GYNECOLOGY

## 2023-03-14 PROCEDURE — 0503F POSTPARTUM CARE VISIT: CPT | Mod: CPTII,S$GLB,, | Performed by: OBSTETRICS & GYNECOLOGY

## 2023-03-14 PROCEDURE — 87624 HPV HI-RISK TYP POOLED RSLT: CPT | Performed by: OBSTETRICS & GYNECOLOGY

## 2023-03-14 PROCEDURE — 84439 ASSAY OF FREE THYROXINE: CPT | Performed by: OBSTETRICS & GYNECOLOGY

## 2023-03-14 PROCEDURE — 99999 PR PBB SHADOW E&M-EST. PATIENT-LVL III: ICD-10-PCS | Mod: PBBFAC,,, | Performed by: OBSTETRICS & GYNECOLOGY

## 2023-03-14 PROCEDURE — 88175 CYTOPATH C/V AUTO FLUID REDO: CPT | Performed by: OBSTETRICS & GYNECOLOGY

## 2023-03-14 NOTE — PROGRESS NOTES
Subjective:      Leydi Matias is a 33 y.o.  who presents for a postpartum visit.    She is status post   delivery secondary to repeat 3 months ago.   Having hot flashes and night sweats   Just having first cycle now   Having trouble losing baby weight     Review the Delivery Report for details.     Her hospitalization was not complicated.    She is not breastfeeding.    She desires bilateral tubal ligation for contraception.   She denies abdominal pain signs and symptoms of postpartum depression.    Her last pap was  normal and Hpv neg     Past Medical History:   Diagnosis Date    Acid reflux     Family history of breast cancer in mother     Herpes simplex virus (HSV) infection     Previous  delivery affecting pregnancy 2018       Current Outpatient Medications:     PNV no.95/ferrous fum/folic ac (PRENATAL ORAL), Take by mouth once daily at 6am., Disp: , Rfl:     polyethylene glycol (GLYCOLAX) 17 gram/dose powder, Take 17 g by mouth once daily., Disp: 1700 g, Rfl: 1      Objective:     General: healthy, alert, no distress  Abdomen: Abdomen soft, nontender. no masses,  no hernias  External Genitalia: normal, well-healed, without lesions or masses  Vagina: normal, well-healed, physiologic discharge, without lesions  Cervix: normal, well-healed, without lesions  Uterus: normal size, well involuted, firm, non-tender  Adnexa: no masses palpable and nontender    Assessment:     Postpartum care and examination    Hot flashes  -     TSH; Future  -     T4, FREE; Future; Expected date: 2023    Pap done today   BC tubal     Plan:     1. Return to clinic in for annual exam in 6 months for annual  2/ Labs today

## 2023-03-16 LAB
HPV HR 12 DNA SPEC QL NAA+PROBE: NEGATIVE
HPV16 AG SPEC QL: NEGATIVE
HPV18 DNA SPEC QL NAA+PROBE: NEGATIVE

## 2023-03-20 LAB
FINAL PATHOLOGIC DIAGNOSIS: NORMAL
Lab: NORMAL

## 2023-07-03 ENCOUNTER — OFFICE VISIT (OUTPATIENT)
Dept: OBSTETRICS AND GYNECOLOGY | Facility: CLINIC | Age: 33
End: 2023-07-03
Payer: COMMERCIAL

## 2023-07-03 ENCOUNTER — TELEPHONE (OUTPATIENT)
Dept: OBSTETRICS AND GYNECOLOGY | Facility: CLINIC | Age: 33
End: 2023-07-03
Payer: COMMERCIAL

## 2023-07-03 VITALS
BODY MASS INDEX: 33.06 KG/M2 | DIASTOLIC BLOOD PRESSURE: 70 MMHG | WEIGHT: 210.63 LBS | HEIGHT: 67 IN | SYSTOLIC BLOOD PRESSURE: 100 MMHG

## 2023-07-03 DIAGNOSIS — N89.8 VAGINAL DISCHARGE: Primary | ICD-10-CM

## 2023-07-03 DIAGNOSIS — N76.0 ACUTE VAGINITIS: ICD-10-CM

## 2023-07-03 PROCEDURE — 99999 PR PBB SHADOW E&M-EST. PATIENT-LVL III: CPT | Mod: PBBFAC,,,

## 2023-07-03 PROCEDURE — 99213 OFFICE O/P EST LOW 20 MIN: CPT | Mod: S$GLB,,,

## 2023-07-03 PROCEDURE — 1159F MED LIST DOCD IN RCRD: CPT | Mod: CPTII,S$GLB,,

## 2023-07-03 PROCEDURE — 99213 PR OFFICE/OUTPT VISIT, EST, LEVL III, 20-29 MIN: ICD-10-PCS | Mod: S$GLB,,,

## 2023-07-03 PROCEDURE — 3008F BODY MASS INDEX DOCD: CPT | Mod: CPTII,S$GLB,,

## 2023-07-03 PROCEDURE — 3044F PR MOST RECENT HEMOGLOBIN A1C LEVEL <7.0%: ICD-10-PCS | Mod: CPTII,S$GLB,,

## 2023-07-03 PROCEDURE — 3074F PR MOST RECENT SYSTOLIC BLOOD PRESSURE < 130 MM HG: ICD-10-PCS | Mod: CPTII,S$GLB,,

## 2023-07-03 PROCEDURE — 3044F HG A1C LEVEL LT 7.0%: CPT | Mod: CPTII,S$GLB,,

## 2023-07-03 PROCEDURE — 1159F PR MEDICATION LIST DOCUMENTED IN MEDICAL RECORD: ICD-10-PCS | Mod: CPTII,S$GLB,,

## 2023-07-03 PROCEDURE — 3078F DIAST BP <80 MM HG: CPT | Mod: CPTII,S$GLB,,

## 2023-07-03 PROCEDURE — 3008F PR BODY MASS INDEX (BMI) DOCUMENTED: ICD-10-PCS | Mod: CPTII,S$GLB,,

## 2023-07-03 PROCEDURE — 3074F SYST BP LT 130 MM HG: CPT | Mod: CPTII,S$GLB,,

## 2023-07-03 PROCEDURE — 3078F PR MOST RECENT DIASTOLIC BLOOD PRESSURE < 80 MM HG: ICD-10-PCS | Mod: CPTII,S$GLB,,

## 2023-07-03 PROCEDURE — 99999 PR PBB SHADOW E&M-EST. PATIENT-LVL III: ICD-10-PCS | Mod: PBBFAC,,,

## 2023-07-03 PROCEDURE — 81514 NFCT DS BV&VAGINITIS DNA ALG: CPT

## 2023-07-03 RX ORDER — METRONIDAZOLE 500 MG/1
500 TABLET ORAL 2 TIMES DAILY
Qty: 14 TABLET | Refills: 0 | Status: SHIPPED | OUTPATIENT
Start: 2023-07-03 | End: 2023-07-10

## 2023-07-03 RX ORDER — FLUCONAZOLE 150 MG/1
150 TABLET ORAL ONCE
Qty: 2 TABLET | Refills: 0 | Status: SHIPPED | OUTPATIENT
Start: 2023-07-03 | End: 2023-07-03

## 2023-07-03 NOTE — PROGRESS NOTES
"  Chief Complaint: Vaginal Pruritis     HPI:      Leydi Matias is a 33 y.o.  who presents with complaint of vaginal pruritis for 3 weeks.  She reports itching, and weird odor.  She states the discharge is thin white.  Denies fever, chills, n/v, pelvic pain, or ill feeling. Denies urinary urgency, frequency, or dysuria. She is currently sexually active with a single male partner.  Reports some discomfort and spotting after sex.  She is using no method for contraception. She has not experienced symptoms like this before. Patient does not douche. Patient's last menstrual period was 2023 (exact date).    ROS:   GENERAL: Denies weight gain or weight loss. Feeling well overall.   SKIN: Denies rash or lesions.   ABDOMEN: Denies abdominal pain, constipation, diarrhea, nausea, vomiting, change in appetite or rectal bleeding.   URINARY: Denies frequency, dysuria, hematuria.  GYN: See HPI.    Physical Exam:      PHYSICAL EXAM:   Vitals:    23 1604   BP: 100/70   Weight: 95.5 kg (210 lb 10.4 oz)   Height: 5' 7" (1.702 m)   PainSc: 0-No pain     Body mass index is 32.99 kg/m².    General: No acute distress, alert and engaged  VULVA: normal appearing vulva with no masses, tenderness or lesions   VAGINA: normal appearing vagina with normal color. + thin clumpy white discharge, no lesions   CERVIX: normal appearing cervix without discharge or lesions   UTERUS: uterus is normal size, shape, consistency and nontender   ADNEXA: normal adnexa in size, nontender and no masses    Assessment/Plan:     Vaginal discharge  -     Vaginosis Screen by DNA Probe    Acute vaginitis  -     metroNIDAZOLE (FLAGYL) 500 MG tablet; Take 1 tablet (500 mg total) by mouth 2 (two) times daily. Do not drink alcohol while taking this medication. for 7 days  Dispense: 14 tablet; Refill: 0  -     fluconazole (DIFLUCAN) 150 MG Tab; Take 1 tablet (150 mg total) by mouth once. Take 1 tablet (150 mg total) by mouth once daily.  If symptoms " persist, take second tablet 3 days after first tablet. for 1 dose  Dispense: 2 tablet; Refill: 0      Patient was counseled today on vaginitis prevention including :  a. avoiding feminine products such as deoderant soaps, body wash, bubble bath, douches, scented toilet paper, deoderant tampons or pads, feminine wipes, chronic pad use, etc.  b. avoiding other vulvovaginal irritants such as long hot baths, humidity, tight, synthetic clothing, chlorine and sitting around in wet bathing suits  c. wearing cotton underwear, avoiding thong underwear and no underwear to bed  d. taking showers instead of baths and use a hair dryer on cool setting afterwards to dry  e. wearing cotton to exercise and shower immediately after exercise and change clothes  f. using polyurethane condoms without spermicide if sexually active and symptoms are triggered by intercourse    Use of MyChart and Patient Portal recommended to patient today.    FOLLOW UP: PRN lack of improvement.    Mary Barrera (Maggie), GUERITA  Obstetrics and Gynecology  [facility]

## 2023-07-04 LAB
BACTERIAL VAGINOSIS DNA: NEGATIVE
CANDIDA GLABRATA DNA: POSITIVE
CANDIDA KRUSEI DNA: NEGATIVE
CANDIDA RRNA VAG QL PROBE: NEGATIVE
T VAGINALIS RRNA GENITAL QL PROBE: NEGATIVE

## 2023-10-23 ENCOUNTER — TELEPHONE (OUTPATIENT)
Dept: OBSTETRICS AND GYNECOLOGY | Facility: CLINIC | Age: 33
End: 2023-10-23

## 2023-10-23 NOTE — TELEPHONE ENCOUNTER
Pt going out of the country on 11[place]2 and will be starting period during her trip or right before she leave. Would like something to delay period.      Discussed OCPs, advised may lighten period but won't delay it. She could like to try it.  Aware she should start OCP on the first day of period.

## 2023-10-23 NOTE — TELEPHONE ENCOUNTER
Spoke to pt  Nothing will really delay it as period due next week!  Will not send in OCP at this point as really too late   Pt understands and good with just having a cycle

## 2024-01-18 ENCOUNTER — TELEPHONE (OUTPATIENT)
Dept: OBSTETRICS AND GYNECOLOGY | Facility: CLINIC | Age: 34
End: 2024-01-18
Payer: COMMERCIAL

## 2024-01-18 NOTE — TELEPHONE ENCOUNTER
Sounds like her sx may be from Ozempic but really does need to see PCP for eval   Also talk to the docs at [facility]

## 2024-01-18 NOTE — TELEPHONE ENCOUNTER
Briseyda Gorman Staff8 minutes ago (9:52 AM)     CM  Bone pt called in to discuss getting blood work ordered. Pt states she had not been feeling well and just want to make sure everything is okay     Leydi Matias [phone]  Briseyda Gorman9 minutes ago (9:51 AM)

## 2024-01-18 NOTE — TELEPHONE ENCOUNTER
1/18/24 @ 8432 Returned pt's call, Pt states she started ozempic several months ago. She states she has had a stomach virus maybe 6 times since August. [facility] Does not go inside with injection. Bouts with Diarrhea and nausea and vomiting. Recommended primary care, pt states she does not have one. Referenced Dr Cisneros she said she does not see here and would like to know if Dr Shields could order some lab work for her. Instructed pt Dr Shields may want her to see her primary care, but that I will ask, Pt verbalizes understanding.     1/18/24 @ 7412 I spoke pt let her know Dr Shields recommends she speak with the Drs at [facility] and establish care with a primary care. Pt verbalizes understanding.

## 2024-11-25 ENCOUNTER — TELEPHONE (OUTPATIENT)
Dept: OBSTETRICS AND GYNECOLOGY | Facility: CLINIC | Age: 34
End: 2024-11-25
Payer: COMMERCIAL

## 2024-11-25 RX ORDER — VALACYCLOVIR HYDROCHLORIDE 500 MG/1
500 TABLET, FILM COATED ORAL 2 TIMES DAILY PRN
Qty: 30 TABLET | Refills: 0 | Status: SHIPPED | OUTPATIENT
Start: 2024-11-25 | End: 2024-12-25

## 2024-11-25 NOTE — TELEPHONE ENCOUNTER
"Pt thinks she is having an outbreak.  C/o vaginal irritation and saw a "dot" when she looked with a mirror.  Hasn't had one in years.  Has been very stressed lately.  She is currently [place] of [place].  Requesting rx.     Valtrex pended    Annual scheduled  "

## 2025-02-10 NOTE — PROGRESS NOTES
Subjective:       Patient ID: Leydi Matias is a 35 y.o. female.    Chief Complaint: Obesity      The patient location is: [place], [place]  The chief complaint leading to consultation is: Obesity    Visit type: audiovisual    Face to Face time with patient: 15 minutes  20 minutes of total time spent on the encounter, which includes face to face time and non-face to face time preparing to see the patient (eg, review of tests), Obtaining and/or reviewing separately obtained history, Documenting clinical information in the electronic or other health record, Independently interpreting results (not separately reported) and communicating results to the patient/family/caregiver, or Care coordination (not separately reported).         Each patient to whom he or she provides medical services by telemedicine is:  (1) informed of the relationship between the physician and patient and the respective role of any other health care provider with respect to management of the patient; and (2) notified that he or she may decline to receive medical services by telemedicine and may withdraw from such care at any time.    Notes:     HPI    Obesity:   BMI 32.99 in clinic   Diet high in refined carbohydrate  No current calorie counting  No regular exercise      Review of Systems   Constitutional:  Negative for activity change, fatigue, fever and unexpected weight change.   HENT:  Negative for hearing loss, rhinorrhea and trouble swallowing.    Eyes:  Negative for discharge and visual disturbance.   Respiratory:  Negative for cough, chest tightness, shortness of breath and wheezing.    Cardiovascular:  Negative for chest pain and palpitations.   Gastrointestinal:  Negative for abdominal pain, blood in stool, constipation, diarrhea, nausea and vomiting.   Endocrine: Negative for polydipsia and polyuria.   Genitourinary:  Negative for difficulty urinating, dysuria, hematuria and menstrual problem.   Musculoskeletal:  Negative for  arthralgias, back pain, joint swelling and neck pain.   Neurological:  Negative for weakness and headaches.   Psychiatric/Behavioral:  Negative for confusion and dysphoric mood.          Objective:      Physical Exam  HENT:      Head: Normocephalic and atraumatic.      Nose: Nose normal.      Mouth/Throat:      Mouth: Mucous membranes are moist.      Pharynx: Oropharynx is clear.   Eyes:      Extraocular Movements: Extraocular movements intact.      Conjunctiva/sclera: Conjunctivae normal.      Pupils: Pupils are equal, round, and reactive to light.   Cardiovascular:      Rate and Rhythm: Normal rate.   Pulmonary:      Effort: Pulmonary effort is normal.   Musculoskeletal:      Cervical back: Normal range of motion.   Skin:     Findings: No lesion or rash.   Neurological:      General: No focal deficit present.      Mental Status: She is alert and oriented to person, place, and time.      Motor: No weakness.   Psychiatric:         Mood and Affect: Mood normal.         Thought Content: Thought content normal.         Assessment:       Problem List Items Addressed This Visit    None  Visit Diagnoses         Morbid obesity    -  Primary    Relevant Medications    tirzepatide, weight loss, (ZEPBOUND) 2.5 mg/0.5 mL PnIj            Plan:       Diagnoses and all orders for this visit:    Morbid obesity  -     tirzepatide, weight loss, (ZEPBOUND) 2.5 mg/0.5 mL PnIj; Inject 2.5 mg into the skin every 7 days.         Weight loss counseling provided, including healthy diet rich in vegetables with lean meats and light on grains/red meat/snack foods. Also discussed staying hydrated with water and avoiding soft drinks, as well as exercising >5x/week with at least 30 minutes, including heavy walking. Patient verbalized understanding

## 2025-02-11 ENCOUNTER — OFFICE VISIT (OUTPATIENT)
Dept: PRIMARY CARE CLINIC | Facility: CLINIC | Age: 35
End: 2025-02-11
Payer: COMMERCIAL

## 2025-02-11 DIAGNOSIS — E66.01 MORBID OBESITY: Primary | ICD-10-CM

## 2025-02-11 RX ORDER — TIRZEPATIDE 2.5 MG/.5ML
2.5 INJECTION, SOLUTION SUBCUTANEOUS
Qty: 2 ML | Refills: 2 | Status: SHIPPED | OUTPATIENT
Start: 2025-02-11 | End: 2025-05-12

## 2025-04-29 DIAGNOSIS — E66.01 MORBID OBESITY: ICD-10-CM

## 2025-04-29 RX ORDER — TIRZEPATIDE 2.5 MG/.5ML
2.5 INJECTION, SOLUTION SUBCUTANEOUS
Qty: 2 ML | Refills: 2 | Status: SHIPPED | OUTPATIENT
Start: 2025-04-29 | End: 2025-07-28

## 2025-04-29 NOTE — TELEPHONE ENCOUNTER
Care Due:                  Date            Visit Type   Department     Provider  --------------------------------------------------------------------------------                                ESTABLISHED                              PATIENT -    [facility]  Last Visit: 02-      [facility]           Abigail Cisneros  Next Visit: None Scheduled  None         None Found                                                            Last  Test          Frequency    Reason                     Performed    Due Date  --------------------------------------------------------------------------------    HBA1C.......  6 months...  tirzepatide,.............  03- 08-    [facility] Embedded Care Due Messages. Reference number: [id number].   4/29/2025 4:37:45 PM CDT

## 2025-04-29 NOTE — TELEPHONE ENCOUNTER
----- Message from Sheryl sent at 4/29/2025  4:33 PM CDT -----  Contact: [phone] Patient is calling for Medical Advice regarding: How long has patient had these symptoms:Pharmacy name and phone#:[id number] - [place] - [id number] [facility]:Comments:Pt would like to request a refill on her med tirzepatide, weight loss, (ZEPBOUND) 2.5 mg/0.5 mL PnIj and would like to go up on the dosage. Please call pt to advise Please advise patient replies from provider may take up to 48 hours.

## 2025-07-18 DIAGNOSIS — E66.01 MORBID OBESITY: ICD-10-CM

## 2025-07-18 RX ORDER — TIRZEPATIDE 2.5 MG/.5ML
2.5 INJECTION, SOLUTION SUBCUTANEOUS
Qty: 2 ML | Refills: 2 | Status: SHIPPED | OUTPATIENT
Start: 2025-07-18 | End: 2025-10-16

## 2025-07-18 NOTE — TELEPHONE ENCOUNTER
Care Due:                  Date            Visit Type   Department     Provider  --------------------------------------------------------------------------------                                ESTABLISHED                              PATIENT -    [facility]  Last Visit: 02-      [facility]           Abigail Cisneros  Next Visit: None Scheduled  None         None Found                                                            Last  Test          Frequency    Reason                     Performed    Due Date  --------------------------------------------------------------------------------    HBA1C.......  6 months...  tirzepatide,.............  03- 08-    [facility] Embedded Care Due Messages. Reference number: [id number].   7/18/2025 7:11:15 AM CDT